# Patient Record
Sex: FEMALE | Race: WHITE | NOT HISPANIC OR LATINO
[De-identification: names, ages, dates, MRNs, and addresses within clinical notes are randomized per-mention and may not be internally consistent; named-entity substitution may affect disease eponyms.]

---

## 2019-10-02 ENCOUNTER — APPOINTMENT (OUTPATIENT)
Dept: CARDIOLOGY | Facility: CLINIC | Age: 69
End: 2019-10-02
Payer: MEDICARE

## 2019-10-02 VITALS
HEIGHT: 67 IN | HEART RATE: 86 BPM | SYSTOLIC BLOOD PRESSURE: 122 MMHG | BODY MASS INDEX: 26.06 KG/M2 | WEIGHT: 166 LBS | DIASTOLIC BLOOD PRESSURE: 70 MMHG

## 2019-10-02 DIAGNOSIS — Z87.891 PERSONAL HISTORY OF NICOTINE DEPENDENCE: ICD-10-CM

## 2019-10-02 PROCEDURE — 99214 OFFICE O/P EST MOD 30 MIN: CPT

## 2019-10-02 PROCEDURE — 93000 ELECTROCARDIOGRAM COMPLETE: CPT

## 2019-10-02 RX ORDER — LANSOPRAZOLE 30 MG/1
30 CAPSULE, DELAYED RELEASE ORAL DAILY
Refills: 0 | Status: ACTIVE | COMMUNITY

## 2019-10-02 RX ORDER — ROSUVASTATIN CALCIUM 5 MG/1
5 TABLET, FILM COATED ORAL DAILY
Refills: 0 | Status: ACTIVE | COMMUNITY

## 2019-10-02 NOTE — PHYSICAL EXAM
[General Appearance - Well Developed] : well developed [Normal Appearance] : normal appearance [Well Groomed] : well groomed [No Deformities] : no deformities [General Appearance - Well Nourished] : well nourished [General Appearance - In No Acute Distress] : no acute distress [Heart Rate And Rhythm] : heart rate and rhythm were normal [Heart Sounds] : normal S1 and S2 [Murmurs] : no murmurs present [Respiration, Rhythm And Depth] : normal respiratory rhythm and effort [Auscultation Breath Sounds / Voice Sounds] : lungs were clear to auscultation bilaterally [Exaggerated Use Of Accessory Muscles For Inspiration] : no accessory muscle use [Abdomen Soft] : soft [Abdomen Tenderness] : non-tender [] : no hepato-splenomegaly [Abdomen Mass (___ Cm)] : no abdominal mass palpated [Oriented To Time, Place, And Person] : oriented to person, place, and time [Affect] : the affect was normal [Mood] : the mood was normal [No Anxiety] : not feeling anxious [FreeTextEntry1] : no pedal edema

## 2019-10-02 NOTE — HISTORY OF PRESENT ILLNESS
[FreeTextEntry1] : Today the patient reports she has been doing well without any new symptoms and she denies chest pain, SOB, dizziness, palpitations, or syncopes\par She is compliant with all her current medication.\par referred her for cardiac evaluatiojn\par ekg shows lahb and rbbb\par denies valvular heart disease\par no -palps, pre-syncope or syncope\par no tia, cva or pvd

## 2019-10-04 ENCOUNTER — OTHER (OUTPATIENT)
Age: 69
End: 2019-10-04

## 2019-11-25 ENCOUNTER — APPOINTMENT (OUTPATIENT)
Dept: CARDIOLOGY | Facility: CLINIC | Age: 69
End: 2019-11-25
Payer: MEDICARE

## 2019-11-25 PROCEDURE — 93306 TTE W/DOPPLER COMPLETE: CPT

## 2019-11-25 PROCEDURE — 93880 EXTRACRANIAL BILAT STUDY: CPT

## 2020-06-18 ENCOUNTER — APPOINTMENT (OUTPATIENT)
Dept: CARDIOLOGY | Facility: CLINIC | Age: 70
End: 2020-06-18

## 2020-08-26 ENCOUNTER — APPOINTMENT (OUTPATIENT)
Dept: SURGERY | Facility: CLINIC | Age: 70
End: 2020-08-26
Payer: MEDICARE

## 2020-08-26 VITALS
DIASTOLIC BLOOD PRESSURE: 81 MMHG | BODY MASS INDEX: 27.78 KG/M2 | TEMPERATURE: 97.2 F | SYSTOLIC BLOOD PRESSURE: 115 MMHG | HEIGHT: 67 IN | WEIGHT: 177 LBS | HEART RATE: 82 BPM

## 2020-08-26 DIAGNOSIS — Z80.0 FAMILY HISTORY OF MALIGNANT NEOPLASM OF DIGESTIVE ORGANS: ICD-10-CM

## 2020-08-26 DIAGNOSIS — Z84.1 FAMILY HISTORY OF DISORDERS OF KIDNEY AND URETER: ICD-10-CM

## 2020-08-26 DIAGNOSIS — C44.722 SQUAMOUS CELL CARCINOMA OF SKIN OF RIGHT LOWER LIMB, INCLUDING HIP: ICD-10-CM

## 2020-08-26 DIAGNOSIS — C44.42 SQUAMOUS CELL CARCINOMA OF SKIN OF SCALP AND NECK: ICD-10-CM

## 2020-08-26 DIAGNOSIS — D21.12 BENIGN NEOPLASM OF CONNECTIVE AND OTHER SOFT TISSUE OF LEFT UPPER LIMB, INCLUDING SHOULDER: ICD-10-CM

## 2020-08-26 DIAGNOSIS — Z82.49 FAMILY HISTORY OF ISCHEMIC HEART DISEASE AND OTHER DISEASES OF THE CIRCULATORY SYSTEM: ICD-10-CM

## 2020-08-26 DIAGNOSIS — Z80.3 FAMILY HISTORY OF MALIGNANT NEOPLASM OF BREAST: ICD-10-CM

## 2020-08-26 PROCEDURE — 99203 OFFICE O/P NEW LOW 30 MIN: CPT

## 2020-08-26 NOTE — ASSESSMENT
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 After obtaining the slides for review and confirmation of the diagnosis, she will have a wide local excision, I believe this can be accomplished with primary closure under local anesthesia with IV sedation. The procedure was discussed in full with its risks and benefits and they understand and agree.\par The patient has been referred already to Dr. France for management of the right frontal scalp lesion, which I believe is appropriate as it may be better managed with a Mohs excision. 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 She understands to contact me should it enlarge or becomes symptomatic.\par \par The patient will return to Dr. Barron for cardiology reevaluation prior to surgery. All their questions were answered and a happy with the assessment and plan

## 2020-08-26 NOTE — REASON FOR VISIT
[Initial Evaluation] : an initial evaluation [Spouse] : spouse [FreeTextEntry1] : skin lesion right lower leg

## 2020-08-26 NOTE — HISTORY OF PRESENT ILLNESS
[de-identified] : The patient is referred by her dermatologist and comes with her  for treatment of a skin lesion of the right lower leg she has known about this since June and when it was biopsied by the dermatologist, this revealed a squamous cell carcinoma. 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 She has been referred to Dr. Kiki France to deal with this lesion. They are asymptomatic and not causing any bleeding or other problems

## 2020-08-26 NOTE — DATA REVIEWED
[FreeTextEntry1] : Dermatopathology report from earlier this month noted. Also reviewed some of her prior cardiology evaluations.

## 2020-08-26 NOTE — PHYSICAL EXAM
[Normal Thyroid] : the thyroid was normal [Normal Breath Sounds] : Normal breath sounds [Normal Heart Sounds] : normal heart sounds [Normal Rate and Rhythm] : normal rate and rhythm [No HSM] : no hepatosplenomegaly [de-identified] : healthy in appearance [Abdominal Masses] : No abdominal masses [de-identified] : No inguinal or femoral lymphadenopathy bilaterally. [de-identified] : 3 small open lesions are noted in the right frontal region, adjacent to a scar from prior SCC. excision. The lesions comprising an area of 1.5 cm with no evidence of satellite disease or palpable mass. No lymphadenopathy noted in the parotid, cervical or supraclavicular regions. [de-identified] : A 3 x 3.5 cm lobulated subcutaneous mass in the left antecubital fossa, this is stable and asymptomatic over the long-term as per the patient. [de-identified] : 5 by 8mm healing shaved biopsy site in the distal part of the right pretibial region. This area is soft and mobile and there is no evidence of satellite disease or disease in transit. She has normal pedal pulses bilaterally.

## 2020-08-27 ENCOUNTER — APPOINTMENT (OUTPATIENT)
Dept: CARDIOLOGY | Facility: CLINIC | Age: 70
End: 2020-08-27
Payer: MEDICARE

## 2020-08-27 VITALS
WEIGHT: 175 LBS | BODY MASS INDEX: 27.41 KG/M2 | DIASTOLIC BLOOD PRESSURE: 80 MMHG | SYSTOLIC BLOOD PRESSURE: 132 MMHG | HEART RATE: 93 BPM | TEMPERATURE: 97.1 F

## 2020-08-27 PROCEDURE — 93000 ELECTROCARDIOGRAM COMPLETE: CPT

## 2020-08-27 PROCEDURE — 99214 OFFICE O/P EST MOD 30 MIN: CPT

## 2020-08-27 NOTE — ASSESSMENT
[FreeTextEntry1] : Today the patient reports she has been doing well without any new symptoms and she denies chest pain, SOB, dizziness, palpitations, or syncopes. She is compliant with all her current medication.\par \par No further testing at this time, no restrictions on her activity and she will continue her present cardiac medications. She will return in four months for follow-up.

## 2020-08-27 NOTE — REASON FOR VISIT
[Coronary Artery Disease] : coronary artery disease [Initial Evaluation] : an initial evaluation [Spouse] : spouse [FreeTextEntry1] : skin lesion right lower leg

## 2020-08-27 NOTE — HISTORY OF PRESENT ILLNESS
[FreeTextEntry1] : Today the patient reports she has been doing well without any new symptoms and she denies chest pain, SOB, dizziness, palpitations, or syncopes. She is compliant with all her current medication. [de-identified] : The patient is referred by her dermatologist and comes with her  for treatment of a skin lesion of the right lower leg she has known about this since June and when it was biopsied by the dermatologist, this revealed a squamous cell carcinoma. 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 She has been referred to Dr. Kiki France to deal with this lesion. They are asymptomatic and not causing any bleeding or other problems

## 2020-08-27 NOTE — PHYSICAL EXAM
[General Appearance - Well Developed] : well developed [Normal Appearance] : normal appearance [Well Groomed] : well groomed [General Appearance - Well Nourished] : well nourished [No Deformities] : no deformities [General Appearance - In No Acute Distress] : no acute distress [Respiration, Rhythm And Depth] : normal respiratory rhythm and effort [Exaggerated Use Of Accessory Muscles For Inspiration] : no accessory muscle use [Auscultation Breath Sounds / Voice Sounds] : lungs were clear to auscultation bilaterally [Heart Rate And Rhythm] : heart rate and rhythm were normal [Heart Sounds] : normal S1 and S2 [Murmurs] : no murmurs present [Abdomen Soft] : soft [Abdomen Tenderness] : non-tender [] : no hepato-splenomegaly [Abdomen Mass (___ Cm)] : no abdominal mass palpated [Oriented To Time, Place, And Person] : oriented to person, place, and time [Affect] : the affect was normal [Mood] : the mood was normal [No Anxiety] : not feeling anxious [Normal Thyroid] : the thyroid was normal [Normal Breath Sounds] : Normal breath sounds [Normal Heart Sounds] : normal heart sounds [Normal Rate and Rhythm] : normal rate and rhythm [No HSM] : no hepatosplenomegaly [FreeTextEntry1] : no bruit [Abdominal Masses] : No abdominal masses [de-identified] : healthy in appearance [de-identified] : 3 small open lesions are noted in the right frontal region, adjacent to a scar from prior SCC. excision. The lesions comprising an area of 1.5 cm with no evidence of satellite disease or palpable mass. No lymphadenopathy noted in the parotid, cervical or supraclavicular regions. [de-identified] : No inguinal or femoral lymphadenopathy bilaterally. [de-identified] : A 3 x 3.5 cm lobulated subcutaneous mass in the left antecubital fossa, this is stable and asymptomatic over the long-term as per the patient. [de-identified] : 5 by 8mm healing shaved biopsy site in the distal part of the right pretibial region. This area is soft and mobile and there is no evidence of satellite disease or disease in transit. She has normal pedal pulses bilaterally.

## 2020-08-31 ENCOUNTER — LABORATORY RESULT (OUTPATIENT)
Age: 70
End: 2020-08-31

## 2020-10-08 ENCOUNTER — APPOINTMENT (OUTPATIENT)
Dept: SURGERY | Facility: CLINIC | Age: 70
End: 2020-10-08

## 2021-01-28 ENCOUNTER — APPOINTMENT (OUTPATIENT)
Dept: CARDIOLOGY | Facility: CLINIC | Age: 71
End: 2021-01-28
Payer: MEDICARE

## 2021-01-28 VITALS
WEIGHT: 186 LBS | HEART RATE: 67 BPM | TEMPERATURE: 97.3 F | BODY MASS INDEX: 29.13 KG/M2 | SYSTOLIC BLOOD PRESSURE: 110 MMHG | DIASTOLIC BLOOD PRESSURE: 60 MMHG

## 2021-01-28 PROCEDURE — 93000 ELECTROCARDIOGRAM COMPLETE: CPT

## 2021-01-28 PROCEDURE — 99214 OFFICE O/P EST MOD 30 MIN: CPT

## 2021-01-28 NOTE — PHYSICAL EXAM
[General Appearance - Well Developed] : well developed [Normal Appearance] : normal appearance [Well Groomed] : well groomed [General Appearance - Well Nourished] : well nourished [No Deformities] : no deformities [General Appearance - In No Acute Distress] : no acute distress [Respiration, Rhythm And Depth] : normal respiratory rhythm and effort [Auscultation Breath Sounds / Voice Sounds] : lungs were clear to auscultation bilaterally [Exaggerated Use Of Accessory Muscles For Inspiration] : no accessory muscle use [Heart Rate And Rhythm] : heart rate and rhythm were normal [Heart Sounds] : normal S1 and S2 [Murmurs] : no murmurs present [Abdomen Soft] : soft [Abdomen Tenderness] : non-tender [] : no hepato-splenomegaly [Abdomen Mass (___ Cm)] : no abdominal mass palpated [FreeTextEntry1] : no pedal edema [Oriented To Time, Place, And Person] : oriented to person, place, and time [Affect] : the affect was normal [Mood] : the mood was normal [No Anxiety] : not feeling anxious [Normal Breath Sounds] : Normal breath sounds [Normal Thyroid] : the thyroid was normal [Normal Heart Sounds] : normal heart sounds [Normal Rate and Rhythm] : normal rate and rhythm [Abdominal Masses] : No abdominal masses [No HSM] : no hepatosplenomegaly [de-identified] : healthy in appearance [de-identified] : 3 small open lesions are noted in the right frontal region, adjacent to a scar from prior SCC. excision. The lesions comprising an area of 1.5 cm with no evidence of satellite disease or palpable mass. No lymphadenopathy noted in the parotid, cervical or supraclavicular regions. [de-identified] : No inguinal or femoral lymphadenopathy bilaterally. [de-identified] : A 3 x 3.5 cm lobulated subcutaneous mass in the left antecubital fossa, this is stable and asymptomatic over the long-term as per the patient. [de-identified] : 5 by 8mm healing shaved biopsy site in the distal part of the right pretibial region. This area is soft and mobile and there is no evidence of satellite disease or disease in transit. She has normal pedal pulses bilaterally.

## 2021-01-28 NOTE — HISTORY OF PRESENT ILLNESS
[FreeTextEntry1] : Today the patient reports she has been doing well without any new symptoms and she denies chest pain, SOB, dizziness, palpitations, or syncopes\par She is compliant with all her current medication.\par referred her for cardiac evaluatiojn\par ekg shows lahb and rbbb\par denies valvular heart disease\par no -palps, pre-syncope or syncope\par no tia, cva or pvd [de-identified] : The patient is referred by her dermatologist and comes with her  for treatment of a skin lesion of the right lower leg she has known about this since June and when it was biopsied by the dermatologist, this revealed a squamous cell carcinoma. 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 She has been referred to Dr. Kiki France to deal with this lesion. They are asymptomatic and not causing any bleeding or other problems

## 2021-04-27 ENCOUNTER — APPOINTMENT (OUTPATIENT)
Dept: CARDIOLOGY | Facility: CLINIC | Age: 71
End: 2021-04-27
Payer: MEDICARE

## 2021-04-27 PROCEDURE — 93306 TTE W/DOPPLER COMPLETE: CPT

## 2021-06-01 ENCOUNTER — APPOINTMENT (OUTPATIENT)
Dept: CARDIOLOGY | Facility: CLINIC | Age: 71
End: 2021-06-01
Payer: MEDICARE

## 2021-06-01 ENCOUNTER — RESULT CHARGE (OUTPATIENT)
Age: 71
End: 2021-06-01

## 2021-06-01 VITALS
BODY MASS INDEX: 27.41 KG/M2 | SYSTOLIC BLOOD PRESSURE: 122 MMHG | WEIGHT: 175 LBS | TEMPERATURE: 97.1 F | DIASTOLIC BLOOD PRESSURE: 70 MMHG | HEART RATE: 77 BPM

## 2021-06-01 PROCEDURE — 93000 ELECTROCARDIOGRAM COMPLETE: CPT

## 2021-06-01 PROCEDURE — 99214 OFFICE O/P EST MOD 30 MIN: CPT

## 2021-06-01 RX ORDER — GLIMEPIRIDE 2 MG/1
2 TABLET ORAL DAILY
Refills: 0 | Status: ACTIVE | COMMUNITY

## 2021-06-01 RX ORDER — BLOOD SUGAR DIAGNOSTIC
STRIP MISCELLANEOUS
Qty: 300 | Refills: 0 | Status: ACTIVE | COMMUNITY
Start: 2021-01-14

## 2021-06-01 RX ORDER — LANCETS 33 GAUGE
EACH MISCELLANEOUS
Qty: 100 | Refills: 0 | Status: ACTIVE | COMMUNITY
Start: 2021-02-28

## 2021-06-01 NOTE — PHYSICAL EXAM
[Well Developed] : well developed [Well Nourished] : well nourished [No Acute Distress] : no acute distress [Normal Conjunctiva] : normal conjunctiva [Normal Venous Pressure] : normal venous pressure [No Carotid Bruit] : no carotid bruit [Normal S1, S2] : normal S1, S2 [No Murmur] : no murmur [No Rub] : no rub [No Gallop] : no gallop [Clear Lung Fields] : clear lung fields [Good Air Entry] : good air entry [No Respiratory Distress] : no respiratory distress  [Soft] : abdomen soft [Non Tender] : non-tender [No Masses/organomegaly] : no masses/organomegaly [Normal Bowel Sounds] : normal bowel sounds [Normal Gait] : normal gait [No Edema] : no edema [No Cyanosis] : no cyanosis [No Clubbing] : no clubbing [No Varicosities] : no varicosities [No Rash] : no rash [No Skin Lesions] : no skin lesions [Moves all extremities] : moves all extremities [No Focal Deficits] : no focal deficits [Normal Speech] : normal speech [Alert and Oriented] : alert and oriented [Normal memory] : normal memory [General Appearance - Well Developed] : well developed [Normal Appearance] : normal appearance [Well Groomed] : well groomed [General Appearance - Well Nourished] : well nourished [No Deformities] : no deformities [General Appearance - In No Acute Distress] : no acute distress [Exaggerated Use Of Accessory Muscles For Inspiration] : no accessory muscle use [Respiration, Rhythm And Depth] : normal respiratory rhythm and effort [Auscultation Breath Sounds / Voice Sounds] : lungs were clear to auscultation bilaterally [Heart Rate And Rhythm] : heart rate and rhythm were normal [Heart Sounds] : normal S1 and S2 [Murmurs] : no murmurs present [Abdomen Soft] : soft [Abdomen Tenderness] : non-tender [] : no hepato-splenomegaly [Abdomen Mass (___ Cm)] : no abdominal mass palpated [FreeTextEntry1] : no pedal edema [Oriented To Time, Place, And Person] : oriented to person, place, and time [Affect] : the affect was normal [Mood] : the mood was normal [No Anxiety] : not feeling anxious [Normal Thyroid] : the thyroid was normal [Normal Breath Sounds] : Normal breath sounds [Normal Heart Sounds] : normal heart sounds [Normal Rate and Rhythm] : normal rate and rhythm [Abdominal Masses] : No abdominal masses [No HSM] : no hepatosplenomegaly [de-identified] : healthy in appearance [de-identified] : 3 small open lesions are noted in the right frontal region, adjacent to a scar from prior SCC. excision. The lesions comprising an area of 1.5 cm with no evidence of satellite disease or palpable mass. No lymphadenopathy noted in the parotid, cervical or supraclavicular regions. [de-identified] : No inguinal or femoral lymphadenopathy bilaterally. [de-identified] : 5 by 8mm healing shaved biopsy site in the distal part of the right pretibial region. This area is soft and mobile and there is no evidence of satellite disease or disease in transit. She has normal pedal pulses bilaterally. [de-identified] : A 3 x 3.5 cm lobulated subcutaneous mass in the left antecubital fossa, this is stable and asymptomatic over the long-term as per the patient.

## 2021-06-01 NOTE — HISTORY OF PRESENT ILLNESS
[FreeTextEntry1] : Today the patient reports she has been doing well without any new symptoms and she denies chest pain, SOB, dizziness, palpitations, or syncopes\par She is compliant with all her current medication.\par referred her for cardiac evaluatiojn\par ekg shows lahb and rbbb\par denies valvular heart disease\par no -palps, pre-syncope or syncope\par no tia, cva or pvd\par \par patient returns today for results of echo\par  ech is normal\par trace mitral regurgitation\par denies cardiac symptoms\par no palps\par no tia, pre-syncope or syncope [de-identified] : The patient is referred by her dermatologist and comes with her  for treatment of a skin lesion of the right lower leg she has known about this since June and when it was biopsied by the dermatologist, this revealed a squamous cell carcinoma. 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 She has been referred to Dr. Kiki France to deal with this lesion. They are asymptomatic and not causing any bleeding or other problems

## 2021-12-02 ENCOUNTER — APPOINTMENT (OUTPATIENT)
Dept: CARDIOLOGY | Facility: CLINIC | Age: 71
End: 2021-12-02
Payer: MEDICARE

## 2021-12-02 VITALS
DIASTOLIC BLOOD PRESSURE: 80 MMHG | BODY MASS INDEX: 28.25 KG/M2 | SYSTOLIC BLOOD PRESSURE: 150 MMHG | HEART RATE: 66 BPM | WEIGHT: 180 LBS | HEIGHT: 67 IN | TEMPERATURE: 97.6 F

## 2021-12-02 PROCEDURE — 99214 OFFICE O/P EST MOD 30 MIN: CPT

## 2021-12-02 PROCEDURE — 93000 ELECTROCARDIOGRAM COMPLETE: CPT

## 2021-12-07 NOTE — PHYSICAL EXAM
[Well Developed] : well developed [Well Nourished] : well nourished [No Acute Distress] : no acute distress [Normal Conjunctiva] : normal conjunctiva [Normal Venous Pressure] : normal venous pressure [No Carotid Bruit] : no carotid bruit [Normal S1, S2] : normal S1, S2 [No Murmur] : no murmur [No Rub] : no rub [No Gallop] : no gallop [Clear Lung Fields] : clear lung fields [Good Air Entry] : good air entry [No Respiratory Distress] : no respiratory distress  [Soft] : abdomen soft [Non Tender] : non-tender [No Masses/organomegaly] : no masses/organomegaly [Normal Bowel Sounds] : normal bowel sounds [Normal Gait] : normal gait [No Edema] : no edema [No Cyanosis] : no cyanosis [No Clubbing] : no clubbing [No Varicosities] : no varicosities [No Rash] : no rash [No Skin Lesions] : no skin lesions [Moves all extremities] : moves all extremities [No Focal Deficits] : no focal deficits [Normal Speech] : normal speech [Alert and Oriented] : alert and oriented [Normal memory] : normal memory [General Appearance - Well Developed] : well developed [Normal Appearance] : normal appearance [Well Groomed] : well groomed [General Appearance - Well Nourished] : well nourished [No Deformities] : no deformities [General Appearance - In No Acute Distress] : no acute distress [Respiration, Rhythm And Depth] : normal respiratory rhythm and effort [Exaggerated Use Of Accessory Muscles For Inspiration] : no accessory muscle use [Auscultation Breath Sounds / Voice Sounds] : lungs were clear to auscultation bilaterally [Heart Rate And Rhythm] : heart rate and rhythm were normal [Heart Sounds] : normal S1 and S2 [Murmurs] : no murmurs present [Abdomen Soft] : soft [Abdomen Tenderness] : non-tender [] : no hepato-splenomegaly [Abdomen Mass (___ Cm)] : no abdominal mass palpated [Oriented To Time, Place, And Person] : oriented to person, place, and time [Affect] : the affect was normal [Mood] : the mood was normal [No Anxiety] : not feeling anxious [Normal Thyroid] : the thyroid was normal [Normal Breath Sounds] : Normal breath sounds [Normal Heart Sounds] : normal heart sounds [Normal Rate and Rhythm] : normal rate and rhythm [No HSM] : no hepatosplenomegaly [FreeTextEntry1] : no pedal edema [Abdominal Masses] : No abdominal masses [de-identified] : healthy in appearance [de-identified] : 3 small open lesions are noted in the right frontal region, adjacent to a scar from prior SCC. excision. The lesions comprising an area of 1.5 cm with no evidence of satellite disease or palpable mass. No lymphadenopathy noted in the parotid, cervical or supraclavicular regions. [de-identified] : No inguinal or femoral lymphadenopathy bilaterally. [de-identified] : A 3 x 3.5 cm lobulated subcutaneous mass in the left antecubital fossa, this is stable and asymptomatic over the long-term as per the patient. [de-identified] : 5 by 8mm healing shaved biopsy site in the distal part of the right pretibial region. This area is soft and mobile and there is no evidence of satellite disease or disease in transit. She has normal pedal pulses bilaterally.

## 2021-12-07 NOTE — HISTORY OF PRESENT ILLNESS
[FreeTextEntry1] : Today the patient reports she has been doing well without any new symptoms and she denies chest pain, SOB, dizziness, palpitations, or syncopes\par She is compliant with all her current medication.\par referred her for cardiac evaluation\par ekg shows lahb and rbbb\par denies valvular heart disease\par no -palps, pre-syncope or syncope\par no tia, cva or pvd\par \par patient returns today for results of echo\par  echo is normal\par trace mitral regurgitation\par denies cardiac symptoms\par no palps\par no tia, pre-syncope or syncope\par \par today's ekg is unchanged\par no new complaints [de-identified] : The patient is referred by her dermatologist and comes with her  for treatment of a skin lesion of the right lower leg she has known about this since June and when it was biopsied by the dermatologist, this revealed a squamous cell carcinoma. 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 She has been referred to Dr. Kiki France to deal with this lesion. They are asymptomatic and not causing any bleeding or other problems

## 2022-05-03 ENCOUNTER — APPOINTMENT (OUTPATIENT)
Dept: CARDIOLOGY | Facility: CLINIC | Age: 72
End: 2022-05-03
Payer: MEDICARE

## 2022-05-03 VITALS
BODY MASS INDEX: 29.45 KG/M2 | HEART RATE: 69 BPM | WEIGHT: 188 LBS | SYSTOLIC BLOOD PRESSURE: 122 MMHG | DIASTOLIC BLOOD PRESSURE: 80 MMHG

## 2022-05-03 PROCEDURE — 99214 OFFICE O/P EST MOD 30 MIN: CPT

## 2022-05-03 PROCEDURE — 93000 ELECTROCARDIOGRAM COMPLETE: CPT

## 2022-05-03 RX ORDER — AMLODIPINE BESYLATE 2.5 MG/1
2.5 TABLET ORAL DAILY
Qty: 30 | Refills: 2 | Status: DISCONTINUED | COMMUNITY
End: 2022-05-03

## 2022-05-03 RX ORDER — SULFAMETHOXAZOLE AND TRIMETHOPRIM 800; 160 MG/1; MG/1
800-160 TABLET ORAL
Qty: 6 | Refills: 0 | Status: DISCONTINUED | COMMUNITY
Start: 2021-01-18 | End: 2022-05-03

## 2022-05-03 RX ORDER — HYDROCHLOROTHIAZIDE 25 MG/1
25 TABLET ORAL DAILY
Qty: 90 | Refills: 3 | Status: DISCONTINUED | COMMUNITY
End: 2022-05-03

## 2022-05-03 NOTE — ASSESSMENT
[FreeTextEntry1] : .rbbb, lahb\par conduction disorder\par near syncopal episode\par atypical chest pain\par h/o palps\par

## 2022-05-03 NOTE — PHYSICAL EXAM
[Well Developed] : well developed [Well Nourished] : well nourished [No Acute Distress] : no acute distress [Normal Conjunctiva] : normal conjunctiva [Normal Venous Pressure] : normal venous pressure [No Carotid Bruit] : no carotid bruit [Normal S1, S2] : normal S1, S2 [No Murmur] : no murmur [No Rub] : no rub [No Gallop] : no gallop [Clear Lung Fields] : clear lung fields [Good Air Entry] : good air entry [No Respiratory Distress] : no respiratory distress  [Soft] : abdomen soft [Non Tender] : non-tender [No Masses/organomegaly] : no masses/organomegaly [Normal Bowel Sounds] : normal bowel sounds [Normal Gait] : normal gait [No Edema] : no edema [No Cyanosis] : no cyanosis [No Clubbing] : no clubbing [No Varicosities] : no varicosities [No Rash] : no rash [No Skin Lesions] : no skin lesions [Moves all extremities] : moves all extremities [No Focal Deficits] : no focal deficits [Normal Speech] : normal speech [Alert and Oriented] : alert and oriented [Normal memory] : normal memory [Normal Appearance] : normal appearance [General Appearance - Well Developed] : well developed [Well Groomed] : well groomed [General Appearance - Well Nourished] : well nourished [No Deformities] : no deformities [General Appearance - In No Acute Distress] : no acute distress [Respiration, Rhythm And Depth] : normal respiratory rhythm and effort [Exaggerated Use Of Accessory Muscles For Inspiration] : no accessory muscle use [Auscultation Breath Sounds / Voice Sounds] : lungs were clear to auscultation bilaterally [Heart Rate And Rhythm] : heart rate and rhythm were normal [Heart Sounds] : normal S1 and S2 [Murmurs] : no murmurs present [Abdomen Soft] : soft [Abdomen Tenderness] : non-tender [Abdomen Mass (___ Cm)] : no abdominal mass palpated [] : no hepato-splenomegaly [Oriented To Time, Place, And Person] : oriented to person, place, and time [Affect] : the affect was normal [Mood] : the mood was normal [No Anxiety] : not feeling anxious [Normal Thyroid] : the thyroid was normal [Normal Breath Sounds] : Normal breath sounds [Normal Heart Sounds] : normal heart sounds [Normal Rate and Rhythm] : normal rate and rhythm [No HSM] : no hepatosplenomegaly [FreeTextEntry1] : no pedal edema [Abdominal Masses] : No abdominal masses [de-identified] : healthy in appearance [de-identified] : 3 small open lesions are noted in the right frontal region, adjacent to a scar from prior SCC. excision. The lesions comprising an area of 1.5 cm with no evidence of satellite disease or palpable mass. No lymphadenopathy noted in the parotid, cervical or supraclavicular regions. [de-identified] : No inguinal or femoral lymphadenopathy bilaterally. [de-identified] : A 3 x 3.5 cm lobulated subcutaneous mass in the left antecubital fossa, this is stable and asymptomatic over the long-term as per the patient. [de-identified] : 5 by 8mm healing shaved biopsy site in the distal part of the right pretibial region. This area is soft and mobile and there is no evidence of satellite disease or disease in transit. She has normal pedal pulses bilaterally.

## 2022-05-03 NOTE — HISTORY OF PRESENT ILLNESS
[FreeTextEntry1] : Today the patient reports she has been doing well without any new symptoms\par  she denies chest pain, SOB, dizziness, palpitations, or syncopes\par She is compliant with all her current medication.\par referred her for cardiac evaluation\par ekg shows lahb and rbbb\par denies valvular heart disease\par no -palps, pre-syncope or syncope\par no tia, cva or pvd\par \par patient returns today for results of echo\par  echo is normal\par trace mitral regurgitation\par denies cardiac symptoms\par no palps\par no tia, pre-syncope or syncope\par \par today's ekg is unchanged\par no new complaints [de-identified] : The patient is referred by her dermatologist and comes with her  for treatment of a skin lesion of the right lower leg she has known about this since June and when it was biopsied by the dermatologist, this revealed a squamous cell carcinoma. 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 She has been referred to Dr. Kiki France to deal with this lesion. They are asymptomatic and not causing any bleeding or other problems

## 2022-09-07 ENCOUNTER — APPOINTMENT (OUTPATIENT)
Dept: PAIN MANAGEMENT | Facility: CLINIC | Age: 72
End: 2022-09-07

## 2022-11-10 ENCOUNTER — RESULT CHARGE (OUTPATIENT)
Age: 72
End: 2022-11-10

## 2022-11-10 ENCOUNTER — APPOINTMENT (OUTPATIENT)
Dept: CARDIOLOGY | Facility: CLINIC | Age: 72
End: 2022-11-10

## 2022-11-10 VITALS
HEART RATE: 76 BPM | HEIGHT: 67 IN | SYSTOLIC BLOOD PRESSURE: 132 MMHG | WEIGHT: 178 LBS | DIASTOLIC BLOOD PRESSURE: 62 MMHG | BODY MASS INDEX: 27.94 KG/M2

## 2022-11-10 DIAGNOSIS — R94.31 ABNORMAL ELECTROCARDIOGRAM [ECG] [EKG]: ICD-10-CM

## 2022-11-10 DIAGNOSIS — I45.10 UNSPECIFIED RIGHT BUNDLE-BRANCH BLOCK: ICD-10-CM

## 2022-11-10 PROCEDURE — 99214 OFFICE O/P EST MOD 30 MIN: CPT | Mod: 25

## 2022-11-10 PROCEDURE — 93000 ELECTROCARDIOGRAM COMPLETE: CPT

## 2022-11-10 NOTE — HISTORY OF PRESENT ILLNESS
[FreeTextEntry1] : Today the patient reports she has been doing well without any new symptoms\par  she denies chest pain, SOB, dizziness, palpitations, or syncopes\par She is compliant with all her current medication.\par referred her for cardiac evaluation\par ekg shows lahb and rbbb\par denies valvular heart disease\par no -palps, pre-syncope or syncope\par no tia, cva or pvd\par \par patient returns today for results of echo\par  echo is normal\par trace mitral regurgitation\par denies cardiac symptoms\par no palps\par no tia, pre-syncope or syncope\par \par today's ekg is unchanged\par no new complaints [de-identified] : The patient is referred by her dermatologist and comes with her  for treatment of a skin lesion of the right lower leg she has known about this since June and when it was biopsied by the dermatologist, this revealed a squamous cell carcinoma. 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 She has been referred to Dr. Kiki France to deal with this lesion. They are asymptomatic and not causing any bleeding or other problems

## 2022-11-10 NOTE — PHYSICAL EXAM
[Well Developed] : well developed [Well Nourished] : well nourished [No Acute Distress] : no acute distress [Normal Conjunctiva] : normal conjunctiva [Normal Venous Pressure] : normal venous pressure [No Carotid Bruit] : no carotid bruit [Normal S1, S2] : normal S1, S2 [No Murmur] : no murmur [No Rub] : no rub [No Gallop] : no gallop [Clear Lung Fields] : clear lung fields [Good Air Entry] : good air entry [No Respiratory Distress] : no respiratory distress  [Soft] : abdomen soft [Non Tender] : non-tender [No Masses/organomegaly] : no masses/organomegaly [Normal Bowel Sounds] : normal bowel sounds [Normal Gait] : normal gait [No Edema] : no edema [No Cyanosis] : no cyanosis [No Clubbing] : no clubbing [No Varicosities] : no varicosities [No Rash] : no rash [No Skin Lesions] : no skin lesions [Moves all extremities] : moves all extremities [No Focal Deficits] : no focal deficits [Normal Speech] : normal speech [Alert and Oriented] : alert and oriented [Normal memory] : normal memory [General Appearance - Well Developed] : well developed [Normal Appearance] : normal appearance [Well Groomed] : well groomed [General Appearance - Well Nourished] : well nourished [No Deformities] : no deformities [General Appearance - In No Acute Distress] : no acute distress [Respiration, Rhythm And Depth] : normal respiratory rhythm and effort [Exaggerated Use Of Accessory Muscles For Inspiration] : no accessory muscle use [Auscultation Breath Sounds / Voice Sounds] : lungs were clear to auscultation bilaterally [Heart Sounds] : normal S1 and S2 [Heart Rate And Rhythm] : heart rate and rhythm were normal [Murmurs] : no murmurs present [Abdomen Soft] : soft [Abdomen Tenderness] : non-tender [] : no hepato-splenomegaly [Abdomen Mass (___ Cm)] : no abdominal mass palpated [FreeTextEntry1] : no pedal edema [Oriented To Time, Place, And Person] : oriented to person, place, and time [Affect] : the affect was normal [Mood] : the mood was normal [No Anxiety] : not feeling anxious [Normal Thyroid] : the thyroid was normal [Normal Breath Sounds] : Normal breath sounds [Normal Heart Sounds] : normal heart sounds [Normal Rate and Rhythm] : normal rate and rhythm [Abdominal Masses] : No abdominal masses [No HSM] : no hepatosplenomegaly [de-identified] : healthy in appearance [de-identified] : 3 small open lesions are noted in the right frontal region, adjacent to a scar from prior SCC. excision. The lesions comprising an area of 1.5 cm with no evidence of satellite disease or palpable mass. No lymphadenopathy noted in the parotid, cervical or supraclavicular regions. [de-identified] : No inguinal or femoral lymphadenopathy bilaterally. [de-identified] : A 3 x 3.5 cm lobulated subcutaneous mass in the left antecubital fossa, this is stable and asymptomatic over the long-term as per the patient. [de-identified] : 5 by 8mm healing shaved biopsy site in the distal part of the right pretibial region. This area is soft and mobile and there is no evidence of satellite disease or disease in transit. She has normal pedal pulses bilaterally.

## 2022-11-10 NOTE — ASSESSMENT
[FreeTextEntry1] : .rbbb, lahb\par conduction disorder\par near syncopal episode\par atypical chest pain\par h/o palps\par 
see chief complaint quote

## 2023-05-18 ENCOUNTER — APPOINTMENT (OUTPATIENT)
Dept: CARDIOLOGY | Facility: CLINIC | Age: 73
End: 2023-05-18

## 2023-10-05 NOTE — DATA REVIEWED
JUDY ambulatory encounter  ORTHOPEDIC POSTOP VISIT      DATE OF SURGERY: 9/9/23  SURGERY PERFORMED: Bilateral quad tendon repair    SUBJECTIVE:    Rudi Junior is a 53 year old male who is here now nearly 1 month postop.  He report moderate pain in both knees at this point.  He continues to take oxycodone for pain.  He is requesting 1 last refill of this.  He states that the incisions are healed.  He continues to remain in his brace is locked in extension.  He is working with physical therapy regularly, who are visiting his home.  He is doing his home exercises diligently.    PAST HISTORIES:    I have reviewed the past medical history, family history, social history, medications and allergies listed in the medical record as obtained by my nursing staff and support staff and agree with their documentation.    OBJECTIVE:    Physical Examination:    Vitals: There were no vitals taken for this visit.   Constitutional:  Well-developed, well-nourished, in no acute distress.  Skin: Warm, dry, intact without rash or lesion.  Neurologic:  Alert and oriented x3.   Musculoskeletal:  Bilateral lower extremity  Both knee wounds are healed.  Able to perform straight leg raises with both legs.  Good sensation throughout bilateral lower extremity  All compartments are soft and non-tender.     Assessment:    Good progress s/p bilateral quad tendon repairs    PLAN:    Rudi is doing well.  He will remain in his hinged knee braces, locked in extension.  He will continue with PT per protocol.  He will also continue with supine therapy assisted flexion advancing flexion by 10° per week. He  will follow up in another 4 weeks. Rudi will contact this clinic sooner should any issues arise.       Tao Dockery PA-C     [FreeTextEntry1] : .

## 2025-06-27 ENCOUNTER — INPATIENT (INPATIENT)
Facility: HOSPITAL | Age: 75
LOS: 13 days | Discharge: HOME | DRG: 872 | End: 2025-07-11
Attending: STUDENT IN AN ORGANIZED HEALTH CARE EDUCATION/TRAINING PROGRAM | Admitting: INTERNAL MEDICINE
Payer: MEDICARE

## 2025-06-27 VITALS — TEMPERATURE: 96 F | WEIGHT: 130.07 LBS | HEIGHT: 68 IN | RESPIRATION RATE: 62 BRPM | OXYGEN SATURATION: 99 %

## 2025-06-27 DIAGNOSIS — A41.9 SEPSIS, UNSPECIFIED ORGANISM: ICD-10-CM

## 2025-06-27 DIAGNOSIS — R09.89 OTHER SPECIFIED SYMPTOMS AND SIGNS INVOLVING THE CIRCULATORY AND RESPIRATORY SYSTEMS: ICD-10-CM

## 2025-06-27 LAB
ALBUMIN SERPL ELPH-MCNC: 2.5 G/DL — LOW (ref 3.5–5.2)
ALP SERPL-CCNC: 135 U/L — HIGH (ref 30–115)
ALT FLD-CCNC: 29 U/L — SIGNIFICANT CHANGE UP (ref 0–41)
AMMONIA BLD-MCNC: 19 UMOL/L — SIGNIFICANT CHANGE UP (ref 11–55)
ANION GAP SERPL CALC-SCNC: 20 MMOL/L — HIGH (ref 7–14)
APPEARANCE UR: ABNORMAL
APTT BLD: 34.7 SEC — SIGNIFICANT CHANGE UP (ref 27–39.2)
AST SERPL-CCNC: 65 U/L — HIGH (ref 0–41)
BASOPHILS # BLD AUTO: 0 K/UL — SIGNIFICANT CHANGE UP (ref 0–0.2)
BASOPHILS NFR BLD AUTO: 0 % — SIGNIFICANT CHANGE UP (ref 0–2)
BILIRUB SERPL-MCNC: 0.6 MG/DL — SIGNIFICANT CHANGE UP (ref 0.2–1.2)
BILIRUB UR-MCNC: ABNORMAL
BUN SERPL-MCNC: 32 MG/DL — HIGH (ref 10–20)
CALCIUM SERPL-MCNC: 10.1 MG/DL — SIGNIFICANT CHANGE UP (ref 8.4–10.5)
CHLORIDE SERPL-SCNC: 95 MMOL/L — LOW (ref 98–110)
CO2 SERPL-SCNC: 20 MMOL/L — SIGNIFICANT CHANGE UP (ref 17–32)
COLOR SPEC: SIGNIFICANT CHANGE UP
CREAT SERPL-MCNC: 0.9 MG/DL — SIGNIFICANT CHANGE UP (ref 0.7–1.5)
DIFF PNL FLD: ABNORMAL
EGFR: 68 ML/MIN/1.73M2 — SIGNIFICANT CHANGE UP
EGFR: 68 ML/MIN/1.73M2 — SIGNIFICANT CHANGE UP
EOSINOPHIL # BLD AUTO: 0.01 K/UL — SIGNIFICANT CHANGE UP (ref 0–0.5)
EOSINOPHIL NFR BLD AUTO: 0.1 % — SIGNIFICANT CHANGE UP (ref 0–6)
FLUAV AG NPH QL: SIGNIFICANT CHANGE UP
FLUBV AG NPH QL: SIGNIFICANT CHANGE UP
GAS PNL BLDV: SIGNIFICANT CHANGE UP
GAS PNL BLDV: SIGNIFICANT CHANGE UP
GLUCOSE BLDC GLUCOMTR-MCNC: 141 MG/DL — HIGH (ref 70–99)
GLUCOSE BLDC GLUCOMTR-MCNC: 255 MG/DL — HIGH (ref 70–99)
GLUCOSE SERPL-MCNC: 164 MG/DL — HIGH (ref 70–99)
GLUCOSE UR QL: NEGATIVE MG/DL — SIGNIFICANT CHANGE UP
HCT VFR BLD CALC: 39.1 % — SIGNIFICANT CHANGE UP (ref 34.5–45)
HGB BLD-MCNC: 12.5 G/DL — SIGNIFICANT CHANGE UP (ref 11.5–15.5)
IMM GRANULOCYTES # BLD AUTO: 0.21 K/UL — HIGH (ref 0–0.07)
IMM GRANULOCYTES NFR BLD AUTO: 2 % — HIGH (ref 0–0.9)
INR BLD: 1.42 RATIO — HIGH (ref 0.65–1.3)
KETONES UR QL: ABNORMAL MG/DL
LEUKOCYTE ESTERASE UR-ACNC: ABNORMAL
LYMPHOCYTES # BLD AUTO: 0.96 K/UL — LOW (ref 1–3.3)
LYMPHOCYTES NFR BLD AUTO: 8.9 % — LOW (ref 13–44)
MCHC RBC-ENTMCNC: 30.5 PG — SIGNIFICANT CHANGE UP (ref 27–34)
MCHC RBC-ENTMCNC: 32 G/DL — SIGNIFICANT CHANGE UP (ref 32–36)
MCV RBC AUTO: 95.4 FL — SIGNIFICANT CHANGE UP (ref 80–100)
MONOCYTES # BLD AUTO: 0.32 K/UL — SIGNIFICANT CHANGE UP (ref 0–0.9)
MONOCYTES NFR BLD AUTO: 3 % — SIGNIFICANT CHANGE UP (ref 2–14)
MRSA PCR RESULT.: NEGATIVE — SIGNIFICANT CHANGE UP
NEUTROPHILS # BLD AUTO: 9.24 K/UL — HIGH (ref 1.8–7.4)
NEUTROPHILS NFR BLD AUTO: 86 % — HIGH (ref 43–77)
NITRITE UR-MCNC: NEGATIVE — SIGNIFICANT CHANGE UP
NRBC # BLD AUTO: 0.02 K/UL — HIGH (ref 0–0)
NRBC # FLD: 0.02 K/UL — HIGH (ref 0–0)
NRBC BLD AUTO-RTO: 0 /100 WBCS — SIGNIFICANT CHANGE UP (ref 0–0)
PH UR: 5.5 — SIGNIFICANT CHANGE UP (ref 5–8)
PLATELET # BLD AUTO: 56 K/UL — LOW (ref 150–400)
POTASSIUM SERPL-MCNC: 4.4 MMOL/L — SIGNIFICANT CHANGE UP (ref 3.5–5)
POTASSIUM SERPL-SCNC: 4.4 MMOL/L — SIGNIFICANT CHANGE UP (ref 3.5–5)
PROCALCITONIN SERPL-MCNC: 0.97 NG/ML — HIGH (ref 0.02–0.1)
PROT SERPL-MCNC: 5.3 G/DL — LOW (ref 6–8)
PROT UR-MCNC: 100 MG/DL
PROTHROM AB SERPL-ACNC: 16.9 SEC — HIGH (ref 9.95–12.87)
RBC # BLD: 4.1 M/UL — SIGNIFICANT CHANGE UP (ref 3.8–5.2)
RBC # FLD: 15.1 % — HIGH (ref 10.3–14.5)
RSV RNA NPH QL NAA+NON-PROBE: SIGNIFICANT CHANGE UP
SARS-COV-2 RNA SPEC QL NAA+PROBE: SIGNIFICANT CHANGE UP
SODIUM SERPL-SCNC: 135 MMOL/L — SIGNIFICANT CHANGE UP (ref 135–146)
SOURCE RESPIRATORY: SIGNIFICANT CHANGE UP
SP GR SPEC: 1.02 — SIGNIFICANT CHANGE UP (ref 1–1.03)
UROBILINOGEN FLD QL: 1 MG/DL — SIGNIFICANT CHANGE UP (ref 0.2–1)
WBC # BLD: 10.76 K/UL — HIGH (ref 3.8–10.5)
WBC # FLD AUTO: 10.76 K/UL — HIGH (ref 3.8–10.5)

## 2025-06-27 PROCEDURE — 93306 TTE W/DOPPLER COMPLETE: CPT

## 2025-06-27 PROCEDURE — 85384 FIBRINOGEN ACTIVITY: CPT

## 2025-06-27 PROCEDURE — 80053 COMPREHEN METABOLIC PANEL: CPT

## 2025-06-27 PROCEDURE — 72170 X-RAY EXAM OF PELVIS: CPT | Mod: 26

## 2025-06-27 PROCEDURE — 84145 PROCALCITONIN (PCT): CPT

## 2025-06-27 PROCEDURE — 71045 X-RAY EXAM CHEST 1 VIEW: CPT

## 2025-06-27 PROCEDURE — 87641 MR-STAPH DNA AMP PROBE: CPT

## 2025-06-27 PROCEDURE — 71260 CT THORAX DX C+: CPT | Mod: 26

## 2025-06-27 PROCEDURE — 71045 X-RAY EXAM CHEST 1 VIEW: CPT | Mod: 26

## 2025-06-27 PROCEDURE — 83605 ASSAY OF LACTIC ACID: CPT

## 2025-06-27 PROCEDURE — P9040: CPT

## 2025-06-27 PROCEDURE — 80061 LIPID PANEL: CPT

## 2025-06-27 PROCEDURE — 82010 KETONE BODYS QUAN: CPT

## 2025-06-27 PROCEDURE — P9035: CPT

## 2025-06-27 PROCEDURE — 84132 ASSAY OF SERUM POTASSIUM: CPT

## 2025-06-27 PROCEDURE — 82962 GLUCOSE BLOOD TEST: CPT

## 2025-06-27 PROCEDURE — 85025 COMPLETE CBC W/AUTO DIFF WBC: CPT

## 2025-06-27 PROCEDURE — 93010 ELECTROCARDIOGRAM REPORT: CPT | Mod: 77

## 2025-06-27 PROCEDURE — 85610 PROTHROMBIN TIME: CPT

## 2025-06-27 PROCEDURE — 85379 FIBRIN DEGRADATION QUANT: CPT

## 2025-06-27 PROCEDURE — 87077 CULTURE AEROBIC IDENTIFY: CPT

## 2025-06-27 PROCEDURE — 93970 EXTREMITY STUDY: CPT | Mod: 26

## 2025-06-27 PROCEDURE — 99223 1ST HOSP IP/OBS HIGH 75: CPT

## 2025-06-27 PROCEDURE — 36430 TRANSFUSION BLD/BLD COMPNT: CPT

## 2025-06-27 PROCEDURE — P9100: CPT

## 2025-06-27 PROCEDURE — 84443 ASSAY THYROID STIM HORMONE: CPT

## 2025-06-27 PROCEDURE — 87640 STAPH A DNA AMP PROBE: CPT

## 2025-06-27 PROCEDURE — 83735 ASSAY OF MAGNESIUM: CPT

## 2025-06-27 PROCEDURE — 92526 ORAL FUNCTION THERAPY: CPT | Mod: GN

## 2025-06-27 PROCEDURE — 86923 COMPATIBILITY TEST ELECTRIC: CPT

## 2025-06-27 PROCEDURE — 80048 BASIC METABOLIC PNL TOTAL CA: CPT

## 2025-06-27 PROCEDURE — 74018 RADEX ABDOMEN 1 VIEW: CPT

## 2025-06-27 PROCEDURE — 85730 THROMBOPLASTIN TIME PARTIAL: CPT

## 2025-06-27 PROCEDURE — 86900 BLOOD TYPING SEROLOGIC ABO: CPT

## 2025-06-27 PROCEDURE — 84100 ASSAY OF PHOSPHORUS: CPT

## 2025-06-27 PROCEDURE — 85027 COMPLETE CBC AUTOMATED: CPT

## 2025-06-27 PROCEDURE — 74177 CT ABD & PELVIS W/CONTRAST: CPT | Mod: 26

## 2025-06-27 PROCEDURE — 72125 CT NECK SPINE W/O DYE: CPT | Mod: 26

## 2025-06-27 PROCEDURE — 87040 BLOOD CULTURE FOR BACTERIA: CPT

## 2025-06-27 PROCEDURE — 70450 CT HEAD/BRAIN W/O DYE: CPT | Mod: 26

## 2025-06-27 PROCEDURE — 93005 ELECTROCARDIOGRAM TRACING: CPT

## 2025-06-27 PROCEDURE — 99285 EMERGENCY DEPT VISIT HI MDM: CPT

## 2025-06-27 PROCEDURE — 92610 EVALUATE SWALLOWING FUNCTION: CPT | Mod: GN

## 2025-06-27 PROCEDURE — 83036 HEMOGLOBIN GLYCOSYLATED A1C: CPT

## 2025-06-27 PROCEDURE — 0241U: CPT

## 2025-06-27 PROCEDURE — 87186 SC STD MICRODIL/AGAR DIL: CPT

## 2025-06-27 PROCEDURE — 93970 EXTREMITY STUDY: CPT

## 2025-06-27 PROCEDURE — 86022 PLATELET ANTIBODIES: CPT

## 2025-06-27 PROCEDURE — 86901 BLOOD TYPING SEROLOGIC RH(D): CPT

## 2025-06-27 PROCEDURE — 36415 COLL VENOUS BLD VENIPUNCTURE: CPT

## 2025-06-27 PROCEDURE — 86850 RBC ANTIBODY SCREEN: CPT

## 2025-06-27 RX ORDER — TIZANIDINE 4 MG/1
2 TABLET ORAL
Refills: 0 | DISCHARGE

## 2025-06-27 RX ORDER — LIPASE/PROTEASE/AMYLASE 10K-37.5K
1 CAPSULE,DELAYED RELEASE (ENTERIC COATED) ORAL
Refills: 0 | Status: DISCONTINUED | OUTPATIENT
Start: 2025-06-27 | End: 2025-06-27

## 2025-06-27 RX ORDER — LIPASE/PROTEASE/AMYLASE 10K-37.5K
2 CAPSULE,DELAYED RELEASE (ENTERIC COATED) ORAL
Refills: 0 | Status: DISCONTINUED | OUTPATIENT
Start: 2025-06-27 | End: 2025-07-01

## 2025-06-27 RX ORDER — CEFEPIME 2 G/20ML
2000 INJECTION, POWDER, FOR SOLUTION INTRAVENOUS EVERY 12 HOURS
Refills: 0 | Status: DISCONTINUED | OUTPATIENT
Start: 2025-06-27 | End: 2025-06-28

## 2025-06-27 RX ORDER — METOPROLOL SUCCINATE 50 MG/1
5 TABLET, EXTENDED RELEASE ORAL EVERY 6 HOURS
Refills: 0 | Status: DISCONTINUED | OUTPATIENT
Start: 2025-06-27 | End: 2025-07-07

## 2025-06-27 RX ORDER — DEXTROSE 50 % IN WATER 50 %
25 SYRINGE (ML) INTRAVENOUS ONCE
Refills: 0 | Status: DISCONTINUED | OUTPATIENT
Start: 2025-06-27 | End: 2025-07-06

## 2025-06-27 RX ORDER — SERTRALINE 100 MG/1
50 TABLET, FILM COATED ORAL DAILY
Refills: 0 | Status: DISCONTINUED | OUTPATIENT
Start: 2025-06-27 | End: 2025-07-11

## 2025-06-27 RX ORDER — HYDROMORPHONE/SOD CHLOR,ISO/PF 2 MG/10 ML
4 SYRINGE (ML) INJECTION EVERY 6 HOURS
Refills: 0 | Status: DISCONTINUED | OUTPATIENT
Start: 2025-06-27 | End: 2025-06-27

## 2025-06-27 RX ORDER — VANCOMYCIN HCL IN 5 % DEXTROSE 1.5G/250ML
1000 PLASTIC BAG, INJECTION (ML) INTRAVENOUS EVERY 12 HOURS
Refills: 0 | Status: DISCONTINUED | OUTPATIENT
Start: 2025-06-27 | End: 2025-06-28

## 2025-06-27 RX ORDER — DEXTROSE 50 % IN WATER 50 %
12.5 SYRINGE (ML) INTRAVENOUS ONCE
Refills: 0 | Status: DISCONTINUED | OUTPATIENT
Start: 2025-06-27 | End: 2025-07-06

## 2025-06-27 RX ORDER — GLUCAGON 3 MG/1
1 POWDER NASAL ONCE
Refills: 0 | Status: DISCONTINUED | OUTPATIENT
Start: 2025-06-27 | End: 2025-07-06

## 2025-06-27 RX ORDER — CEFEPIME 2 G/20ML
2000 INJECTION, POWDER, FOR SOLUTION INTRAVENOUS ONCE
Refills: 0 | Status: COMPLETED | OUTPATIENT
Start: 2025-06-27 | End: 2025-06-27

## 2025-06-27 RX ORDER — DOCUSATE SODIUM 100 MG
3 CAPSULE ORAL
Refills: 0 | DISCHARGE

## 2025-06-27 RX ORDER — SODIUM CHLORIDE 9 G/1000ML
1000 INJECTION, SOLUTION INTRAVENOUS
Refills: 0 | Status: DISCONTINUED | OUTPATIENT
Start: 2025-06-27 | End: 2025-07-01

## 2025-06-27 RX ORDER — APIXABAN 5 MG/1
5 TABLET, FILM COATED ORAL EVERY 12 HOURS
Refills: 0 | Status: DISCONTINUED | OUTPATIENT
Start: 2025-06-27 | End: 2025-06-28

## 2025-06-27 RX ORDER — ACETAMINOPHEN 500 MG/5ML
650 LIQUID (ML) ORAL EVERY 6 HOURS
Refills: 0 | Status: DISCONTINUED | OUTPATIENT
Start: 2025-06-27 | End: 2025-07-11

## 2025-06-27 RX ORDER — INSULIN LISPRO 100 U/ML
INJECTION, SOLUTION INTRAVENOUS; SUBCUTANEOUS
Refills: 0 | Status: DISCONTINUED | OUTPATIENT
Start: 2025-06-27 | End: 2025-07-06

## 2025-06-27 RX ORDER — HYDROMORPHONE/SOD CHLOR,ISO/PF 2 MG/10 ML
4 SYRINGE (ML) INJECTION EVERY 6 HOURS
Refills: 0 | Status: DISCONTINUED | OUTPATIENT
Start: 2025-06-27 | End: 2025-06-28

## 2025-06-27 RX ORDER — SODIUM CHLORIDE 9 G/1000ML
1000 INJECTION, SOLUTION INTRAVENOUS ONCE
Refills: 0 | Status: COMPLETED | OUTPATIENT
Start: 2025-06-27 | End: 2025-06-27

## 2025-06-27 RX ORDER — TIZANIDINE 4 MG/1
4 TABLET ORAL AT BEDTIME
Refills: 0 | Status: DISCONTINUED | OUTPATIENT
Start: 2025-06-27 | End: 2025-07-11

## 2025-06-27 RX ORDER — DILTIAZEM HYDROCHLORIDE 240 MG/1
15 TABLET, EXTENDED RELEASE ORAL ONCE
Refills: 0 | Status: COMPLETED | OUTPATIENT
Start: 2025-06-27 | End: 2025-06-27

## 2025-06-27 RX ORDER — DEXTROSE 50 % IN WATER 50 %
15 SYRINGE (ML) INTRAVENOUS ONCE
Refills: 0 | Status: DISCONTINUED | OUTPATIENT
Start: 2025-06-27 | End: 2025-07-06

## 2025-06-27 RX ORDER — SODIUM CHLORIDE 9 G/1000ML
2000 INJECTION, SOLUTION INTRAVENOUS ONCE
Refills: 0 | Status: COMPLETED | OUTPATIENT
Start: 2025-06-27 | End: 2025-06-27

## 2025-06-27 RX ORDER — SERTRALINE 100 MG/1
1 TABLET, FILM COATED ORAL
Refills: 0 | DISCHARGE

## 2025-06-27 RX ORDER — SODIUM CHLORIDE 9 G/1000ML
1000 INJECTION, SOLUTION INTRAVENOUS
Refills: 0 | Status: DISCONTINUED | OUTPATIENT
Start: 2025-06-27 | End: 2025-07-06

## 2025-06-27 RX ADMIN — APIXABAN 5 MILLIGRAM(S): 5 TABLET, FILM COATED ORAL at 20:01

## 2025-06-27 RX ADMIN — DILTIAZEM HYDROCHLORIDE 15 MILLIGRAM(S): 240 TABLET, EXTENDED RELEASE ORAL at 20:13

## 2025-06-27 RX ADMIN — CEFEPIME 100 MILLIGRAM(S): 2 INJECTION, POWDER, FOR SOLUTION INTRAVENOUS at 15:14

## 2025-06-27 RX ADMIN — SODIUM CHLORIDE 75 MILLILITER(S): 9 INJECTION, SOLUTION INTRAVENOUS at 20:47

## 2025-06-27 RX ADMIN — SODIUM CHLORIDE 2000 MILLILITER(S): 9 INJECTION, SOLUTION INTRAVENOUS at 10:20

## 2025-06-27 RX ADMIN — Medication 30 MILLIGRAM(S): at 20:31

## 2025-06-27 RX ADMIN — Medication 30 MILLIGRAM(S): at 20:01

## 2025-06-27 RX ADMIN — CEFEPIME 100 MILLIGRAM(S): 2 INJECTION, POWDER, FOR SOLUTION INTRAVENOUS at 19:57

## 2025-06-27 RX ADMIN — Medication 250 MILLIGRAM(S): at 20:47

## 2025-06-27 RX ADMIN — SODIUM CHLORIDE 2000 MILLILITER(S): 9 INJECTION, SOLUTION INTRAVENOUS at 11:50

## 2025-06-27 RX ADMIN — SODIUM CHLORIDE 1000 MILLILITER(S): 9 INJECTION, SOLUTION INTRAVENOUS at 17:00

## 2025-06-27 NOTE — ED ADULT TRIAGE NOTE - CHIEF COMPLAINT QUOTE
BIBA from home, patient has history of pancreatic cancer as per  patient has had altered mental status x 3 days

## 2025-06-27 NOTE — H&P ADULT - ASSESSMENT
80-year-old female with past medical history of hypertension hyperlipidemia DVT on Eliquis recently being treated for pancreatic cancer on chemotherapy with chest port brought to ed for evaluation of the weakness    #Sepsis due to pneumonia.   #AMS (altered mental status).  #Deconditioning due to pancreatic cancer and chemotherapy   #Fall   - blood cultures  - urine cultures  - rvp, mrsa, procalcitonin, sputum culture  - trend lactate   - iv fluids  - iv cefepime and iv vancomycin due to  frequent exposure to health care  - ID consult   - [place] [facility] [place]  - as per , currently in "round 5" of chemo at [facility]  - Doubt PNA in light of recent CT reads from [facility] which read as follows--- "2/8/2025 few bilateral nonspecific subcentimeter pulmonary nodules, to be followed up" subsequently, "unchanged few punctate pulmonary nodules and mild groundglass opacities. No suspicious lesion"  - will treat with abx for now in light of concern for evolving PNA  - full code as per .    DVT PPX: eliques   GI PPX: pantoprazole   DIET: dash   ACTIVITY:   CODE STATUS: full   DISPOSITION:     PENDING:   ID consult, cultures,          80-year-old female with past medical history of hypertension hyperlipidemia DVT on Eliquis recently being treated for pancreatic cancer on chemotherapy with chest port brought to ed for evaluation of the weakness    #Sepsis due to pneumonia.   #AMS (altered mental status).  #Deconditioning due to pancreatic cancer and chemotherapy   #Fall   - blood cultures  - urine cultures  - rvp, mrsa, procalcitonin, sputum culture  - trend lactate   - iv fluids  - iv cefepime and iv vancomycin due to  frequent exposure to health care  - ID consult   - [place] [facility] [place]  - as per , currently in "round 5" of chemo [place] [facility] [place]  - Doubt PNA in light of recent CT reads from [facility] [place] which read as follows--- "2/8/2025 few bilateral nonspecific subcentimeter pulmonary nodules, to be followed up" subsequently, "unchanged few punctate pulmonary nodules and mild groundglass opacities. No suspicious lesion"  - will treat with abx for now in light of concern for evolving PNA  - full code as per .    DVT PPX: eliques   GI PPX: pantoprazole   DIET: dash   ACTIVITY:   CODE STATUS: full   DISPOSITION:     PENDING:   ID consult, cultures,          80-year-old female with past medical history of hypertension hyperlipidemia DVT on Eliquis recently being treated for pancreatic cancer on chemotherapy with chest port brought to ed for evaluation of the weakness    #Sepsis due to pneumonia.   #AMS (altered mental status).  #Deconditioning due to pancreatic cancer and chemotherapy   #Fall   #Long QT interval  #New Onset Afib?  - blood cultures  - urine cultures  - rvp, mrsa, procalcitonin, sputum culture  - trend lactate   - iv fluids  - iv cefepime and iv vancomycin due to  frequent exposure to health care  - ID consult   - [place] [facility] [place]  - as per , currently in "round 5" of chemo at [facility] [place]  - Doubt PNA in light of recent CT reads from [facility] [place] which read as follows--- "2/8/2025 few bilateral nonspecific subcentimeter pulmonary nodules, to be followed up" subsequently, "unchanged few punctate pulmonary nodules and mild groundglass opacities. No suspicious lesion"  - will treat with abx for now in light of concern for evolving PNA  - full code as per .    DVT PPX: eliques   GI PPX: pantoprazole   DIET: dash   ACTIVITY:   CODE STATUS: full   DISPOSITION:     PENDING:   ID consult, cultures,          80-year-old female with past medical history of hypertension hyperlipidemia DVT on Eliquis recently being treated for pancreatic cancer on chemotherapy with chest port brought to ed for evaluation of the weakness    #Sepsis?  #AMS (altered mental status).  #Deconditioning due to pancreatic cancer and chemotherapy   #Fall   #Long QT interval  #New Onset Afib?  - blood cultures  - urine cultures  - rvp, mrsa, procalcitonin, sputum culture  - trend lactate   - iv fluids  - iv cefepime and iv vancomycin due to  frequent exposure to health care  - ID consult   - [place] [facility] [place]        DVT PPX: eliques   GI PPX: pantoprazole   DIET: dash   ACTIVITY:   CODE STATUS: full   DISPOSITION:     PENDING:   ID consult, cultures,

## 2025-06-27 NOTE — H&P ADULT - ATTENDING COMMENTS
Weakness, encaphalopathy due to chemo? likely due to known pancreatic cancer managed at [facility] [place]  - as per , currently in "round 5" of chemo at [facility] [place]  - pt has minor chronic cough, occasionally productive, unchanged from prior  - Doubt PNA in light of recent CT reads from [facility] which read as follows  - "2/8/2025 few bilateral nonspecific subcentimeter pulmonary nodules, to be followed up"  - subsequently, "unchanged few punctate pulmonary nodules and mild groundglass opacities. No suspicious lesion"  - will treat with abx for now in light of concern for evolving PNA  - full code as per

## 2025-06-27 NOTE — ED ADULT NURSE NOTE - NSFALLHARMRISKINTERV_ED_ALL_ED
Assistance OOB with selected safe patient handling equipment if applicable/Assistance with ambulation/Communicate risk of Fall with Harm to all staff, patient, and family/Monitor gait and stability/Monitor for mental status changes and reorient to person, place, and time, as needed/Move patient closer to nursing station/within visual sight of ED staff/Provide visual cue: red socks, yellow wristband, yellow gown, etc/Reinforce activity limits and safety measures with patient and family/Toileting schedule using arm’s reach rule for commode and bathroom/Use of alarms - bed, stretcher, chair and/or video monitoring/Bed in lowest position, wheels locked, appropriate side rails in place/Call bell, personal items and telephone in reach/Instruct patient to call for assistance before getting out of bed/chair/stretcher/Non-slip footwear applied when patient is off stretcher/[place] to call system/Physically safe environment - no spills, clutter or unnecessary equipment/Purposeful Proactive Rounding/Room/bathroom lighting operational, light cord in reach

## 2025-06-27 NOTE — ED PROVIDER NOTE - CLINICAL SUMMARY MEDICAL DECISION MAKING FREE TEXT BOX
ED workup with sepsis and pneumonia antibiotics given will consider ICU admit for IV antibiotics and fluids

## 2025-06-27 NOTE — H&P ADULT - NSHPPHYSICALEXAM_GEN_ALL_CORE
LOS:     VITALS:   T(C): 36.2 (06-27-25 @ 08:44), Max: 36.2 (06-27-25 @ 08:44)  HR: 118 (06-27-25 @ 16:53) (75 - 165)  BP: 129/77 (06-27-25 @ 16:53) (116/64 - 160/102)  RR: 20 (06-27-25 @ 16:53) (20 - 62)  SpO2: 100% (06-27-25 @ 10:21) (92% - 100%)    GENERAL: NAD, lying in bed comfortably, on oxygen via nasal cannula, weak and cachectic   HEAD:  Atraumatic, Normocephalic  EYES: EOMI, PERRLA, conjunctiva and sclera clear  ENT: Moist mucous membranes  NECK: Supple, No JVD  CHEST/LUNG: Clear to auscultation bilaterally; No rales, rhonchi, wheezing, or rubs.  HEART: Regular rate and rhythm; No murmurs, rubs, or gallops  ABDOMEN: BSx4; Soft, nontender, nondistended  EXTREMITIES:  2+ edema   NERVOUS SYSTEM:  awake, alert, but unable to say her name, date of birth, place , no focal deficits

## 2025-06-27 NOTE — ED PROVIDER NOTE - CARE PLAN
Principal Discharge DX:	Sepsis due to pneumonia  Secondary Diagnosis:	AMS (altered mental status)   1

## 2025-06-27 NOTE — ED PROVIDER NOTE - PATIENT'S SEXUAL ORIENTATION
Patient Education     Influenza (Adult)    Influenza is also called the flu. It is a viral illness that affects the air passages of your lungs. It is different from the common cold. The flu can easily be passed from one to person to another. It may be spread through the air by coughing and sneezing. Or it can be spread by touching the sick person and then touching your own eyes, nose, or mouth.  The flu starts 1 to 3 days after you are exposed to the flu virus. It may last for 1 to 2 weeks. You usually don’t need to take antibiotics unless you have a complication. This might be an ear or sinus infection or pneumonia.  Symptoms of the flu may be mild or severe. They can include extreme tiredness (wanting to stay in bed all day), chills, fevers, muscle aches, soreness with eye movement, headache, and a dry, hacking cough.  Home care  Follow these guidelines when caring for yourself at home:  · Avoid being around cigarette smoke, whether yours or other people’s.  · Acetaminophen or ibuprofen will help ease your fever, muscle aches, and headache. Don’t give aspirin to anyone younger than 18 who has the flu. Aspirin can harm the liver.  · Nausea and loss of appetite are common with the flu. Eat light meals. Drink 6 to 8 glasses of liquids every day. Good choices are water, sport drinks, soft drinks without caffeine, juices, tea, and soup. Extra fluids will also help loosen secretions in your nose and lungs.  · Over-the-counter cold medicines will not make the flu go away faster. But the medicines may help with coughing, sore throat, and congestion in your nose and sinuses. Don’t use a decongestant if you have high blood pressure.  · Stay home until your fever has been gone for at least 24 hours without using medicine to reduce fever.  Follow-up care  Follow up with your health care provider, or as advised, if you are not getting better over the next week.  If you are 65 or older, talk with your provider about getting a  pneumococcal vaccine every 5 years. You should also get this vaccine if you have chronic asthma or COPD. All adults should get a flu vaccine every fall. Ask your provider about this.  When to seek medical advice  Call your health care provider right away if any of these occur:  · Cough with lots of colored sputum (mucus) or blood in your sputum  · Chest pain, shortness of breath, wheezing, or difficulty breathing  · Severe headache, or face, neck, or ear pain  · New rash with fever  · Fever of 101°F (38°C) oral or higher that doesn’t get better with fever medicine  · Confusion, behavior change, or seizure  · Severe weakness or dizziness  · You get a fever or cough after getting better for a few days     © 9373-3886 [employer]. [address] [place] [address], [place] [address]. All rights reserved. This information is not intended as a substitute for professional medical care. Always follow your healthcare professional's instructions.            Heterosexual

## 2025-06-27 NOTE — PATIENT PROFILE ADULT - FALL HARM RISK - HARM RISK INTERVENTIONS
Assistance with ambulation/Assistance OOB with selected safe patient handling equipment/Communicate Risk of Fall with Harm to all staff/Discuss with provider need for PT consult/Monitor gait and stability/Reinforce activity limits and safety measures with patient and family/Tailored Fall Risk Interventions/Visual Cue: Yellow wristband and red socks/Bed in lowest position, wheels locked, appropriate side rails in place/Call bell, personal items and telephone in reach/Instruct patient to call for assistance before getting out of bed or chair/Non-slip footwear when patient is out of bed/[place] to call system/Physically safe environment - no spills, clutter or unnecessary equipment/Purposeful Proactive Rounding/Room/bathroom lighting operational, light cord in reach

## 2025-06-27 NOTE — PATIENT PROFILE ADULT - VISION (WITH CORRECTIVE LENSES IF THE PATIENT USUALLY WEARS THEM):
wears eyeglasses for reading/Partially impaired: cannot see medication labels or newsprint, but can see obstacles in path, and the surrounding layout; can count fingers at arm's length

## 2025-06-27 NOTE — H&P ADULT - HISTORY OF PRESENT ILLNESS
80-year-old female with past medical history of hypertension hyperlipidemia DVT on Eliquis recently being treated for pancreatic cancer on chemotherapy with chest port brought to ed for evaluation of the weakness. Hx taken from the patient  at bedside. States that for the past few days, patient has been very weak, dehydrated, not eating and drinking well, unable to walk and unable to take care of her self. Also reported that patient has been more sleepy recently and had a fall a few back.  said she normally has some dementia however the last 3 days, Patient was recently becoming more demented prior to the fall.  Patient complaining of diffuse body pain afterwards

## 2025-06-27 NOTE — CHART NOTE - NSCHARTNOTEFT_GEN_A_CORE
PDI	Current Rx	Drug Type	Rx Written	Rx Dispensed	Drug	Quantity	Days Supply	Prescriber Name	Prescriber MARILYN #	Payment Method	Dispenser  A	Y	O	06/11/2025	06/15/2025	morphine sulf er 30 mg tablet	90	30	Saloni Vazquez	[id number]	Insurance	[id number]  A	N	O	05/09/2025	05/14/2025	morphine sulf er 30 mg tablet	90	30	Saloni Vazquez	[id number]	Insurance	[id number]  A	N	O	05/07/2025	05/10/2025	hydromorphone 4 mg tablet	120	30	Saloni Vazquez	[id number]	Insurance	[id number]  A	N	O	04/11/2025	04/14/2025	morphine sulf er 30 mg tablet	90	30	Henna Pickens A	[id number]	Insurance	[id number]  A	N	O	04/07/2025	04/08/2025	hydromorphone 4 mg tablet	120	30	Saloni Vazquez	[id number]	Insurance	[id number]  A	N	O	04/05/2025	04/06/2025	hydromorphone 4 mg tablet	12	2	Sariah Jones NP	[id number]	Insurance	[id number]  A	N	O	04/01/2025	04/01/2025	fentanyl 25 mcg/hr patch	10	30	Saloni Vazquez	[id number]	Insurance	[id number]  A	N	O	03/27/2025	03/30/2025	hydromorphone 4 mg tablet	20	5	Frandy [facility]	[id number]	Insurance	[id number]  A	N	O	03/27/2025	03/30/2025	morphine sulf er 30 mg tablet	10	5	Frandy, [facility]	[id number]	Insurance	[id number]  A	N	O	03/13/2025	03/13/2025	morphine sulf er 30 mg tablet	34	17	Konstantin Smalls MD	[id number]	Insurance	[id number]  A	N	O	03/13/2025	03/13/2025	hydromorphone 4 mg tablet	68	17	Konstantin Smalls MD	[id number]	Insurance	[id number]  A	N	O	02/27/2025	02/28/2025	morphine sulf er 30 mg tablet	28	14	Konstantin Smalls MD	[id number]	Insurance	[id number]  A	N	O	02/24/2025	02/25/2025	hydromorphone 4 mg tablet	60	15	Konstantin Smalls MD	[id number]	Insurance	[id number]  A	N	O	12/18/2024	02/17/2025	tramadol hcl 50 mg tablet	90	30	Gema You	[id number]	[id number]  A	N	O	01/30/2025	02/13/2025	hydromorphone 4 mg tablet	30	10	Konstantin Smalls MD	[id number]	Insurance	[id number]  A	N	O	02/11/2025	02/11/2025	morphine sulf er 30 mg tablet	28	14	Konstantin Smalls MD	[id number]	Insurance	[id number]  A	N	O	12/18/2024	02/08/2025	tramadol hcl 50 mg tablet	21	7	Gema You	[id number]	[id number]  A	N	O	01/28/2025	01/29/2025	hydromorphone 4 mg tablet	60	10	Konstantin Smalls MD	[id number]	Insurance	[id number]  A	N	O	12/18/2024	01/27/2025	tramadol hcl 50 mg tablet	21	7	Gema You	[id number]	[id number]  A	N	O	01/22/2025	01/23/2025	hydromorphone 4 mg tablet	12	3	[facility]	[id number]	Insurance	[id number]  A	N	O	12/18/2024	01/20/2025	tramadol hcl 50 mg tablet	21	7	Gema You	[id number]	[id number]  A	N	O	12/18/2024	01/14/2025	tramadol hcl 50 mg tablet	21	7	Gema You	[id number]	[id number]  B	N	O	06/19/2024	09/18/2024	tramadol hcl 50 mg tablet	90	30	[place], Gema	[id number]	[id number]  B	N	O	06/19/2024	08/05/2024	tramadol hcl 50 mg tablet	90	30	Gema You	[id number]	[facility] #[id number]

## 2025-06-27 NOTE — ED ADULT NURSE NOTE - NS ED NURSE TRANSPORT WITH
CHIEF COMPLAINT/INTERVAL HISTORY:  Pt. seen and evaluated for pseudogout.  Pt. is in no distress.  Reports increase range of motion of right knee and less pain.      REVIEW OF SYSTEMS:  No fever, CP, SOB, or abdominal pain    Vital Signs Last 24 Hrs  T(C): 37.1 (08 May 2022 04:42), Max: 37.7 (07 May 2022 13:29)  T(F): 98.8 (08 May 2022 04:42), Max: 99.9 (07 May 2022 13:29)  HR: 104 (08 May 2022 04:42) (86 - 105)  BP: 129/72 (08 May 2022 04:42) (106/65 - 144/78)  BP(mean): --  RR: 19 (08 May 2022 04:42) (17 - 19)  SpO2: 93% (08 May 2022 04:42) (93% - 95%)    PHYSICAL EXAM:  GENERAL: NAD  HEENT: EOMI, hearing normal, conjunctiva and sclera clear  Chest: CTA bilaterally, no wheezing  CV: S1S2, RRR,   GI: soft, +BS, NT/ND  Musculoskeletal: no edema,  right knee without warmth or erythema.  Right knee not tender to palpation, increased ROM  Psychiatric: affect nL, mood nL  Skin: warm and dry    LABS:                        8.8    8.31  )-----------( 237      ( 08 May 2022 08:09 )             27.7     05-08    139  |  104  |  19  ----------------------------<  88  3.8   |  28  |  0.57    Ca    9.3      08 May 2022 08:09  Phos  2.8     05-07  Mg     1.9     05-08    TPro  5.9<L>  /  Alb  2.6<L>  /  TBili  0.8  /  DBili  x   /  AST  28  /  ALT  13  /  AlkPhos  111  05-07    PT/INR - ( 06 May 2022 18:29 )   PT: 17.9 sec;   INR: 1.52 ratio         PTT - ( 06 May 2022 18:29 )  PTT:29.7 sec  Urinalysis Basic - ( 07 May 2022 01:08 )    Color: Yellow / Appearance: Clear / S.015 / pH: x  Gluc: x / Ketone: Negative  / Bili: Negative / Urobili: Negative   Blood: x / Protein: Negative / Nitrite: Negative   Leuk Esterase: Negative / RBC: x / WBC x   Sq Epi: x / Non Sq Epi: x / Bacteria: x        
Patient interviewed and examined at bedside, doing well this morning and in no acute distress. Patient has remained afebrile. States that pain and swelling in the Right knee are reduced and range of motion at the knee is improved. Patient states she was able to stand with PT yesterday but has not yet been able to ambulate. Denies any radiation of the residual pain. Denies any weakness, numbness, or tingling in the Right lower extremity. Denies any fevers, chills, headaches, chest pain, shortness of breath, nausea, vomiting or other orthopaedic concerns or complaints at time of current encounter.        Patient's Right knee was aspirated overnight in the ED by Orthopaedic ACP yielded turbid yellow fluid that was subsequently sent for laboratory analysis inclusive of Cell Count, Crystals, Gram Stain, and Culture.    - Cell Count: ~33,000 (<50,000).   - Gram Stain: No organisms seen.  - Cultures: NGTD (As of 5/8/22).    - Crystal: + CPPD Crystals.          PHYSICAL EXAM:  T(C): 37.1 (08 May 2022 04:42), Max: 37.7 (07 May 2022 13:29)  T(F): 98.8 (08 May 2022 04:42), Max: 99.9 (07 May 2022 13:29)  HR: 104 (08 May 2022 04:42) (78 - 105)  BP: 129/72 (08 May 2022 04:42) (106/65 - 144/78)  RR: 19 (08 May 2022 04:42) (16 - 19)  SpO2: 93% (08 May 2022 04:42) (93% - 95%)        LABS:                        8.5    8.46  )-----------( 199      ( 07 May 2022 04:30 )             26.5     05-07    139  |  104  |  18  ----------------------------<  98  4.0   |  31  |  0.52    Ca    9.0      07 May 2022 04:30  Phos  2.8     05-07  Mg     1.5     05-07    TPro  5.9<L>  /  Alb  2.6<L>  /  TBili  0.8  /  DBili  x   /  AST  28  /  ALT  13  /  AlkPhos  111  05-07    PT/INR - ( 06 May 2022 18:29 )   PT: 17.9 sec;   INR: 1.52 ratio         PTT - ( 06 May 2022 18:29 )  PTT:29.7 sec          Gen: NAD, Resting comfortably    RLE:  Knee with mild residual suprapatellar effusion with mild, but improving TTP; No erythema and minimal warmth appreciated.   Skin clean, dry and intact with no signs of compromise; Aspiration site under dry dressings noted at the medial knee.   No bony tenderness to palpation.   +EHL/FHL/TA/GSC.   +SILT L3-S1.   + DP.   Full AROM/PROM with minimal pain on terminal flexion.  No micromotion tenderness.   Compartments soft and compressible.   No calf tenderness.         Imaging:  XR R Knee: No fracture or dislocation; Moderate osteoarthritic change; No lytic or blastic lesions; Stable dystrophic calcification; Suprapatellar effusion.      A/P:  Patient is a 74y Female w/ Right knee pseudogout.     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 Despite the patient's fever, the low Cell Count (33,000) and absence of organisms seen on Gram stain, as well as the patient's minimally warm, non-erythematous physical exam lower the suspicion for an acute joint infection on the differential. Bacterial cultures are also demonstrating no growth to date as of 5/8.   - Medical management appreciated.   - Multimodal analgesia.   - Recommend Rheumatology.   - WBAT RLE.   - DVT Ppx: per Primary Team.   - PT/OT.   - Ice and elevate as tolerated.   - No acute orthopaedic surgical intervention indicated at this time.   - Will continue to monitor aspirate cultures to completion to ensure no superimposition of infection on primary crystallopathy.   - Discussed with Dr. Whitehead who is aware of and in agreement with the above plan.       
CHIEF COMPLAINT/INTERVAL HISTORY:  Pt. seen and evaluated for right knee pain.  Possible septic arthritis vs pseudogout.  Pt. is in no distress.  Tolerating IV antibiotics.  Had fever 102.9 yesterday.  Reports increase range of motion of right knee.     REVIEW OF SYSTEMS:  +fever.  No CP, SOB, or abdominal pain    Vital Signs Last 24 Hrs  T(C): 37 (07 May 2022 09:44), Max: 39.4 (06 May 2022 16:33)  T(F): 98.6 (07 May 2022 09:44), Max: 102.9 (06 May 2022 16:33)  HR: 78 (07 May 2022 09:44) (78 - 109)  BP: 115/71 (07 May 2022 09:44) (110/66 - 139/76)  BP(mean): --  RR: 17 (07 May 2022 09:44) (16 - 17)  SpO2: 93% (07 May 2022 09:44) (93% - 99%)    PHYSICAL EXAM:  GENERAL: NAD  HEENT: EOMI, hearing normal, conjunctiva and sclera clear  Chest: CTA bilaterally, no wheezing  CV: S1S2, RRR,   GI: soft, +BS, NT/ND  Musculoskeletal: no edema, right knee without warmth or erythema.  Right knee not tender to palpation today  Psychiatric: affect nL, mood nL  Skin: warm and dry    LABS:                        8.5    8.46  )-----------( 199      ( 07 May 2022 04:30 )             26.5     05-07    139  |  104  |  18  ----------------------------<  98  4.0   |  31  |  0.52    Ca    9.0      07 May 2022 04:30  Phos  2.8     05-07  Mg     1.5     05-07    TPro  5.9<L>  /  Alb  2.6<L>  /  TBili  0.8  /  DBili  x   /  AST  28  /  ALT  13  /  AlkPhos  111  05-07    PT/INR - ( 06 May 2022 18:29 )   PT: 17.9 sec;   INR: 1.52 ratio         PTT - ( 06 May 2022 18:29 )  PTT:29.7 sec  Urinalysis Basic - ( 07 May 2022 01:08 )    Color: Yellow / Appearance: Clear / S.015 / pH: x  Gluc: x / Ketone: Negative  / Bili: Negative / Urobili: Negative   Blood: x / Protein: Negative / Nitrite: Negative   Leuk Esterase: Negative / RBC: x / WBC x   Sq Epi: x / Non Sq Epi: x / Bacteria: x        
Cardiac Monitor/Defib/ACLS/Rescue Kit/O2/BVM

## 2025-06-27 NOTE — ED PROVIDER NOTE - ATTENDING CONTRIBUTION TO CARE
74-year-old female to ED with hypertension hyperlipidemia and DVT on Eliquis presents after fall.  As per  noted the patient has been more confused complaining of diffuse body pain and difficulty ambulating so the ED.HPI ROS limited history given by .

## 2025-06-27 NOTE — ED ADULT TRIAGE NOTE - MODE OF ARRIVAL
Anticoagulation Progress Note      Comment:Spoke with patient-denies any new medications. Informed patient to take 2.5 mg warfarin today and then resume regular dose. Repeat INR 2 weeks  Assessment  Yes No   []  [x] Missed doses:   []  [x] Extra doses:   []  [] Significant medication changes (RX, OTC, Herbal):   []  [] High-risk maintenance medications:                []  [] Vitamin K / dietary changes (Vitamin K goal: ___ cups/week):   []  [] Bleeding / bruising:   []  [] Falls / injury:   []  [] Acute illness:    []  [] Alcohol intake:   []  [] Procedures / hospitalization / ER visits:   []  [] Other:    Plan     Anticoagulation Summary  As of 6/10/2022    INR goal:  2.5-3.5   TTR:  63.3 % (3.3 y)   INR used for dosing:  3.8 (6/9/2022)   Warfarin maintenance plan:  2.5 mg (5 mg x 0.5) every [place]; 5 mg (5 mg x 1) all other days   Weekly warfarin total:  32.5 mg   Plan last modified:  Haily Trejo RN (4/22/2022)   Next INR check:  6/23/2022   Priority:  Follow-Up - 4 Weeks   Target end date:      Indications    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]  Unspecified atrial fibrillation (CMS/HCC) [I48.91]  Long term (current) use of anticoagulants [Z79.01]  Longstanding persistent atrial fibrillation (CMS/HCC) [I48.11]             Anticoagulation Episode Summary     INR check location:      Preferred lab:      Send INR reminders to:  [facility] [place] [address] [address] POOL    Comments:  [facility] [phone]  service to anticoag and INR 8/27/21      Anticoagulation Care Providers     Provider Role Specialty Phone number    Bryce Haile MD [facility] Internal Medicine- Interventional Cardiology [phone]                [facility]

## 2025-06-27 NOTE — ED ADULT NURSE NOTE - SUICIDE SCREENING QUESTION 1
Patient unable to complete Plastic Surgeon Procedure Text (A): After obtaining clear surgical margins the patient was sent to plastics for surgical repair.  The patient understands they will receive post-surgical care and follow-up from the referring physician's office.

## 2025-06-27 NOTE — ED PROVIDER NOTE - OBJECTIVE STATEMENT
80-year-old female with past medical history of hypertension hyperlipidemia DVT on Eliquis recently being treated for pancreatic cancer presents today for 3 days of increased altered mental status lethargy and fall.   said she normally has some dementia however the last 3 days after patient had a fall in the kitchen.  Patient was recently becoming more demented prior to the fall.  Patient complaining of diffuse body pain afterwards not able to ambulate.   denies any fevers or chills

## 2025-06-27 NOTE — ED PROVIDER NOTE - PHYSICAL EXAMINATION
CONSTITUTIONAL: Frail, in no acute distress   SKIN: warm, dry  HEAD: Normocephalic; atraumatic.  EYES: PERRL, EOMI, no conjunctival erythema  ENT: No nasal discharge; airway clear.  NECK: Supple; non tender.  CARD: Regular rate and rhythm.   RESP: No wheezes, rales or rhonchi.  ABD: soft ntnd  EXT: decreased ROM no pain with ranging.  No clubbing, cyanosis or edema.   PSYCH: deminted

## 2025-06-28 LAB
A1C WITH ESTIMATED AVERAGE GLUCOSE RESULT: 8.5 % — HIGH (ref 4–5.6)
ALBUMIN SERPL ELPH-MCNC: 1.9 G/DL — LOW (ref 3.5–5.2)
ALBUMIN SERPL ELPH-MCNC: 2 G/DL — LOW (ref 3.5–5.2)
ALP SERPL-CCNC: 133 U/L — HIGH (ref 30–115)
ALP SERPL-CCNC: 144 U/L — HIGH (ref 30–115)
ALT FLD-CCNC: 53 U/L — HIGH (ref 0–41)
ALT FLD-CCNC: 68 U/L — HIGH (ref 0–41)
ANION GAP SERPL CALC-SCNC: 13 MMOL/L — SIGNIFICANT CHANGE UP (ref 7–14)
ANION GAP SERPL CALC-SCNC: 18 MMOL/L — HIGH (ref 7–14)
APTT BLD: 38.3 SEC — SIGNIFICANT CHANGE UP (ref 27–39.2)
AST SERPL-CCNC: 145 U/L — HIGH (ref 0–41)
AST SERPL-CCNC: 75 U/L — HIGH (ref 0–41)
B-OH-BUTYR SERPL-SCNC: 1.8 MMOL/L — HIGH
BASOPHILS # BLD AUTO: 0.06 K/UL — SIGNIFICANT CHANGE UP (ref 0–0.2)
BASOPHILS # BLD AUTO: 0.08 K/UL — SIGNIFICANT CHANGE UP (ref 0–0.2)
BASOPHILS NFR BLD AUTO: 0.8 % — SIGNIFICANT CHANGE UP (ref 0–2)
BASOPHILS NFR BLD AUTO: 1.2 % — SIGNIFICANT CHANGE UP (ref 0–2)
BILIRUB SERPL-MCNC: 0.6 MG/DL — SIGNIFICANT CHANGE UP (ref 0.2–1.2)
BILIRUB SERPL-MCNC: 0.6 MG/DL — SIGNIFICANT CHANGE UP (ref 0.2–1.2)
BUN SERPL-MCNC: 43 MG/DL — HIGH (ref 10–20)
BUN SERPL-MCNC: 47 MG/DL — HIGH (ref 10–20)
CALCIUM SERPL-MCNC: 9.1 MG/DL — SIGNIFICANT CHANGE UP (ref 8.4–10.5)
CALCIUM SERPL-MCNC: 9.1 MG/DL — SIGNIFICANT CHANGE UP (ref 8.4–10.5)
CHLORIDE SERPL-SCNC: 98 MMOL/L — SIGNIFICANT CHANGE UP (ref 98–110)
CHLORIDE SERPL-SCNC: 99 MMOL/L — SIGNIFICANT CHANGE UP (ref 98–110)
CHOLEST SERPL-MCNC: 107 MG/DL — SIGNIFICANT CHANGE UP
CO2 SERPL-SCNC: 16 MMOL/L — LOW (ref 17–32)
CO2 SERPL-SCNC: 21 MMOL/L — SIGNIFICANT CHANGE UP (ref 17–32)
CREAT SERPL-MCNC: 1 MG/DL — SIGNIFICANT CHANGE UP (ref 0.7–1.5)
CREAT SERPL-MCNC: 1.1 MG/DL — SIGNIFICANT CHANGE UP (ref 0.7–1.5)
CULTURE RESULTS: ABNORMAL
D DIMER BLD IA.RAPID-MCNC: 863 NG/ML DDU — HIGH
EGFR: 53 ML/MIN/1.73M2 — LOW
EGFR: 53 ML/MIN/1.73M2 — LOW
EGFR: 59 ML/MIN/1.73M2 — LOW
EGFR: 59 ML/MIN/1.73M2 — LOW
EOSINOPHIL # BLD AUTO: 0 K/UL — SIGNIFICANT CHANGE UP (ref 0–0.5)
EOSINOPHIL # BLD AUTO: 0 K/UL — SIGNIFICANT CHANGE UP (ref 0–0.5)
EOSINOPHIL NFR BLD AUTO: 0 % — SIGNIFICANT CHANGE UP (ref 0–6)
EOSINOPHIL NFR BLD AUTO: 0 % — SIGNIFICANT CHANGE UP (ref 0–6)
ESTIMATED AVERAGE GLUCOSE: 197 MG/DL — HIGH (ref 68–114)
GLUCOSE BLDC GLUCOMTR-MCNC: 193 MG/DL — HIGH (ref 70–99)
GLUCOSE BLDC GLUCOMTR-MCNC: 223 MG/DL — HIGH (ref 70–99)
GLUCOSE BLDC GLUCOMTR-MCNC: 254 MG/DL — HIGH (ref 70–99)
GLUCOSE BLDC GLUCOMTR-MCNC: 262 MG/DL — HIGH (ref 70–99)
GLUCOSE SERPL-MCNC: 207 MG/DL — HIGH (ref 70–99)
GLUCOSE SERPL-MCNC: 219 MG/DL — HIGH (ref 70–99)
GRAM STN FLD: ABNORMAL
HCT VFR BLD CALC: 29.4 % — LOW (ref 34.5–45)
HCT VFR BLD CALC: 31.4 % — LOW (ref 34.5–45)
HDLC SERPL-MCNC: 19 MG/DL — LOW
HGB BLD-MCNC: 9.3 G/DL — LOW (ref 11.5–15.5)
HGB BLD-MCNC: 9.9 G/DL — LOW (ref 11.5–15.5)
IMM GRANULOCYTES # BLD AUTO: 0.16 K/UL — HIGH (ref 0–0.07)
IMM GRANULOCYTES # BLD AUTO: 0.19 K/UL — HIGH (ref 0–0.07)
IMM GRANULOCYTES NFR BLD AUTO: 2.2 % — HIGH (ref 0–0.9)
IMM GRANULOCYTES NFR BLD AUTO: 2.8 % — HIGH (ref 0–0.9)
IMMATURE PLATELET FRACTION #: 3 K/UL — LOW (ref 4.7–11.1)
IMMATURE PLATELET FRACTION #: 3.2 K/UL — LOW (ref 4.7–11.1)
IMMATURE PLATELET FRACTION %: 8.2 % — HIGH (ref 1.6–4.9)
IMMATURE PLATELET FRACTION %: 9.3 % — HIGH (ref 1.6–4.9)
INR BLD: 1.35 RATIO — HIGH (ref 0.65–1.3)
LACTATE SERPL-SCNC: 2.2 MMOL/L — HIGH (ref 0.7–2)
LACTATE SERPL-SCNC: 2.2 MMOL/L — HIGH (ref 0.7–2)
LACTATE SERPL-SCNC: 3.2 MMOL/L — HIGH (ref 0.7–2)
LDLC SERPL-MCNC: 50 MG/DL — SIGNIFICANT CHANGE UP
LIPID PNL WITH DIRECT LDL SERPL: 50 MG/DL — SIGNIFICANT CHANGE UP
LYMPHOCYTES # BLD AUTO: 0.77 K/UL — LOW (ref 1–3.3)
LYMPHOCYTES # BLD AUTO: 0.95 K/UL — LOW (ref 1–3.3)
LYMPHOCYTES NFR BLD AUTO: 11.2 % — LOW (ref 13–44)
LYMPHOCYTES NFR BLD AUTO: 13.2 % — SIGNIFICANT CHANGE UP (ref 13–44)
MAGNESIUM SERPL-MCNC: 1.7 MG/DL — LOW (ref 1.8–2.4)
MCHC RBC-ENTMCNC: 29.3 PG — SIGNIFICANT CHANGE UP (ref 27–34)
MCHC RBC-ENTMCNC: 29.9 PG — SIGNIFICANT CHANGE UP (ref 27–34)
MCHC RBC-ENTMCNC: 31.5 G/DL — LOW (ref 32–36)
MCHC RBC-ENTMCNC: 31.6 G/DL — LOW (ref 32–36)
MCV RBC AUTO: 92.7 FL — SIGNIFICANT CHANGE UP (ref 80–100)
MCV RBC AUTO: 94.9 FL — SIGNIFICANT CHANGE UP (ref 80–100)
METHOD TYPE: SIGNIFICANT CHANGE UP
MONOCYTES # BLD AUTO: 0.22 K/UL — SIGNIFICANT CHANGE UP (ref 0–0.9)
MONOCYTES # BLD AUTO: 0.36 K/UL — SIGNIFICANT CHANGE UP (ref 0–0.9)
MONOCYTES NFR BLD AUTO: 3.2 % — SIGNIFICANT CHANGE UP (ref 2–14)
MONOCYTES NFR BLD AUTO: 5 % — SIGNIFICANT CHANGE UP (ref 2–14)
MSSA DNA SPEC QL NAA+PROBE: SIGNIFICANT CHANGE UP
NEUTROPHILS # BLD AUTO: 5.64 K/UL — SIGNIFICANT CHANGE UP (ref 1.8–7.4)
NEUTROPHILS # BLD AUTO: 5.69 K/UL — SIGNIFICANT CHANGE UP (ref 1.8–7.4)
NEUTROPHILS NFR BLD AUTO: 78.8 % — HIGH (ref 43–77)
NEUTROPHILS NFR BLD AUTO: 81.6 % — HIGH (ref 43–77)
NONHDLC SERPL-MCNC: 88 MG/DL — SIGNIFICANT CHANGE UP
NRBC # BLD AUTO: 0 K/UL — SIGNIFICANT CHANGE UP (ref 0–0)
NRBC # BLD AUTO: 0.02 K/UL — HIGH (ref 0–0)
NRBC # FLD: 0 K/UL — SIGNIFICANT CHANGE UP (ref 0–0)
NRBC # FLD: 0.02 K/UL — HIGH (ref 0–0)
NRBC BLD AUTO-RTO: 0 /100 WBCS — SIGNIFICANT CHANGE UP (ref 0–0)
PLATELET # BLD AUTO: 34 K/UL — LOW (ref 150–400)
PLATELET # BLD AUTO: 37 K/UL — LOW (ref 150–400)
PMV BLD: 12.5 FL — SIGNIFICANT CHANGE UP (ref 7–13)
PMV BLD: SIGNIFICANT CHANGE UP FL (ref 7–13)
POTASSIUM SERPL-MCNC: 4.2 MMOL/L — SIGNIFICANT CHANGE UP (ref 3.5–5)
POTASSIUM SERPL-MCNC: 4.5 MMOL/L — SIGNIFICANT CHANGE UP (ref 3.5–5)
POTASSIUM SERPL-SCNC: 4.2 MMOL/L — SIGNIFICANT CHANGE UP (ref 3.5–5)
POTASSIUM SERPL-SCNC: 4.5 MMOL/L — SIGNIFICANT CHANGE UP (ref 3.5–5)
PROT SERPL-MCNC: 4.1 G/DL — LOW (ref 6–8)
PROT SERPL-MCNC: 4.1 G/DL — LOW (ref 6–8)
PROTHROM AB SERPL-ACNC: 16 SEC — HIGH (ref 9.95–12.87)
RBC # BLD: 3.17 M/UL — LOW (ref 3.8–5.2)
RBC # BLD: 3.31 M/UL — LOW (ref 3.8–5.2)
RBC # FLD: 15.6 % — HIGH (ref 10.3–14.5)
RBC # FLD: 15.8 % — HIGH (ref 10.3–14.5)
SODIUM SERPL-SCNC: 132 MMOL/L — LOW (ref 135–146)
SODIUM SERPL-SCNC: 133 MMOL/L — LOW (ref 135–146)
SPECIMEN SOURCE: SIGNIFICANT CHANGE UP
TRIGL SERPL-MCNC: 240 MG/DL — HIGH
TSH SERPL-MCNC: 8.03 UIU/ML — HIGH (ref 0.27–4.2)
WBC # BLD: 6.9 K/UL — SIGNIFICANT CHANGE UP (ref 3.8–10.5)
WBC # BLD: 7.22 K/UL — SIGNIFICANT CHANGE UP (ref 3.8–10.5)
WBC # FLD AUTO: 6.9 K/UL — SIGNIFICANT CHANGE UP (ref 3.8–10.5)
WBC # FLD AUTO: 7.22 K/UL — SIGNIFICANT CHANGE UP (ref 3.8–10.5)

## 2025-06-28 PROCEDURE — 99233 SBSQ HOSP IP/OBS HIGH 50: CPT

## 2025-06-28 PROCEDURE — 93010 ELECTROCARDIOGRAM REPORT: CPT

## 2025-06-28 RX ORDER — DIGOXIN 250 UG/1
250 TABLET ORAL ONCE
Refills: 0 | Status: COMPLETED | OUTPATIENT
Start: 2025-06-28 | End: 2025-06-28

## 2025-06-28 RX ORDER — MAGNESIUM SULFATE 500 MG/ML
2 SYRINGE (ML) INJECTION ONCE
Refills: 0 | Status: COMPLETED | OUTPATIENT
Start: 2025-06-28 | End: 2025-06-28

## 2025-06-28 RX ORDER — SODIUM BICARBONATE 1 MEQ/ML
650 SYRINGE (ML) INTRAVENOUS THREE TIMES A DAY
Refills: 0 | Status: DISCONTINUED | OUTPATIENT
Start: 2025-06-28 | End: 2025-06-28

## 2025-06-28 RX ORDER — SODIUM BICARBONATE 1 MEQ/ML
0.13 SYRINGE (ML) INTRAVENOUS
Qty: 150 | Refills: 0 | Status: DISCONTINUED | OUTPATIENT
Start: 2025-06-28 | End: 2025-06-28

## 2025-06-28 RX ORDER — CEFAZOLIN SODIUM IN 0.9 % NACL 3 G/100 ML
2000 INTRAVENOUS SOLUTION, PIGGYBACK (ML) INTRAVENOUS EVERY 8 HOURS
Refills: 0 | Status: DISCONTINUED | OUTPATIENT
Start: 2025-06-28 | End: 2025-06-30

## 2025-06-28 RX ORDER — DILTIAZEM HYDROCHLORIDE 240 MG/1
15 TABLET, EXTENDED RELEASE ORAL ONCE
Refills: 0 | Status: COMPLETED | OUTPATIENT
Start: 2025-06-28 | End: 2025-06-28

## 2025-06-28 RX ORDER — SODIUM CHLORIDE 9 G/1000ML
1000 INJECTION, SOLUTION INTRAVENOUS ONCE
Refills: 0 | Status: COMPLETED | OUTPATIENT
Start: 2025-06-28 | End: 2025-06-28

## 2025-06-28 RX ADMIN — CEFEPIME 100 MILLIGRAM(S): 2 INJECTION, POWDER, FOR SOLUTION INTRAVENOUS at 06:44

## 2025-06-28 RX ADMIN — Medication 2 MILLIGRAM(S): at 18:20

## 2025-06-28 RX ADMIN — Medication 25 GRAM(S): at 10:08

## 2025-06-28 RX ADMIN — INSULIN LISPRO 3: 100 INJECTION, SOLUTION INTRAVENOUS; SUBCUTANEOUS at 08:36

## 2025-06-28 RX ADMIN — Medication 2 MILLIGRAM(S): at 17:56

## 2025-06-28 RX ADMIN — Medication 100 MILLIGRAM(S): at 21:07

## 2025-06-28 RX ADMIN — INSULIN LISPRO 2: 100 INJECTION, SOLUTION INTRAVENOUS; SUBCUTANEOUS at 17:28

## 2025-06-28 RX ADMIN — DILTIAZEM HYDROCHLORIDE 15 MILLIGRAM(S): 240 TABLET, EXTENDED RELEASE ORAL at 01:10

## 2025-06-28 RX ADMIN — INSULIN LISPRO 3: 100 INJECTION, SOLUTION INTRAVENOUS; SUBCUTANEOUS at 12:18

## 2025-06-28 RX ADMIN — Medication 50 MEQ/KG/HR: at 12:17

## 2025-06-28 RX ADMIN — Medication 250 MILLIGRAM(S): at 05:41

## 2025-06-28 RX ADMIN — METOPROLOL SUCCINATE 5 MILLIGRAM(S): 50 TABLET, EXTENDED RELEASE ORAL at 17:27

## 2025-06-28 RX ADMIN — SODIUM CHLORIDE 1000 MILLILITER(S): 9 INJECTION, SOLUTION INTRAVENOUS at 05:23

## 2025-06-28 RX ADMIN — DIGOXIN 250 MICROGRAM(S): 250 TABLET ORAL at 04:47

## 2025-06-28 RX ADMIN — METOPROLOL SUCCINATE 5 MILLIGRAM(S): 50 TABLET, EXTENDED RELEASE ORAL at 05:23

## 2025-06-28 RX ADMIN — Medication 25 GRAM(S): at 08:21

## 2025-06-28 RX ADMIN — SODIUM CHLORIDE 75 MILLILITER(S): 9 INJECTION, SOLUTION INTRAVENOUS at 12:17

## 2025-06-28 RX ADMIN — Medication 2 MILLIGRAM(S): at 22:37

## 2025-06-28 RX ADMIN — Medication 2 MILLIGRAM(S): at 22:07

## 2025-06-28 RX ADMIN — SODIUM CHLORIDE 75 MILLILITER(S): 9 INJECTION, SOLUTION INTRAVENOUS at 21:07

## 2025-06-28 NOTE — DIETITIAN INITIAL EVALUATION ADULT - NSFNSPHYEXAMSKINFT_GEN_A_CORE
Pressure Injury 1: DTI sacrum, Suspected deep tissue injury  Pressure Injury 2: none, none  Pressure Injury 3: none, none  Pressure Injury 4: none, none  Pressure Injury 5: none, none  Pressure Injury 6: none, none  Pressure Injury 7: none, none  Pressure Injury 8: none, none  Pressure Injury 9: none, none  Pressure Injury 10: none, none  Pressure Injury 11: none, none Pressure Injury 1: DTI sacrum, Suspected deep tissue injury

## 2025-06-28 NOTE — GOALS OF CARE CONVERSATION - ADVANCED CARE PLANNING - CONVERSATION DETAILS
Discussion made with  bedside,  was updated about the acidosis and bacteremia, about the plt drop and about the treatment. Writer asked bout previous patient wishes, patient had spoken previously on multiple occasions with her  and she does not want heroic measures. Writer explaind dni dnr to , and he wants to honor her wishes.

## 2025-06-28 NOTE — PROGRESS NOTE ADULT - SUBJECTIVE AND OBJECTIVE BOX
Patient is a 74y old  Female who presents with a chief complaint of weakness (2025 15:17)        HPI:   80-year-old female with past medical history of hypertension hyperlipidemia DVT on Eliquis recently being treated for pancreatic cancer on chemotherapy with chest port brought to ed for evaluation of the weakness. Hx taken from the patient  at bedside. States that for the past few days, patient has been very weak, dehydrated, not eating and drinking well, unable to walk and unable to take care of her self. Also reported that patient has been more sleepy recently and had a fall a few back.  said she normally has some dementia however the last 3 days, Patient was recently becoming more demented prior to the fall.  Patient complaining of diffuse body pain afterwards  (2025 15:17)      PAST MEDICAL & SURGICAL HISTORY:      FAMILY HISTORY:        Pt evaluated on rounds.  I reviewed the radiology tests and hospital record.    I reviewed previous notes on this patient.    Interval Events: No overnight events.      REVIEW OF SYSTEMS:   see HPI      OBJECTIVE:  ICU Vital Signs Last 24 Hrs  T(C): 36 (2025 23:11), Max: 36.2 (2025 08:44)  T(F): 96.8 (2025 23:11), Max: 97.2 (2025 08:44)  HR: 125 (2025 05:20) (75 - 168)  BP: 103/51 (2025 05:20) (86/51 - 160/102)  BP(mean): 72 (2025 05:20) (63 - 87)  ABP: --  ABP(mean): --  RR: 18 (2025 05:20) (9 - 62)  SpO2: 93% (2025 05:20) (76% - 100%)    O2 Parameters below as of 2025 18:47  Patient On (Oxygen Delivery Method): nasal cannula  O2 Flow (L/min): 3            -27 @ 07:01  -  06-28 @ 06:26  --------------------------------------------------------  IN: 675 mL / OUT: 401 mL / NET: 274 mL      CAPILLARY BLOOD GLUCOSE      POCT Blood Glucose.: 255 mg/dL (2025 21:42)        PHYSICAL EXAM:     · ENMT:   Airway patent,   Nasal mucosa clear.  Mouth with normal mucosa.   No thrush    · EYES:   Clear bilaterally,   pupils equal,   round and reactive to light.    · CARDIAC:   Normal rate,   regular rhythm.    Heart sounds S1, S2.   No murmurs, no rubs or gallops on auscultation  no edema        CAROTID:   normal systolic impulse  no bruits    · RESPIRATORY:   rales  normal chest expansion  no retractions or use of accessory muscles  percussion of chest demonstrates no hyperresonance or dullness    · GASTROINTESTINAL:  Abdomen soft,   non-tender,   + BS  liver/spleen not palpable    · SKIN:   Skin normal color for race,   warm, dry   No evidence of rash.    · HEME LYMPH:   no splenomegaly.  No cervical  lymphadenopathy.  no inguinal lymphadenopathy    HOSPITAL MEDICATIONS:  MEDICATIONS  (STANDING):  apixaban 5 milliGRAM(s) Oral every 12 hours  cefepime   IVPB 2000 milliGRAM(s) IV Intermittent every 12 hours  dextrose 5%. 1000 milliLiter(s) (50 mL/Hr) IV Continuous <Continuous>  dextrose 5%. 1000 milliLiter(s) (100 mL/Hr) IV Continuous <Continuous>  dextrose 50% Injectable 25 Gram(s) IV Push once  dextrose 50% Injectable 12.5 Gram(s) IV Push once  dextrose 50% Injectable 25 Gram(s) IV Push once  glucagon  Injectable 1 milliGRAM(s) IntraMuscular once  insulin lispro (ADMELOG) corrective regimen sliding scale   SubCutaneous three times a day before meals  lactated ringers. 1000 milliLiter(s) (75 mL/Hr) IV Continuous <Continuous>  pancrelipase  (CREON 12,000 Lipase Units) 2 Capsule(s) Oral three times a day with meals  pantoprazole    Tablet 40 milliGRAM(s) Oral before breakfast  sertraline 50 milliGRAM(s) Oral daily  tiZANidine 4 milliGRAM(s) Oral at bedtime  vancomycin  IVPB 1000 milliGRAM(s) IV Intermittent every 12 hours    MEDICATIONS  (PRN):  acetaminophen     Tablet .. 650 milliGRAM(s) Oral every 6 hours PRN Temp greater or equal to 38C (100.4F), Mild Pain (1 - 3)  dextrose Oral Gel 15 Gram(s) Oral once PRN Blood Glucose LESS THAN 70 milliGRAM(s)/deciliter  HYDROmorphone   Tablet 4 milliGRAM(s) Oral every 6 hours PRN Severe Pain (7 - 10)  metoprolol tartrate Injectable 5 milliGRAM(s) IV Push every 6 hours PRN heart rate above 110  morphine  IR 30 milliGRAM(s) Oral three times a day PRN Moderate Pain (4 - 6)    lactated ringers Bolus:   1000 milliLiter(s), IV Bolus, once, infuse over 60 Minute(s), Stop After 1 Doses  lactated ringers Bolus:   1000 milliLiter(s), IV Bolus, once, infuse over 60 Minute(s), Stop After 1 Doses  lactated ringers.: Solution, 1000 milliLiter(s) infuse at 75 mL/Hr  lactated ringers Bolus:   2000 milliLiter(s), IV Bolus, once, infuse over 60 Minute(s), Stop After 1 Doses      LABS:                        12.5   10.76 )-----------( 56       ( 2025 09:30 )             39.1         135  |  95[L]  |  32[H]  ----------------------------<  164[H]  4.4   |  20  |  0.9    Ca    10.1      2025 09:30    TPro  5.3[L]  /  Alb  2.5[L]  /  TBili  0.6  /  DBili  x   /  AST  65[H]  /  ALT  29  /  AlkPhos  135[H]      PT/INR - ( 2025 09:30 )   PT: 16.90 sec;   INR: 1.42 ratio         PTT - ( 2025 09:30 )  PTT:34.7 sec  Urinalysis Basic - ( 2025 10:20 )    Color: Dark Yellow / Appearance: Cloudy / S.024 / pH: x  Gluc: x / Ketone: x  / Bili: Small / Urobili: 1.0 mg/dL   Blood: x / Protein: 100 mg/dL / Nitrite: Negative   Leuk Esterase: Small / RBC: 10 /HPF / WBC 10 /HPF   Sq Epi: x / Non Sq Epi: x / Bacteria: Moderate /HPF        Venous Blood Gas:   @ 12:29  7.25/37/41/16/66.9  VBG Lactate: 4.6  Venous Blood Gas:   @ 08:58  7.28/46/17/22/15.4  VBG Lactate: 6.9        Urinalysis with Rflx Culture (collected 2025 10:20)    Culture - Blood (collected 2025 09:30)  Source: Blood Blood-Peripheral  Gram Stain (2025 03:38):    Growth in aerobic bottle: Gram Positive Cocci in Clusters    Growth in anaerobic bottle: Gram Positive Cocci in Clusters  Preliminary Report (2025 03:38):    Growth in aerobic bottle: Gram Positive Cocci in Clusters    Growth in anaerobic bottle: Gram Positive Cocci in Clusters    Culture - Blood (collected 2025 09:30)  Source: Blood Blood-Peripheral  Gram Stain (2025 04:38):    Growth in aerobic bottle: Gram Positive Cocci in Clusters    Growth in anaerobic bottle: Gram Positive Cocci in Clusters  Preliminary Report (2025 04:39):    Growth in aerobic bottle: Gram Positive Cocci in Clusters    Growth in anaerobic bottle: Gram Positive Cocci in Clusters    Direct identification is available within approximately 3-5    hours either by Blood Panel Multiplexed PCR or Direct    MALDI-TOF. Details: [email or web address]  Organism: Blood Culture PCR (2025 04:58)  Organism: Blood Culture PCR (2025 04:58)                    RADIOLOGY: Today I personally interpreted the latest CXR and other pertinent films.

## 2025-06-28 NOTE — DIETITIAN INITIAL EVALUATION ADULT - ORAL INTAKE PTA/DIET HISTORY
pt lethargic, as per spouse at bedside pt wound consume a regular diet with poor po intake since onset of pancreatic CA 10 months ago which led to an unintentional weight loss of 60#. NKFA or intolerances. just prior to admission pt would attempt to consume ensure daily however prefers chocolate milkshakes    pt is presently on a DASH/TLC diet 0% consumed 2/2 lethargy

## 2025-06-28 NOTE — CONSULT NOTE ADULT - SUBJECTIVE AND OBJECTIVE BOX
LINDA ONEAL  74y, Female  Allergies    No Known Allergies    Intolerances    LOS  1d    HPI  HPI:   80-year-old female with past medical history of hypertension hyperlipidemia DVT on Eliquis recently being treated for pancreatic cancer on chemotherapy with chest port brought to ed for evaluation of the weakness. Hx taken from the patient  at bedside. States that for the past few days, patient has been very weak, dehydrated, not eating and drinking well, unable to walk and unable to take care of her self. Also reported that patient has been more sleepy recently and had a fall a few back.  said she normally has some dementia however the last 3 days, Patient was recently becoming more demented prior to the fall.  Patient complaining of diffuse body pain afterwards  (27 Jun 2025 15:17)      INFECTIOUS DISEASE HISTORY:  ID consulted for antimicrobial recommendations.     Prior hospital charts reviewed [Yes]  Primary team notes reviewed [Yes]  Other consultant notes reviewed [Yes]    REVIEW OF SYSTEMS:  CONSTITUTIONAL: No fever or chills  HEENT: No sore throat  RESPIRATORY: No cough, no shortness of breath  CARDIOVASCULAR: No chest pain or palpitations  GASTROINTESTINAL: No abdominal or epigastric pain  GENITOURINARY: No dysuria  NEUROLOGICAL: No headache/dizziness  MSK: No joint pain, erythema, or swelling; no back pain  SKIN: No itching, rashes  All other ROS negative except noted above    PAST MEDICAL & SURGICAL HISTORY:      SOCIAL HISTORY:  - No recent travel    FAMILY HISTORY: No pertinent PMH for first degree relative    ANTIMICROBIALS:  ceFAZolin   IVPB 2000 every 8 hours      ANTIMICROBIALS (past 90 days):  MEDICATIONS  (STANDING):    cefepime   IVPB   100 mL/Hr IV Intermittent (06-27-25 @ 15:14)    cefepime   IVPB   100 mL/Hr IV Intermittent (06-28-25 @ 06:44)   100 mL/Hr IV Intermittent (06-27-25 @ 19:57)    vancomycin  IVPB   250 mL/Hr IV Intermittent (06-28-25 @ 05:41)   250 mL/Hr IV Intermittent (06-27-25 @ 20:47)        OTHER MEDS:   MEDICATIONS  (STANDING):  acetaminophen     Tablet .. 650 every 6 hours PRN  dextrose 50% Injectable 25 once  dextrose 50% Injectable 12.5 once  dextrose 50% Injectable 25 once  dextrose Oral Gel 15 once PRN  glucagon  Injectable 1 once  HYDROmorphone   Tablet 4 every 6 hours PRN  insulin lispro (ADMELOG) corrective regimen sliding scale  three times a day before meals  metoprolol tartrate Injectable 5 every 6 hours PRN  morphine  IR 30 three times a day PRN  pancrelipase  (CREON 12,000 Lipase Units) 2 three times a day with meals  pantoprazole    Tablet 40 before breakfast  sertraline 50 daily  tiZANidine 4 at bedtime      VITALS:  Vital Signs Last 24 Hrs  T(F): 96.6 (06-28-25 @ 15:00), Max: 97.2 (06-27-25 @ 08:44)    Vital Signs Last 24 Hrs  HR: 127 (06-28-25 @ 15:00) (98 - 168)  BP: 145/67 (06-28-25 @ 15:00) (75/48 - 145/67)  RR: 21 (06-28-25 @ 15:00)  SpO2: 95% (06-28-25 @ 15:00) (76% - 100%)  Wt(kg): --    EXAM:  GENERAL: Ill looking. Dry heaving.   HEAD: Left sided chest wall port scab noted.   NECK: Supple  CHEST/LUNG: Shallow breath sounds.   HEART: S1 S2  ABDOMEN: Soft, nontender  EXTREMITIES: No clubbing  NERVOUS SYSTEM: Awake. Confused  MSK: No joint erythema, swelling or pain    Labs:                        9.3    6.90  )-----------( 34       ( 28 Jun 2025 11:57 )             29.4     06-28    132[L]  |  98  |  43[H]  ----------------------------<  219[H]  4.5   |  16[L]  |  1.0    Ca    9.1      28 Jun 2025 05:52  Mg     1.7     06-28    TPro  4.1[L]  /  Alb  2.0[L]  /  TBili  0.6  /  DBili  x   /  AST  145[H]  /  ALT  68[H]  /  AlkPhos  144[H]  06-28      WBC Trend:  WBC Count: 6.90 (06-28-25 @ 11:57)  WBC Count: 7.22 (06-28-25 @ 05:52)  WBC Count: 10.76 (06-27-25 @ 09:30)      Auto Neutrophil #: 5.64 K/uL (06-28-25 @ 11:57)  Auto Neutrophil #: 5.69 K/uL (06-28-25 @ 05:52)  Auto Neutrophil #: 9.24 K/uL (06-27-25 @ 09:30)      Creatine Trend:  Creatinine: 1.0 (06-28)  Creatinine: 0.9 (06-27)      Liver Biochemical Testing Trend:  Alanine Aminotransferase (ALT/SGPT): 68 *H* (06-28)  Alanine Aminotransferase (ALT/SGPT): 29 (06-27)  Aspartate Aminotransferase (AST/SGOT): 145 (06-28-25 @ 05:52)  Aspartate Aminotransferase (AST/SGOT): 65 (06-27-25 @ 09:30)  Bilirubin Total: 0.6 (06-28)  Bilirubin Total: 0.6 (06-27)      Trend LDH      Auto Eosinophil %: 0.0 % (06-28-25 @ 11:57)  Auto Eosinophil %: 0.0 % (06-28-25 @ 05:52)  Auto Eosinophil %: 0.1 % (06-27-25 @ 09:30)      Urinalysis Basic - ( 28 Jun 2025 05:52 )    Color: x / Appearance: x / SG: x / pH: x  Gluc: 219 mg/dL / Ketone: x  / Bili: x / Urobili: x   Blood: x / Protein: x / Nitrite: x   Leuk Esterase: x / RBC: x / WBC x   Sq Epi: x / Non Sq Epi: x / Bacteria: x        MICROBIOLOGY:    Female    Urinalysis with Rflx Culture (collected 27 Jun 2025 10:20)    Culture - Blood (collected 27 Jun 2025 09:30)  Source: Blood Blood-Peripheral  Gram Stain (28 Jun 2025 03:38):    Growth in aerobic bottle: Gram Positive Cocci in Clusters    Growth in anaerobic bottle: Gram Positive Cocci in Clusters  Preliminary Report (28 Jun 2025 03:38):    Growth in aerobic bottle: Gram Positive Cocci in Clusters    Growth in anaerobic bottle: Gram Positive Cocci in Clusters    Culture - Blood (collected 27 Jun 2025 09:30)  Source: Blood Blood-Peripheral  Gram Stain (28 Jun 2025 04:38):    Growth in aerobic bottle: Gram Positive Cocci in Clusters    Growth in anaerobic bottle: Gram Positive Cocci in Clusters  Preliminary Report (28 Jun 2025 04:39):    Growth in aerobic bottle: Gram Positive Cocci in Clusters    Growth in anaerobic bottle: Gram Positive Cocci in Clusters    Direct identification is available within approximately 3-5    hours either by Blood Panel Multiplexed PCR or Direct    MALDI-TOF. Details: [email or web address]  Organism: Blood Culture PCR (28 Jun 2025 04:58)  Organism: Blood Culture PCR (28 Jun 2025 04:58)      Method Type: PCR      -  Methicillin SENSITIVE Staphylococcus aureus (MSSA): Detec Any isolate of Staphylococcus aureus from a blood culture is NOT considered a contaminant.        Procalcitonin: 0.97 (06-27)  Lactate, Blood: 2.2 (06-28 @ 11:57)  Lactate, Blood: 3.2 (06-28 @ 05:52)  Blood Gas Venous - Lactate: 4.6 (06-27 @ 12:29)  Blood Gas Venous - Lactate: 6.9 (06-27 @ 08:58)        INFLAMMATORY MARKERS      RADIOLOGY & ADDITIONAL TESTS:  I have personally reviewed the imagings.  CXR  Xray Chest 1 View- PORTABLE-Urgent:   ACC: [id number] EXAM:  XR CHEST PORTABLE URGENT 1V   ORDERED BY: TAL ARRIAZA     PROCEDURE DATE:  06/27/2025          INTERPRETATION:  Clinical History: Sepsis.    Comparison : Chest radiograph None.    Technique/Positioning: Frontal chest radiograph.    Findings:    Support devices: None.    Cardiac/mediastinum/hilum: Left chest port.    Lung parenchyma/Pleura: Retrocardiac density. 7 mm nodular right midlung   opacity versus summation of shadows. No pneumothorax.    Skeleton/soft tissues: Degenerative changes.    Impression:    Retrocardiac density. 7 mm nodular right midlung opacity versus summation   of shadows. Attention on CT which appears to been ordered by the time of   this dictation.    --- End of Report ---            ADEEL LUTHER MD; Attending Radiologist  This document has been electronically signed. Jun 27 2025 11:29AM (06-27-25 @ 09:52)      CT  CT Chest w/ IV Cont:   ACC: [id number] EXAM:  CT ABDOMEN AND PELVIS IC   ORDERED BY: TAL ARRIAZA     ACC: [id number] EXAM:  CT CHEST IC   ORDERED BY: TAL ARRIAZA     PROCEDURE DATE:  06/27/2025          INTERPRETATION:  CLINICAL STATEMENT: Fall  altered mental status. History   of pancreatic cancer.    TECHNIQUE: Contiguous axial CT images were obtained from the thoracic   inlet to the pubic symphysis following administration of intravenous   contrast.  95 cc administered of IOmnipaque 350 (accession [id number]) (5   cc discarded).  Oral contrast was not administered.  Reformatted images   in the coronal and sagittal planes, as well as MIP reconstructed images,   were acquired.      COMPARISON CT: None.    OTHER STUDIES USED FOR CORRELATION: None.      FINDINGS:    CHEST:    LUNGS/PLEURA/AIRWAYS: Small bilateral pleural effusions and bibasilar   atelectasis. Patchy consolidative opacities in the right upper and lower   lobe with scattered adjacent groundglass opacities. Few scattered solid   pulmonarynodules involving all lobes. For example, a left upper lobe   solid nodule measures 6 mm (303/48)..    MEDIASTINUM/THORACIC NODES: No lymphadenopathy. Atrophic thyroid gland.   Mildly distended fluid-filled esophagus HEART/GREAT VESSELS: Normal heart  size. No pericardial effusion..      ABDOMEN/PELVIS:    HEPATOBILIARY: Post cholecystectomy. Unremarkable liver.     SPLEEN: Unremarkable.    PANCREAS: Complex cystic structure in the region of the pancreatic   body/tail likely reflects a dilated main pancreatic duct measuring up to   1.9 cm with diffuse parenchymal atrophy and abrupt transition at the   level of the pancreatic head. A discrete pancreatic head mass not   visualized.Suggestion of mild soft tissue density surrounding the celiac   artery and the right hepatic artery (303/19)    ADRENAL GLANDS: Unremarkable.    KIDNEYS: Symmetric renal enhancement bilaterally. No hydronephrosis.   Subcentimeter hypodensities in both kidneys, too small to characterize.    ABDOMINOPELVIC NODES: No lymphadenopathy.    PELVIC ORGANS: Unremarkable.    PERITONEUM/MESENTERY/BOWEL: Gastric postsurgical changes. No bowel   obstruction obstruction or pneumoperitoneum. Diffuse mesenteric edema   noted. Moderate diffuse colonic stool burden.    BONES/SOFT TISSUES: No acute osseous abnormality. Degenerative changes of   the spine noted. Bilateral breast implants. Soft tissue anasarca.    OTHER: Left chest wall Mediport. Atherosclerotic vascular calcifications.   Left hepatic artery arises directly from the celiac axis.      IMPRESSION:  1.  No CT evidence of an acute traumatic intrathoracic or abdominopelvic   pathology.  2.  Patchy consolidative and nodular airspace opacities in the right   upper and lower lobes, suspicious for multifocal pneumonia in the   appropriate clinical setting. Follow-up is recommended.  3.  Few scattered bilateral solid pulmonary nodules. Metastatic disease   is a possibility. Follow-up per oncologic protocol.  4.  Diffuse pancreatic body/tail atrophy and ductal dilation with abrupt   transition at the level of the pancreatic head; suggestion of soft tissue   density surrounding the celiac and right hepatic artery, possibly tumor   encasement. Correlation with any available outside imaging is recommended.    --- End of Report ---            YO WILSON MD; Attending Radiologist  This document has been electronically signed. Jun 27 2025  1:59PM (06-27-25 @ 13:24)    < from: [facility] Duplex Lower Ext Vein Scan, Bilat (06.27.25 @ 19:38) >  RIGHT:  Normal compressibility of the RIGHT common femoral, femoral and popliteal   veins.  Doppler examination shows normal spontaneous and phasic flow.  No RIGHT calf vein thrombosis is detected.    LEFT:  Normal compressibility of the LEFT common femoral, femoral and popliteal   veins.  Doppler examination shows normal spontaneous and phasic flow.  No LEFT calf vein thrombosis is detected.    IMPRESSION:  No evidence of deep venous thrombosis in either lower extremity.    < end of copied text >  < from: CT Abdomen and Pelvis w/ IV Cont (06.27.25 @ 13:24) >  IMPRESSION:  1.  No CT evidence of an acute traumatic intrathoracic or abdominopelvic   pathology.  2.  Patchy consolidative and nodular airspace opacities in the right   upper and lower lobes, suspicious for multifocal pneumonia in the   appropriate clinical setting. Follow-up is recommended.  3.  Few scattered bilateral solid pulmonary nodules. Metastatic disease   is a possibility. Follow-up per oncologic protocol.  4.  Diffuse pancreatic body/tail atrophy and ductal dilation with abrupt   transition at the level of the pancreatic head; suggestion of soft tissue   density surrounding the celiac and right hepatic artery, possibly tumor   encasement. Correlation with any available outside imaging is recommended.    --- End of Report ---      < end of copied text >  < from: CT Head No Cont (06.27.25 @ 13:24) >    CT HEAD:  No acute intracranial pathology or hemorrhage. No acute calvarial   fracture.    CT CERVICAL SPINE:  No acute fracture or subluxation.    --- End of Report ---      < end of copied text >      CARDIOLOGY TESTING  12 Lead ECG:   Ventricular Rate 134 BPM    Atrial Rate 134 BPM    P-R Interval 134 ms    QRS Duration 106 ms    Q-T Interval 306 ms    QTC Calculation(Bazett) 456 ms    P Axis 61 degrees    R Axis -87 degrees    T Axis 50 degrees    Diagnosis Line Sinus tachycardia with occasional and consecutive Premature ventricular  complexes  Low voltage QRS  Incomplete right bundle branch block  Left anterior fascicular block  Possible Anterolateral infarct , age undetermined  Abnormal ECG (06-27-25 @ 12:49)  12 Lead ECG:   Ventricular Rate 174 BPM    QRS Duration 140 ms    Q-T Interval 340 ms    QTC Calculation(Bazett) 578 ms    R Axis -8 degrees    T Axis 84 degrees    Diagnosis Line *** Critical Test Result: High HR , Arrhythmia  Wide QRS tachycardia  Non-specific intra-ventricular conduction block  T wave abnormality, consider anterior ischemia  Abnormal ECG (06-27-25 @ 09:20)

## 2025-06-28 NOTE — OCCUPATIONAL THERAPY INITIAL EVALUATION ADULT - SPECIFY REASON(S)
As discussed with ANGEL Sotomayor, patient is lethargic with difficulty maintaining arousal. Hold OT IE at this time and will f/u as appropriate

## 2025-06-28 NOTE — DIETITIAN INITIAL EVALUATION ADULT - PERTINENT LABORATORY DATA
06-28    133[L]  |  99  |  47[H]  ----------------------------<  207[H]  4.2   |  21  |  1.1    Ca    9.1      28 Jun 2025 16:04  Mg     1.7     06-28    TPro  4.1[L]  /  Alb  1.9[L]  /  TBili  0.6  /  DBili  x   /  AST  75[H]  /  ALT  53[H]  /  AlkPhos  133[H]  06-28  POCT Blood Glucose.: 223 mg/dL (06-28-25 @ 16:20)  A1C with Estimated Average Glucose Result: 8.5 % (06-28-25 @ 05:52)   06-28    133[L]  |  99  |  47[H]  ----------------------------<  207[H]  4.2   |  21  |  1.1    Ca    9.1      28 Jun 2025 16:04  Mg     1.7     06-28    TPro  4.1[L]  /  Alb  1.9[L]  /  TBili  0.6  /  DBili  x   /  AST  75[H]  /  ALT  53[H]  /  AlkPhos  133[H]  06-28  POCT Blood Glucose.: 223 mg/dL (06-28-25 @ 16:20)  A1C with Estimated Average Glucose Result: 8.5 % (06-28-25 @ 05:52)                          9.3    6.90  )-----------( 34       ( 28 Jun 2025 11:57 )             29.4

## 2025-06-28 NOTE — CONSULT NOTE ADULT - ASSESSMENT
ASSESSMENT  This is a 80-year-old female with past medical history of hypertension hyperlipidemia DVT being treated for pancreatic cancer on chemotherapy with chest port brought to ed for evaluation of the weakness.    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    Covid/Flu/RSV negative   MRSA nares negative     RECOMMENDATIONS  -Repeat blood cultures daily.  -Follow up with urine cultures.   -TTE. May need JOSE if remains bacteremic.  -Surgery evaluation eventually, the chest wall port will need to be removed.  -D/C all other abx.  -IV Cefazolin 2 gram q 8 hours.   -Wound care evaluation for sacrum injury  -Off loading to prevent pressure sores and preventive measures to avoid aspiration. Guarded prognosis. GOC.    If any questions, please send a message or call on [employer] Teams  Please continue to update ID with any pertinent new laboratory or radiographic findings.    Amado Irwin M.D  Infectious Diseases Attending/   [employer] at [facility]

## 2025-06-28 NOTE — DIETITIAN INITIAL EVALUATION ADULT - PERTINENT MEDS FT
MEDICATIONS  (STANDING):  ceFAZolin   IVPB 2000 milliGRAM(s) IV Intermittent every 8 hours  dextrose 5%. 1000 milliLiter(s) (50 mL/Hr) IV Continuous <Continuous>  dextrose 5%. 1000 milliLiter(s) (100 mL/Hr) IV Continuous <Continuous>  dextrose 50% Injectable 25 Gram(s) IV Push once  dextrose 50% Injectable 12.5 Gram(s) IV Push once  dextrose 50% Injectable 25 Gram(s) IV Push once  glucagon  Injectable 1 milliGRAM(s) IntraMuscular once  insulin lispro (ADMELOG) corrective regimen sliding scale   SubCutaneous three times a day before meals  lactated ringers. 1000 milliLiter(s) (75 mL/Hr) IV Continuous <Continuous>  pancrelipase  (CREON 12,000 Lipase Units) 2 Capsule(s) Oral three times a day with meals  pantoprazole    Tablet 40 milliGRAM(s) Oral before breakfast  sertraline 50 milliGRAM(s) Oral daily  tiZANidine 4 milliGRAM(s) Oral at bedtime    MEDICATIONS  (PRN):  acetaminophen     Tablet .. 650 milliGRAM(s) Oral every 6 hours PRN Temp greater or equal to 38C (100.4F), Mild Pain (1 - 3)  dextrose Oral Gel 15 Gram(s) Oral once PRN Blood Glucose LESS THAN 70 milliGRAM(s)/deciliter  metoprolol tartrate Injectable 5 milliGRAM(s) IV Push every 6 hours PRN heart rate above 110  morphine  - Injectable 2 milliGRAM(s) IV Push every 4 hours PRN Moderate Pain (4 - 6)   MEDICATIONS  (STANDING):  ceFAZolin   IVPB 2000 milliGRAM(s) IV Intermittent every 8 hours  insulin lispro (ADMELOG) corrective regimen sliding scale   SubCutaneous three times a day before meals  lactated ringers. 1000 milliLiter(s) (75 mL/Hr) IV Continuous <Continuous>  pancrelipase  (CREON 12,000 Lipase Units) 2 Capsule(s) Oral three times a day with meals  pantoprazole    Tablet 40 milliGRAM(s) Oral before breakfast  sertraline 50 milliGRAM(s) Oral daily  tiZANidine 4 milliGRAM(s) Oral at bedtime    MEDICATIONS  (PRN):  acetaminophen     Tablet .. 650 milliGRAM(s) Oral every 6 hours PRN Temp greater or equal to 38C (100.4F), Mild Pain (1 - 3)  dextrose Oral Gel 15 Gram(s) Oral once PRN Blood Glucose LESS THAN 70 milliGRAM(s)/deciliter  metoprolol tartrate Injectable 5 milliGRAM(s) IV Push every 6 hours PRN heart rate above 110  morphine  - Injectable 2 milliGRAM(s) IV Push every 4 hours PRN Moderate Pain (4 - 6)

## 2025-06-28 NOTE — DIETITIAN INITIAL EVALUATION ADULT - NSFNSGIIOFT_GEN_A_CORE
06-27-25 @ 07:01  -  06-28-25 @ 07:00  --------------------------------------------------------  OUT:    Stool (mL): 1 mL  Total OUT: 1 mL    Total NET: -1 mL

## 2025-06-28 NOTE — DIETITIAN INITIAL EVALUATION ADULT - NS FNS DIET ORDER
Diet, Regular:   DASH/TLC {Sodium & Cholesterol Restricted} (DASH) (06-27-25 @ 16:26) [Active]

## 2025-06-28 NOTE — PROGRESS NOTE ADULT - ASSESSMENT
IMPRESSION:    #Sepsis POA  #AMS (altered mental status).  #Deconditioning due to pancreatic cancer and chemotherapy   #Fall   #Long QT interval  #New Onset Afib?    SUGGEST:    - blood cultures + MSSA  - ID evaluation  -repeat blood cultures  - urine cultures  - rvp, mrsa, procalcitonin, sputum culture  - trend lactate   - iv fluids  - iv cefepime and iv vancomycin due to  frequent exposure to health care  - ID consult   - pancreatic cancer managed at [facility]    DVT PPX: Eliquis   GI PPX: pantoprazole   DIET: dash   ACTIVITY:   CODE STATUS: full

## 2025-06-28 NOTE — PROGRESS NOTE ADULT - SUBJECTIVE AND OBJECTIVE BOX
pt even more somnelent today    T(F): , Max: 96.8 (06-27-25 @ 23:11)  HR: 118 (06-28-25 @ 20:15) (98 - 168)  BP: 139/69 (06-28-25 @ 20:15)  RR: 22 (06-28-25 @ 20:15)  SpO2: 93% (06-28-25 @ 20:15)  IN: 675 mL / OUT: 401 mL / NET: 274 mL    IN: 1150 mL / OUT: 0 mL / NET: 1150 mL      General: No apparent distress  Cardiovascular: S1, S2  Gastrointestinal: Soft, Non-tender, Non-distended  Respiratory: Good air entry bilaterally  Musculoskeletal: Moves all extremities  Lymphatic: No edema  Dermatologic: Skin dry  PT/INR - ( 28 Jun 2025 16:04 )   PT: 16.00 sec;   INR: 1.35 ratio         PTT - ( 28 Jun 2025 16:04 )  PTT:38.3 sec                        9.3    6.90  )-----------( 34       ( 28 Jun 2025 11:57 )             29.4     06-28    133[L]  |  99  |  47[H]  ----------------------------<  207[H]  4.2   |  21  |  1.1    Ca    9.1      28 Jun 2025 16:04  Mg     1.7     06-28    TPro  4.1[L]  /  Alb  1.9[L]  /  TBili  0.6  /  DBili  x   /  AST  75[H]  /  ALT  53[H]  /  AlkPhos  133[H]  06-28      Urinalysis with Rflx Culture (collected 27 Jun 2025 10:20)    Culture - Blood (collected 27 Jun 2025 09:30)  Source: Blood Blood-Peripheral  Gram Stain (28 Jun 2025 03:38):    Growth in aerobic bottle: Gram Positive Cocci in Clusters    Growth in anaerobic bottle: Gram Positive Cocci in Clusters  Preliminary Report (28 Jun 2025 03:38):    Growth in aerobic bottle: Gram Positive Cocci in Clusters    Growth in anaerobic bottle: Gram Positive Cocci in Clusters    Culture - Blood (collected 27 Jun 2025 09:30)  Source: Blood Blood-Peripheral  Gram Stain (28 Jun 2025 04:38):    Growth in aerobic bottle: Gram Positive Cocci in Clusters    Growth in anaerobic bottle: Gram Positive Cocci in Clusters  Preliminary Report (28 Jun 2025 04:39):    Growth in aerobic bottle: Gram Positive Cocci in Clusters    Growth in anaerobic bottle: Gram Positive Cocci in Clusters    Direct identification is available within approximately 3-5    hours either by Blood Panel Multiplexed PCR or Direct    MALDI-TOF. Details: [email or web address]  Organism: Blood Culture PCR (28 Jun 2025 04:58)  Organism: Blood Culture PCR (28 Jun 2025 04:58)

## 2025-06-28 NOTE — PROGRESS NOTE ADULT - ASSESSMENT
AMS   more lethargic today    pancreatic cancer and chemotherapy   - outpt f/u at [facility]      possible PNA?  - iv cefepime and iv vancomycin due to  frequent exposure to health care  -

## 2025-06-29 LAB
-  CLINDAMYCIN: SIGNIFICANT CHANGE UP
-  ERYTHROMYCIN: SIGNIFICANT CHANGE UP
-  GENTAMICIN: SIGNIFICANT CHANGE UP
-  OXACILLIN: SIGNIFICANT CHANGE UP
-  PENICILLIN: SIGNIFICANT CHANGE UP
-  RIFAMPIN: SIGNIFICANT CHANGE UP
-  TETRACYCLINE: SIGNIFICANT CHANGE UP
-  TRIMETHOPRIM/SULFAMETHOXAZOLE: SIGNIFICANT CHANGE UP
-  VANCOMYCIN: SIGNIFICANT CHANGE UP
ALBUMIN SERPL ELPH-MCNC: 1.8 G/DL — LOW (ref 3.5–5.2)
ALP SERPL-CCNC: 122 U/L — HIGH (ref 30–115)
ALT FLD-CCNC: 37 U/L — SIGNIFICANT CHANGE UP (ref 0–41)
ANION GAP SERPL CALC-SCNC: 14 MMOL/L — SIGNIFICANT CHANGE UP (ref 7–14)
AST SERPL-CCNC: 44 U/L — HIGH (ref 0–41)
BASOPHILS # BLD AUTO: 0.08 K/UL — SIGNIFICANT CHANGE UP (ref 0–0.2)
BASOPHILS NFR BLD AUTO: 1 % — SIGNIFICANT CHANGE UP (ref 0–2)
BILIRUB SERPL-MCNC: 0.6 MG/DL — SIGNIFICANT CHANGE UP (ref 0.2–1.2)
BUN SERPL-MCNC: 50 MG/DL — HIGH (ref 10–20)
CALCIUM SERPL-MCNC: 9 MG/DL — SIGNIFICANT CHANGE UP (ref 8.4–10.5)
CHLORIDE SERPL-SCNC: 98 MMOL/L — SIGNIFICANT CHANGE UP (ref 98–110)
CO2 SERPL-SCNC: 21 MMOL/L — SIGNIFICANT CHANGE UP (ref 17–32)
CREAT SERPL-MCNC: 1.2 MG/DL — SIGNIFICANT CHANGE UP (ref 0.7–1.5)
CULTURE RESULTS: ABNORMAL
EGFR: 48 ML/MIN/1.73M2 — LOW
EGFR: 48 ML/MIN/1.73M2 — LOW
EOSINOPHIL # BLD AUTO: 0 K/UL — SIGNIFICANT CHANGE UP (ref 0–0.5)
EOSINOPHIL NFR BLD AUTO: 0 % — SIGNIFICANT CHANGE UP (ref 0–6)
GLUCOSE BLDC GLUCOMTR-MCNC: 151 MG/DL — HIGH (ref 70–99)
GLUCOSE BLDC GLUCOMTR-MCNC: 156 MG/DL — HIGH (ref 70–99)
GLUCOSE BLDC GLUCOMTR-MCNC: 173 MG/DL — HIGH (ref 70–99)
GLUCOSE BLDC GLUCOMTR-MCNC: 178 MG/DL — HIGH (ref 70–99)
GLUCOSE SERPL-MCNC: 154 MG/DL — HIGH (ref 70–99)
HCT VFR BLD CALC: 28.4 % — LOW (ref 34.5–45)
HEPARIN-PF4 AB RESULT: 0.8 U/ML — SIGNIFICANT CHANGE UP (ref 0–0.9)
HGB BLD-MCNC: 9.2 G/DL — LOW (ref 11.5–15.5)
IMM GRANULOCYTES # BLD AUTO: 0.14 K/UL — HIGH (ref 0–0.07)
IMM GRANULOCYTES NFR BLD AUTO: 1.8 % — HIGH (ref 0–0.9)
IMMATURE PLATELET FRACTION #: 2.3 K/UL — LOW (ref 4.7–11.1)
IMMATURE PLATELET FRACTION %: 9.3 % — HIGH (ref 1.6–4.9)
LACTATE SERPL-SCNC: 2.3 MMOL/L — HIGH (ref 0.7–2)
LACTATE SERPL-SCNC: 2.4 MMOL/L — HIGH (ref 0.7–2)
LYMPHOCYTES # BLD AUTO: 1.36 K/UL — SIGNIFICANT CHANGE UP (ref 1–3.3)
LYMPHOCYTES NFR BLD AUTO: 17.1 % — SIGNIFICANT CHANGE UP (ref 13–44)
MAGNESIUM SERPL-MCNC: 2.5 MG/DL — HIGH (ref 1.8–2.4)
MCHC RBC-ENTMCNC: 29.7 PG — SIGNIFICANT CHANGE UP (ref 27–34)
MCHC RBC-ENTMCNC: 32.4 G/DL — SIGNIFICANT CHANGE UP (ref 32–36)
MCV RBC AUTO: 91.6 FL — SIGNIFICANT CHANGE UP (ref 80–100)
METHOD TYPE: SIGNIFICANT CHANGE UP
MONOCYTES # BLD AUTO: 0.41 K/UL — SIGNIFICANT CHANGE UP (ref 0–0.9)
MONOCYTES NFR BLD AUTO: 5.2 % — SIGNIFICANT CHANGE UP (ref 2–14)
NEUTROPHILS # BLD AUTO: 5.96 K/UL — SIGNIFICANT CHANGE UP (ref 1.8–7.4)
NEUTROPHILS NFR BLD AUTO: 74.9 % — SIGNIFICANT CHANGE UP (ref 43–77)
NRBC # BLD AUTO: 0 K/UL — SIGNIFICANT CHANGE UP (ref 0–0)
NRBC # FLD: 0 K/UL — SIGNIFICANT CHANGE UP (ref 0–0)
ORGANISM # SPEC MICROSCOPIC CNT: ABNORMAL
ORGANISM # SPEC MICROSCOPIC CNT: ABNORMAL
ORGANISM # SPEC MICROSCOPIC CNT: SIGNIFICANT CHANGE UP
PF4 HEPARIN CMPLX AB SER-ACNC: NEGATIVE — SIGNIFICANT CHANGE UP
PHOSPHATE SERPL-MCNC: 2.6 MG/DL — SIGNIFICANT CHANGE UP (ref 2.1–4.9)
PLATELET # BLD AUTO: 29 K/UL — LOW (ref 150–400)
PMV BLD: SIGNIFICANT CHANGE UP FL (ref 7–13)
POTASSIUM SERPL-MCNC: 4.2 MMOL/L — SIGNIFICANT CHANGE UP (ref 3.5–5)
POTASSIUM SERPL-SCNC: 4.2 MMOL/L — SIGNIFICANT CHANGE UP (ref 3.5–5)
PROT SERPL-MCNC: 4.1 G/DL — LOW (ref 6–8)
RBC # BLD: 3.1 M/UL — LOW (ref 3.8–5.2)
RBC # FLD: 15.9 % — HIGH (ref 10.3–14.5)
SODIUM SERPL-SCNC: 133 MMOL/L — LOW (ref 135–146)
SPECIMEN SOURCE: SIGNIFICANT CHANGE UP
WBC # BLD: 7.95 K/UL — SIGNIFICANT CHANGE UP (ref 3.8–10.5)
WBC # FLD AUTO: 7.95 K/UL — SIGNIFICANT CHANGE UP (ref 3.8–10.5)

## 2025-06-29 PROCEDURE — 99233 SBSQ HOSP IP/OBS HIGH 50: CPT

## 2025-06-29 PROCEDURE — 71045 X-RAY EXAM CHEST 1 VIEW: CPT | Mod: 26

## 2025-06-29 PROCEDURE — 74018 RADEX ABDOMEN 1 VIEW: CPT | Mod: 26

## 2025-06-29 RX ORDER — MEDPURA VITAMIN A AND D 95 G/100G
1 OINTMENT TOPICAL
Refills: 0 | Status: DISCONTINUED | OUTPATIENT
Start: 2025-06-29 | End: 2025-07-11

## 2025-06-29 RX ORDER — METOPROLOL SUCCINATE 50 MG/1
5 TABLET, EXTENDED RELEASE ORAL ONCE
Refills: 0 | Status: COMPLETED | OUTPATIENT
Start: 2025-06-29 | End: 2025-06-29

## 2025-06-29 RX ORDER — DILTIAZEM HYDROCHLORIDE 240 MG/1
15 TABLET, EXTENDED RELEASE ORAL ONCE
Refills: 0 | Status: COMPLETED | OUTPATIENT
Start: 2025-06-29 | End: 2025-06-29

## 2025-06-29 RX ORDER — DILTIAZEM HYDROCHLORIDE 240 MG/1
5 TABLET, EXTENDED RELEASE ORAL
Qty: 125 | Refills: 0 | Status: DISCONTINUED | OUTPATIENT
Start: 2025-06-29 | End: 2025-07-01

## 2025-06-29 RX ADMIN — Medication 100 MILLIGRAM(S): at 05:15

## 2025-06-29 RX ADMIN — Medication 2 MILLIGRAM(S): at 08:27

## 2025-06-29 RX ADMIN — Medication 2 MILLIGRAM(S): at 14:25

## 2025-06-29 RX ADMIN — DILTIAZEM HYDROCHLORIDE 15 MILLIGRAM(S): 240 TABLET, EXTENDED RELEASE ORAL at 21:00

## 2025-06-29 RX ADMIN — INSULIN LISPRO 1: 100 INJECTION, SOLUTION INTRAVENOUS; SUBCUTANEOUS at 17:05

## 2025-06-29 RX ADMIN — Medication 2 MILLIGRAM(S): at 21:19

## 2025-06-29 RX ADMIN — INSULIN LISPRO 1: 100 INJECTION, SOLUTION INTRAVENOUS; SUBCUTANEOUS at 08:25

## 2025-06-29 RX ADMIN — DILTIAZEM HYDROCHLORIDE 5 MG/HR: 240 TABLET, EXTENDED RELEASE ORAL at 21:43

## 2025-06-29 RX ADMIN — METOPROLOL SUCCINATE 5 MILLIGRAM(S): 50 TABLET, EXTENDED RELEASE ORAL at 20:12

## 2025-06-29 RX ADMIN — Medication 2 MILLIGRAM(S): at 20:49

## 2025-06-29 RX ADMIN — SODIUM CHLORIDE 75 MILLILITER(S): 9 INJECTION, SOLUTION INTRAVENOUS at 20:12

## 2025-06-29 RX ADMIN — MEDPURA VITAMIN A AND D 1 APPLICATION(S): 95 OINTMENT TOPICAL at 17:06

## 2025-06-29 RX ADMIN — Medication 2 MILLIGRAM(S): at 08:50

## 2025-06-29 RX ADMIN — Medication 2 MILLIGRAM(S): at 14:10

## 2025-06-29 RX ADMIN — METOPROLOL SUCCINATE 5 MILLIGRAM(S): 50 TABLET, EXTENDED RELEASE ORAL at 18:07

## 2025-06-29 RX ADMIN — Medication 100 MILLIGRAM(S): at 14:47

## 2025-06-29 RX ADMIN — Medication 100 MILLIGRAM(S): at 21:34

## 2025-06-29 RX ADMIN — METOPROLOL SUCCINATE 5 MILLIGRAM(S): 50 TABLET, EXTENDED RELEASE ORAL at 00:05

## 2025-06-29 NOTE — PROVIDER CONTACT NOTE (OTHER) - ACTION/TREATMENT ORDERED:
LUKAS Caldwell made aware of situation and assessed pt at bedside. Cardizem IVP 15mg to be ordered and administered. pt to be started on Cardizem gtt

## 2025-06-29 NOTE — PROGRESS NOTE ADULT - SUBJECTIVE AND OBJECTIVE BOX
Patient is a 74y old  Female who presents with a chief complaint of weakness (28 Jun 2025 20:52)        HPI:   80-year-old female with past medical history of hypertension hyperlipidemia DVT on Eliquis recently being treated for pancreatic cancer on chemotherapy with chest port brought to ed for evaluation of the weakness. Hx taken from the patient  at bedside. States that for the past few days, patient has been very weak, dehydrated, not eating and drinking well, unable to walk and unable to take care of her self. Also reported that patient has been more sleepy recently and had a fall a few back.  said she normally has some dementia however the last 3 days, Patient was recently becoming more demented prior to the fall.  Patient complaining of diffuse body pain afterwards  (27 Jun 2025 15:17)      PAST MEDICAL & SURGICAL HISTORY:      FAMILY HISTORY:        Pt evaluated on rounds.  I reviewed the radiology tests and hospital record.    I reviewed previous notes on this patient.    Interval Events: No overnight events.      REVIEW OF SYSTEMS:   see HPI      OBJECTIVE:  ICU Vital Signs Last 24 Hrs  T(C): 36 (28 Jun 2025 23:17), Max: 36 (28 Jun 2025 23:17)  T(F): 96.8 (28 Jun 2025 23:17), Max: 96.8 (28 Jun 2025 23:17)  HR: 130 (29 Jun 2025 00:05) (98 - 142)  BP: 132/56 (29 Jun 2025 00:05) (75/48 - 153/80)  BP(mean): 80 (29 Jun 2025 00:05) (56 - 109)    RR: 22 (29 Jun 2025 00:05) (12 - 30)  SpO2: 97% (29 Jun 2025 00:05) (89% - 99%)    O2 Parameters below as of 29 Jun 2025 00:05  Patient On (Oxygen Delivery Method): nasal cannula  O2 Flow (L/min): 3            06-27 @ 07:01  -  06-28 @ 07:00  --------------------------------------------------------  IN: 675 mL / OUT: 401 mL / NET: 274 mL    06-28 @ 07:01  - 06-29 @ 05:48  --------------------------------------------------------  IN: 2075 mL / OUT: 100 mL / NET: 1975 mL      CAPILLARY BLOOD GLUCOSE      POCT Blood Glucose.: 193 mg/dL (28 Jun 2025 21:20)        PHYSICAL EXAM:     · ENMT:   Airway patent,   Nasal mucosa clear.  Mouth with normal mucosa.   No thrush    · EYES:   Clear bilaterally,   pupils equal,   round and reactive to light.    · CARDIAC:   Normal rate,   regular rhythm.    Heart sounds S1, S2.   No murmurs, no rubs or gallops on auscultation  no edema        CAROTID:   normal systolic impulse  no bruits    · RESPIRATORY:   rales  normal chest expansion  no retractions or use of accessory muscles  percussion of chest demonstrates no hyperresonance or dullness    · GASTROINTESTINAL:  Abdomen soft,   non-tender,   + BS  liver/spleen not palpable    · SKIN:   Skin normal color for race,   warm, dry   No evidence of rash.    · HEME LYMPH:   no splenomegaly.  No cervical  lymphadenopathy.  no inguinal lymphadenopathy    HOSPITAL MEDICATIONS:  MEDICATIONS  (STANDING):  ceFAZolin   IVPB 2000 milliGRAM(s) IV Intermittent every 8 hours  dextrose 5%. 1000 milliLiter(s) (50 mL/Hr) IV Continuous <Continuous>  dextrose 5%. 1000 milliLiter(s) (100 mL/Hr) IV Continuous <Continuous>  dextrose 50% Injectable 25 Gram(s) IV Push once  dextrose 50% Injectable 12.5 Gram(s) IV Push once  dextrose 50% Injectable 25 Gram(s) IV Push once  glucagon  Injectable 1 milliGRAM(s) IntraMuscular once  insulin lispro (ADMELOG) corrective regimen sliding scale   SubCutaneous three times a day before meals  lactated ringers. 1000 milliLiter(s) (75 mL/Hr) IV Continuous <Continuous>  pancrelipase  (CREON 12,000 Lipase Units) 2 Capsule(s) Oral three times a day with meals  pantoprazole    Tablet 40 milliGRAM(s) Oral before breakfast  sertraline 50 milliGRAM(s) Oral daily  tiZANidine 4 milliGRAM(s) Oral at bedtime    MEDICATIONS  (PRN):  acetaminophen     Tablet .. 650 milliGRAM(s) Oral every 6 hours PRN Temp greater or equal to 38C (100.4F), Mild Pain (1 - 3)  dextrose Oral Gel 15 Gram(s) Oral once PRN Blood Glucose LESS THAN 70 milliGRAM(s)/deciliter  metoprolol tartrate Injectable 5 milliGRAM(s) IV Push every 6 hours PRN heart rate above 110  morphine  - Injectable 2 milliGRAM(s) IV Push every 4 hours PRN Moderate Pain (4 - 6)    lactated ringers Bolus:   1000 milliLiter(s), IV Bolus, once, infuse over 60 Minute(s), Stop After 1 Doses  lactated ringers Bolus:   1000 milliLiter(s), IV Bolus, once, infuse over 60 Minute(s), Stop After 1 Doses  lactated ringers.: Solution, 1000 milliLiter(s) infuse at 75 mL/Hr  lactated ringers Bolus:   2000 milliLiter(s), IV Bolus, once, infuse over 60 Minute(s), Stop After 1 Doses      LABS:                        9.3    6.90  )-----------( 34       ( 28 Jun 2025 11:57 )             29.4     06-28    133[L]  |  99  |  47[H]  ----------------------------<  207[H]  4.2   |  21  |  1.1    Ca    9.1      28 Jun 2025 16:04  Mg     1.7     06-28    TPro  4.1[L]  /  Alb  1.9[L]  /  TBili  0.6  /  DBili  x   /  AST  75[H]  /  ALT  53[H]  /  AlkPhos  133[H]  06-28    PT/INR - ( 28 Jun 2025 16:04 )   PT: 16.00 sec;   INR: 1.35 ratio         PTT - ( 28 Jun 2025 16:04 )  PTT:38.3 sec  Urinalysis Basic - ( 28 Jun 2025 16:04 )    Color: x / Appearance: x / SG: x / pH: x  Gluc: 207 mg/dL / Ketone: x  / Bili: x / Urobili: x   Blood: x / Protein: x / Nitrite: x   Leuk Esterase: x / RBC: x / WBC x   Sq Epi: x / Non Sq Epi: x / Bacteria: x        Venous Blood Gas:  06-27 @ 12:29  7.25/37/41/16/66.9  VBG Lactate: 4.6  Venous Blood Gas:  06-27 @ 08:58  7.28/46/17/22/15.4  VBG Lactate: 6.9        Urinalysis with Rflx Culture (collected 27 Jun 2025 10:20)    Culture - Blood (collected 27 Jun 2025 09:30)  Source: Blood Blood-Peripheral  Gram Stain (28 Jun 2025 03:38):    Growth in aerobic bottle: Gram Positive Cocci in Clusters    Growth in anaerobic bottle: Gram Positive Cocci in Clusters  Preliminary Report (28 Jun 2025 22:33):    Growth in aerobic and anaerobic bottles: Staphylococcus aureus    See previous culture [id number]    Culture - Blood (collected 27 Jun 2025 09:30)  Source: Blood Blood-Peripheral  Gram Stain (28 Jun 2025 04:38):    Growth in aerobic bottle: Gram Positive Cocci in Clusters    Growth in anaerobic bottle: Gram Positive Cocci in Clusters  Final Report (28 Jun 2025 22:32):    Growth in aerobic and anaerobic bottles: Staphylococcus aureus    Direct identification is available within approximately 3-5    hours either by Blood Panel Multiplexed PCR or Direct    MALDI-TOF. Details: [email or web address]  Organism: Blood Culture PCR (28 Jun 2025 04:58)  Organism: Blood Culture PCR (28 Jun 2025 04:58)                    RADIOLOGY: Today I personally interpreted the latest CXR and other pertinent films.

## 2025-06-29 NOTE — PROVIDER CONTACT NOTE (OTHER) - BACKGROUND
pt was given lopressor IVP 5mg at 20:12 with minimal effect. pt was also given morphine at 20:50 for s/s of pain.

## 2025-06-29 NOTE — PROGRESS NOTE ADULT - SUBJECTIVE AND OBJECTIVE BOX
Pt calm today, awakens to voice but minimally responsive and does not answer questions or follow commands    T(F): , Max: 96.9 (06-29-25 @ 23:15)  HR: 95 (06-29-25 @ 23:15) (87 - 151)  BP: 113/73 (06-29-25 @ 23:15)  RR: 22 (06-29-25 @ 23:15)  SpO2: 96% (06-29-25 @ 23:15)  IN: 2075 mL / OUT: 600 mL / NET: 1475 mL    IN: 1335 mL / OUT: 370 mL / NET: 965 mL      General: No apparent distress  Cardiovascular: S1, S2  Gastrointestinal: Soft, Non-tender, Non-distended  Respiratory: Good air entry bilaterally  Lymphatic: No edema  Neurologic: somnelent  Dermatologic: Skin dry  PT/INR - ( 28 Jun 2025 16:04 )   PT: 16.00 sec;   INR: 1.35 ratio         PTT - ( 28 Jun 2025 16:04 )  PTT:38.3 sec                        9.2    7.95  )-----------( 29       ( 29 Jun 2025 05:49 )             28.4     06-29    133[L]  |  98  |  50[H]  ----------------------------<  154[H]  4.2   |  21  |  1.2    Ca    9.0      29 Jun 2025 05:49  Phos  2.6     06-29  Mg     2.5     06-29    TPro  4.1[L]  /  Alb  1.8[L]  /  TBili  0.6  /  DBili  x   /  AST  44[H]  /  ALT  37  /  AlkPhos  122[H]  06-29      Urinalysis with Rflx Culture (collected 27 Jun 2025 10:20)    Culture - Urine (collected 27 Jun 2025 10:20)  Source: Clean Catch None  Preliminary Report (29 Jun 2025 23:38):    >100,000 CFU/ml Escherichia coli    Culture - Blood (collected 27 Jun 2025 09:30)  Source: Blood Blood-Peripheral  Gram Stain (28 Jun 2025 03:38):    Growth in aerobic bottle: Gram Positive Cocci in Clusters    Growth in anaerobic bottle: Gram Positive Cocci in Clusters  Final Report (29 Jun 2025 22:47):    Growth in aerobic and anaerobic bottles: Staphylococcus aureus    See previous culture [id number]    Culture - Blood (collected 27 Jun 2025 09:30)  Source: Blood Blood-Peripheral  Gram Stain (28 Jun 2025 04:38):    Growth in aerobic bottle: Gram Positive Cocci in Clusters    Growth in anaerobic bottle: Gram Positive Cocci in Clusters  Final Report (28 Jun 2025 22:32):    Growth in aerobic and anaerobic bottles: Staphylococcus aureus    Direct identification is available within approximately 3-5    hours either by Blood Panel Multiplexed PCR or Direct    MALDI-TOF. Details: [email or web address]  Organism: Blood Culture PCR  Staphylococcus aureus (29 Jun 2025 16:35)  Organism: Staphylococcus aureus (29 Jun 2025 16:35)  Organism: Blood Culture PCR (29 Jun 2025 16:35)

## 2025-06-29 NOTE — PROGRESS NOTE ADULT - ASSESSMENT
IMPRESSION:    #Sepsis POA  #AMS (altered mental status).  #Deconditioning due to pancreatic cancer and chemotherapy   #Fall   #Long QT interval  #New Onset Afib?    SUGGEST:    - blood cultures + MSSA  - ID evaluation  -repeat blood cultures  - urine cultures  - rvp, mrsa, procalcitonin, sputum culture  - trend lactate   - iv fluids  - iv cefepime and iv vancomycin due to  frequent exposure to health care  - ID consult   - pancreatic cancer managed at [facility]    DVT PPX: Eliquis   GI PPX: pantoprazole   DIET: dash   :                  IMPRESSION:    #Sepsis POA  #AMS (altered mental status).  #Deconditioning due to pancreatic cancer and chemotherapy   #Fall   #Long QT interval  #New Onset Afib?    SUGGEST:    - blood cultures + MSSA  - ID evaluation  -repeat blood cultures  - urine cultures  - rvp, mrsa, procalcitonin, sputum culture  - trend lactate   - will likely need port removed  - iv fluids  - iv cefepime and iv vancomycin due to  frequent exposure to health care  - ID consult   - pancreatic cancer managed at [facility]    DVT PPX: Eliquis   GI PPX: pantoprazole   DIET: dash   :

## 2025-06-29 NOTE — PROGRESS NOTE ADULT - ASSESSMENT
AMS   lethargic today    pancytopenia  - possible chemo related? vs infection related?  - heme/onc eval    pancreatic cancer and chemotherapy   - outpt f/u at [facility]      possible PNA?  - breathing comfortably  - iv cefepime and iv vancomycin

## 2025-06-29 NOTE — CHART NOTE - NSCHARTNOTEFT_GEN_A_CORE
Consult received, chart reviewed, patient not seen.   Will follow up this week for complete consult.   Please call [phone] PRN for urgent questions or concerns.

## 2025-06-30 ENCOUNTER — RESULT REVIEW (OUTPATIENT)
Age: 75
End: 2025-06-30

## 2025-06-30 DIAGNOSIS — G93.41 METABOLIC ENCEPHALOPATHY: ICD-10-CM

## 2025-06-30 DIAGNOSIS — Z51.5 ENCOUNTER FOR PALLIATIVE CARE: ICD-10-CM

## 2025-06-30 DIAGNOSIS — C25.9 MALIGNANT NEOPLASM OF PANCREAS, UNSPECIFIED: ICD-10-CM

## 2025-06-30 DIAGNOSIS — A41.9 SEPSIS, UNSPECIFIED ORGANISM: ICD-10-CM

## 2025-06-30 DIAGNOSIS — R78.81 BACTEREMIA: ICD-10-CM

## 2025-06-30 LAB
ALBUMIN SERPL ELPH-MCNC: 1.8 G/DL — LOW (ref 3.5–5.2)
ALP SERPL-CCNC: 132 U/L — HIGH (ref 30–115)
ALT FLD-CCNC: 15 U/L — SIGNIFICANT CHANGE UP (ref 0–41)
ANION GAP SERPL CALC-SCNC: 16 MMOL/L — HIGH (ref 7–14)
AST SERPL-CCNC: 24 U/L — SIGNIFICANT CHANGE UP (ref 0–41)
BASOPHILS # BLD AUTO: 0.05 K/UL — SIGNIFICANT CHANGE UP (ref 0–0.2)
BASOPHILS NFR BLD AUTO: 0.6 % — SIGNIFICANT CHANGE UP (ref 0–2)
BILIRUB SERPL-MCNC: 0.6 MG/DL — SIGNIFICANT CHANGE UP (ref 0.2–1.2)
BLD GP AB SCN SERPL QL: SIGNIFICANT CHANGE UP
BUN SERPL-MCNC: 51 MG/DL — HIGH (ref 10–20)
CALCIUM SERPL-MCNC: 9.4 MG/DL — SIGNIFICANT CHANGE UP (ref 8.4–10.5)
CHLORIDE SERPL-SCNC: 101 MMOL/L — SIGNIFICANT CHANGE UP (ref 98–110)
CO2 SERPL-SCNC: 20 MMOL/L — SIGNIFICANT CHANGE UP (ref 17–32)
CREAT SERPL-MCNC: 1 MG/DL — SIGNIFICANT CHANGE UP (ref 0.7–1.5)
EGFR: 59 ML/MIN/1.73M2 — LOW
EGFR: 59 ML/MIN/1.73M2 — LOW
EOSINOPHIL # BLD AUTO: 0 K/UL — SIGNIFICANT CHANGE UP (ref 0–0.5)
EOSINOPHIL NFR BLD AUTO: 0 % — SIGNIFICANT CHANGE UP (ref 0–6)
GLUCOSE BLDC GLUCOMTR-MCNC: 198 MG/DL — HIGH (ref 70–99)
GLUCOSE SERPL-MCNC: 185 MG/DL — HIGH (ref 70–99)
GRAM STN FLD: ABNORMAL
HCT VFR BLD CALC: 26.7 % — LOW (ref 34.5–45)
HGB BLD-MCNC: 8.7 G/DL — LOW (ref 11.5–15.5)
IMM GRANULOCYTES # BLD AUTO: 0.17 K/UL — HIGH (ref 0–0.07)
IMM GRANULOCYTES NFR BLD AUTO: 1.9 % — HIGH (ref 0–0.9)
IMMATURE PLATELET FRACTION #: 3.1 K/UL — LOW (ref 4.7–11.1)
IMMATURE PLATELET FRACTION %: 10.7 % — HIGH (ref 1.6–4.9)
LYMPHOCYTES # BLD AUTO: 1.17 K/UL — SIGNIFICANT CHANGE UP (ref 1–3.3)
LYMPHOCYTES NFR BLD AUTO: 13.3 % — SIGNIFICANT CHANGE UP (ref 13–44)
MAGNESIUM SERPL-MCNC: 2.4 MG/DL — SIGNIFICANT CHANGE UP (ref 1.8–2.4)
MCHC RBC-ENTMCNC: 29.9 PG — SIGNIFICANT CHANGE UP (ref 27–34)
MCHC RBC-ENTMCNC: 32.6 G/DL — SIGNIFICANT CHANGE UP (ref 32–36)
MCV RBC AUTO: 91.8 FL — SIGNIFICANT CHANGE UP (ref 80–100)
MONOCYTES # BLD AUTO: 0.16 K/UL — SIGNIFICANT CHANGE UP (ref 0–0.9)
MONOCYTES NFR BLD AUTO: 1.8 % — LOW (ref 2–14)
NEUTROPHILS # BLD AUTO: 7.23 K/UL — SIGNIFICANT CHANGE UP (ref 1.8–7.4)
NEUTROPHILS NFR BLD AUTO: 82.4 % — HIGH (ref 43–77)
NRBC # BLD AUTO: 0 K/UL — SIGNIFICANT CHANGE UP (ref 0–0)
NRBC # FLD: 0 K/UL — SIGNIFICANT CHANGE UP (ref 0–0)
PLATELET # BLD AUTO: 29 K/UL — LOW (ref 150–400)
PMV BLD: SIGNIFICANT CHANGE UP FL (ref 7–13)
POTASSIUM SERPL-MCNC: 3.6 MMOL/L — SIGNIFICANT CHANGE UP (ref 3.5–5)
POTASSIUM SERPL-SCNC: 3.6 MMOL/L — SIGNIFICANT CHANGE UP (ref 3.5–5)
PROT SERPL-MCNC: 4.4 G/DL — LOW (ref 6–8)
RBC # BLD: 2.91 M/UL — LOW (ref 3.8–5.2)
RBC # FLD: 15.9 % — HIGH (ref 10.3–14.5)
SODIUM SERPL-SCNC: 137 MMOL/L — SIGNIFICANT CHANGE UP (ref 135–146)
SPECIMEN SOURCE: SIGNIFICANT CHANGE UP
SPECIMEN SOURCE: SIGNIFICANT CHANGE UP
WBC # BLD: 8.78 K/UL — SIGNIFICANT CHANGE UP (ref 3.8–10.5)
WBC # FLD AUTO: 8.78 K/UL — SIGNIFICANT CHANGE UP (ref 3.8–10.5)

## 2025-06-30 PROCEDURE — 99223 1ST HOSP IP/OBS HIGH 75: CPT | Mod: 2W

## 2025-06-30 PROCEDURE — 99233 SBSQ HOSP IP/OBS HIGH 50: CPT

## 2025-06-30 PROCEDURE — 99223 1ST HOSP IP/OBS HIGH 75: CPT

## 2025-06-30 PROCEDURE — 93306 TTE W/DOPPLER COMPLETE: CPT | Mod: 26

## 2025-06-30 PROCEDURE — 71045 X-RAY EXAM CHEST 1 VIEW: CPT | Mod: 26

## 2025-06-30 RX ORDER — BACITRACIN 500 UNIT/G
1 OINTMENT (GRAM) TOPICAL
Refills: 0 | Status: DISCONTINUED | OUTPATIENT
Start: 2025-06-30 | End: 2025-07-11

## 2025-06-30 RX ORDER — NAFCILLIN 2 G/100ML
2 INJECTION, SOLUTION INTRAVENOUS EVERY 4 HOURS
Refills: 0 | Status: DISCONTINUED | OUTPATIENT
Start: 2025-06-30 | End: 2025-07-07

## 2025-06-30 RX ORDER — HYDROMORPHONE/SOD CHLOR,ISO/PF 2 MG/10 ML
0.5 SYRINGE (ML) INJECTION
Refills: 0 | Status: DISCONTINUED | OUTPATIENT
Start: 2025-06-30 | End: 2025-07-07

## 2025-06-30 RX ADMIN — INSULIN LISPRO 1: 100 INJECTION, SOLUTION INTRAVENOUS; SUBCUTANEOUS at 07:55

## 2025-06-30 RX ADMIN — NAFCILLIN 200 GRAM(S): 2 INJECTION, SOLUTION INTRAVENOUS at 17:47

## 2025-06-30 RX ADMIN — DILTIAZEM HYDROCHLORIDE 5 MG/HR: 240 TABLET, EXTENDED RELEASE ORAL at 18:43

## 2025-06-30 RX ADMIN — DILTIAZEM HYDROCHLORIDE 5 MG/HR: 240 TABLET, EXTENDED RELEASE ORAL at 21:49

## 2025-06-30 RX ADMIN — NAFCILLIN 200 GRAM(S): 2 INJECTION, SOLUTION INTRAVENOUS at 21:49

## 2025-06-30 RX ADMIN — Medication 1 APPLICATION(S): at 05:25

## 2025-06-30 RX ADMIN — Medication 100 MILLIGRAM(S): at 05:24

## 2025-06-30 RX ADMIN — INSULIN LISPRO 1: 100 INJECTION, SOLUTION INTRAVENOUS; SUBCUTANEOUS at 12:43

## 2025-06-30 RX ADMIN — MEDPURA VITAMIN A AND D 1 APPLICATION(S): 95 OINTMENT TOPICAL at 05:25

## 2025-06-30 RX ADMIN — MEDPURA VITAMIN A AND D 1 APPLICATION(S): 95 OINTMENT TOPICAL at 17:48

## 2025-06-30 RX ADMIN — SODIUM CHLORIDE 75 MILLILITER(S): 9 INJECTION, SOLUTION INTRAVENOUS at 21:50

## 2025-06-30 NOTE — PROGRESS NOTE ADULT - SUBJECTIVE AND OBJECTIVE BOX
LINDA ONEAL 74y Female  MRN#: [id number]     Hospital Day: 3d      SUBJECTIVE  No acute events overnight, pt seen and examined this morning.                                          ----------------------------------------------------------  OBJECTIVE  PAST MEDICAL & SURGICAL HISTORY                                            -----------------------------------------------------------  ALLERGIES:  No Known Allergies                                            ------------------------------------------------------------    HOME MEDICATIONS  Home Medications:  Claritin 10 mg oral tablet: 1 tab(s) orally once a day (27 Jun 2025 16:31)  Creon 24,000 units oral delayed release capsule: 1 cap(s) orally 3 times a day (27 Jun 2025 16:31)  docusate: 3 tab(s) orally once a day (27 Jun 2025 16:31)  Eliquis 5 mg oral tablet: 1 tab(s) orally 2 times a day (27 Jun 2025 16:31)  glimepiride 2 mg oral tablet: 1 tab(s) orally once a day (27 Jun 2025 16:31)  HYDROmorphone 4 mg oral tablet: 1 tab(s) orally every 6 hours as needed for  severe pain (27 Jun 2025 16:31)  lansoprazole 30 mg oral delayed release capsule: 1 cap(s) orally once a day (27 Jun 2025 16:31)  morphine 30 mg oral tablet: 1 tab(s) orally 3 times a day as needed for  moderate pain (27 Jun 2025 16:31)  sertraline 50 mg oral tablet: 1 tab(s) orally once a day (27 Jun 2025 16:31)  tiZANidine 4 mg oral capsule: 2 cap(s) orally once a day (at bedtime) (27 Jun 2025 16:31)                           MEDICATIONS:  STANDING MEDICATIONS  ceFAZolin   IVPB 2000 milliGRAM(s) IV Intermittent every 8 hours  chlorhexidine 2% Cloths 1 Application(s) Topical <User Schedule>  dextrose 5%. 1000 milliLiter(s) IV Continuous <Continuous>  dextrose 5%. 1000 milliLiter(s) IV Continuous <Continuous>  dextrose 50% Injectable 25 Gram(s) IV Push once  dextrose 50% Injectable 12.5 Gram(s) IV Push once  dextrose 50% Injectable 25 Gram(s) IV Push once  diltiazem Infusion 5 mG/Hr IV Continuous <Continuous>  glucagon  Injectable 1 milliGRAM(s) IntraMuscular once  insulin lispro (ADMELOG) corrective regimen sliding scale   SubCutaneous three times a day before meals  lactated ringers. 1000 milliLiter(s) IV Continuous <Continuous>  pancrelipase  (CREON 12,000 Lipase Units) 2 Capsule(s) Oral three times a day with meals  pantoprazole    Tablet 40 milliGRAM(s) Oral before breakfast  sertraline 50 milliGRAM(s) Oral daily  tiZANidine 4 milliGRAM(s) Oral at bedtime  vitamin A & D Ointment 1 Application(s) Topical two times a day    PRN MEDICATIONS  acetaminophen     Tablet .. 650 milliGRAM(s) Oral every 6 hours PRN  dextrose Oral Gel 15 Gram(s) Oral once PRN  metoprolol tartrate Injectable 5 milliGRAM(s) IV Push every 6 hours PRN  morphine  - Injectable 2 milliGRAM(s) IV Push every 4 hours PRN                                            ------------------------------------------------------------  VITAL SIGNS: Last 24 Hours  T(C): 36.3 (30 Jun 2025 07:20), Max: 36.3 (30 Jun 2025 07:20)  T(F): 97.3 (30 Jun 2025 07:20), Max: 97.3 (30 Jun 2025 07:20)  HR: 94 (30 Jun 2025 07:20) (86 - 151)  BP: 118/57 (30 Jun 2025 07:20) (105/57 - 150/65)  BP(mean): 80 (30 Jun 2025 07:20) (76 - 93)  RR: 18 (30 Jun 2025 07:20) (18 - 23)  SpO2: 95% (30 Jun 2025 07:20) (93% - 99%)      06-29-25 @ 07:01  -  06-30-25 @ 07:00  --------------------------------------------------------  IN: 2075 mL / OUT: 670 mL / NET: 1405 mL                                             --------------------------------------------------------------  LABS:                        8.7    8.78  )-----------( 29       ( 30 Jun 2025 05:46 )             26.7     06-30    137  |  101  |  51[H]  ----------------------------<  185[H]  3.6   |  20  |  1.0    Ca    9.4      30 Jun 2025 05:46  Phos  2.6     06-29  Mg     2.4     06-30    TPro  4.4[L]  /  Alb  1.8[L]  /  TBili  0.6  /  DBili  x   /  AST  24  /  ALT  15  /  AlkPhos  132[H]  06-30    PT/INR - ( 28 Jun 2025 16:04 )   PT: 16.00 sec;   INR: 1.35 ratio         PTT - ( 28 Jun 2025 16:04 )  PTT:38.3 sec  Urinalysis Basic - ( 30 Jun 2025 05:46 )    Color: x / Appearance: x / SG: x / pH: x  Gluc: 185 mg/dL / Ketone: x  / Bili: x / Urobili: x   Blood: x / Protein: x / Nitrite: x   Leuk Esterase: x / RBC: x / WBC x   Sq Epi: x / Non Sq Epi: x / Bacteria: x        Lactate, Blood: 2.4 mmol/L *H* (06-29-25 @ 15:44)        Culture - Blood (collected 29 Jun 2025 05:49)  Source: Blood None  Gram Stain (30 Jun 2025 12:29):    Growth in aerobic bottle: Gram Positive Cocci in Clusters    Growth in anaerobic bottle: Gram Positive Cocci in Clusters  Preliminary Report (30 Jun 2025 12:29):    Growth in aerobic bottle: Gram Positive Cocci in Clusters    Growth in anaerobic bottle: Gram Positive Cocci in Clusters    Culture - Blood (collected 29 Jun 2025 05:49)  Source: Blood None  Gram Stain (30 Jun 2025 12:29):    Growth in aerobic bottle: Gram Positive Cocci in Clusters    Growth in anaerobic bottle: Gram Positive Cocci in Clusters  Preliminary Report (30 Jun 2025 10:03):    Growth in aerobic bottle: Gram Positive Cocci in Clusters                                                    -------------------------------------------------------------  RADIOLOGY:  CXR      CT  CT Chest w/ IV Cont:   ACC: [id number] EXAM:  CT ABDOMEN AND PELVIS IC   ORDERED BY: TAL ARRIAZA     ACC: [id number] EXAM:  CT CHEST IC   ORDERED BY: TAL ARRIAZA     PROCEDURE DATE:  06/27/2025          INTERPRETATION:  CLINICAL STATEMENT: Fall  altered mental status. History   of pancreatic cancer.    TECHNIQUE: Contiguous axial CT images were obtained from the thoracic   inlet to the pubic symphysis following administration of intravenous   contrast.  95 cc administered of IOmnipaque 350 (accession [id number]) (5   cc discarded).  Oral contrast was not administered.  Reformatted images   in the coronal and sagittal planes, as well as MIP reconstructed images,   were acquired.      COMPARISON CT: None.    OTHER STUDIES USED FOR CORRELATION: None.      FINDINGS:    CHEST:    LUNGS/PLEURA/AIRWAYS: Small bilateral pleural effusions and bibasilar   atelectasis. Patchy consolidative opacities in the right upper and lower   lobe with scattered adjacent groundglass opacities. Few scattered solid   pulmonarynodules involving all lobes. For example, a left upper lobe   solid nodule measures 6 mm (303/48)..    MEDIASTINUM/THORACIC NODES: No lymphadenopathy. Atrophic thyroid gland.   Mildly distended fluid-filled esophagus HEART/GREAT VESSELS: Normal heart  size. No pericardial effusion..      ABDOMEN/PELVIS:    HEPATOBILIARY: Post cholecystectomy. Unremarkable liver.     SPLEEN: Unremarkable.    PANCREAS: Complex cystic structure in the region of the pancreatic   body/tail likely reflects a dilated main pancreatic duct measuring up to   1.9 cm with diffuse parenchymal atrophy and abrupt transition at the   level of the pancreatic head. A discrete pancreatic head mass not   visualized.Suggestion of mild soft tissue density surrounding the celiac   artery and the right hepatic artery (303/19)    ADRENAL GLANDS: Unremarkable.    KIDNEYS: Symmetric renal enhancement bilaterally. No hydronephrosis.   Subcentimeter hypodensities in both kidneys, too small to characterize.    ABDOMINOPELVIC NODES: No lymphadenopathy.    PELVIC ORGANS: Unremarkable.    PERITONEUM/MESENTERY/BOWEL: Gastric postsurgical changes. No bowel   obstruction obstruction or pneumoperitoneum. Diffuse mesenteric edema   noted. Moderate diffuse colonic stool burden.    BONES/SOFT TISSUES: No acute osseous abnormality. Degenerative changes of   the spine noted. Bilateral breast implants. Soft tissue anasarca.    OTHER: Left chest wall Mediport. Atherosclerotic vascular calcifications.   Left hepatic artery arises directly from the celiac axis.      IMPRESSION:  1.  No CT evidence of an acute traumatic intrathoracic or abdominopelvic   pathology.  2.  Patchy consolidative and nodular airspace opacities in the right   upper and lower lobes, suspicious for multifocal pneumonia in the   appropriate clinical setting. Follow-up is recommended.  3.  Few scattered bilateral solid pulmonary nodules. Metastatic disease   is a possibility. Follow-up per oncologic protocol.  4.  Diffuse pancreatic body/tail atrophy and ductal dilation with abrupt   transition at the level of the pancreatic head; suggestion of soft tissue   density surrounding the celiac and right hepatic artery, possibly tumor   encasement. Correlation with any available outside imaging is recommended.    --- End of Report ---            YO WILSON MD; Attending Radiologist  This document has been electronically signed. Jun 27 2025  1:59PM (06-27-25 @ 13:24)  CT Abdomen and Pelvis w/ IV Cont:   ACC: [id number] EXAM:  CT ABDOMEN AND PELVIS IC   ORDERED BY: TAL ARRIAZA     ACC: [id number] EXAM:  CT CHEST IC   ORDERED BY: TAL ARRIAZA     PROCEDURE DATE:  06/27/2025          INTERPRETATION:  CLINICAL STATEMENT: Fall  altered mental status. History   of pancreatic cancer.    TECHNIQUE: Contiguous axial CT images were obtained from the thoracic   inlet to the pubic symphysis following administration of intravenous   contrast.  95 cc administered of IOmnipaque 350 (accession [id number]) (5   cc discarded).  Oral contrast was not administered.  Reformatted images   in the coronal and sagittal planes, as well as MIP reconstructed images,   were acquired.      COMPARISON CT: None.    OTHER STUDIES USED FOR CORRELATION: None.      FINDINGS:    CHEST:    LUNGS/PLEURA/AIRWAYS: Small bilateral pleural effusions and bibasilar   atelectasis. Patchy consolidative opacities in the right upper and lower   lobe with scattered adjacent groundglass opacities. Few scattered solid   pulmonarynodules involving all lobes. For example, a left upper lobe   solid nodule measures 6 mm (303/48)..    MEDIASTINUM/THORACIC NODES: No lymphadenopathy. Atrophic thyroid gland.   Mildly distended fluid-filled esophagus HEART/GREAT VESSELS: Normal heart  size. No pericardial effusion..      ABDOMEN/PELVIS:    HEPATOBILIARY: Post cholecystectomy. Unremarkable liver.     SPLEEN: Unremarkable.    PANCREAS: Complex cystic structure in the region of the pancreatic   body/tail likely reflects a dilated main pancreatic duct measuring up to   1.9 cm with diffuse parenchymal atrophy and abrupt transition at the   level of the pancreatic head. A discrete pancreatic head mass not   visualized.Suggestion of mild soft tissue density surrounding the celiac   artery and the right hepatic artery (303/19)    ADRENAL GLANDS: Unremarkable.    KIDNEYS: Symmetric renal enhancement bilaterally. No hydronephrosis.   Subcentimeter hypodensities in both kidneys, too small to characterize.    ABDOMINOPELVIC NODES: No lymphadenopathy.    PELVIC ORGANS: Unremarkable.    PERITONEUM/MESENTERY/BOWEL: Gastric postsurgical changes. No bowel   obstruction obstruction or pneumoperitoneum. Diffuse mesenteric edema   noted. Moderate diffuse colonic stool burden.    BONES/SOFT TISSUES: No acute osseous abnormality. Degenerative changes of   the spine noted. Bilateral breast implants. Soft tissue anasarca.    OTHER: Left chest wall Mediport. Atherosclerotic vascular calcifications.   Left hepatic artery arises directly from the celiac axis.      IMPRESSION:  1.  No CT evidence of an acute traumatic intrathoracic or abdominopelvic   pathology.  2.  Patchy consolidative and nodular airspace opacities in the right   upper and lower lobes, suspicious for multifocal pneumonia in the   appropriate clinical setting. Follow-up is recommended.  3.  Few scattered bilateral solid pulmonary nodules. Metastatic disease   is a possibility. Follow-up per oncologic protocol.  4.  Diffuse pancreatic body/tail atrophy and ductal dilation with abrupt   transition at the level of the pancreatic head; suggestion of soft tissue   density surrounding the celiac and right hepatic artery, possibly tumor   encasement. Correlation with any available outside imaging is recommended.    --- End of Report ---            YO WILSON MD; Attending Radiologist  This document has been electronically signed. Jun 27 2025  1:59PM (06-27-25 @ 13:24)                                            --------------------------------------------------------------    PHYSICAL EXAM:  GENERAL:  lying in bed, cachectic and ill- appearing   CHEST/LUNG: decreased breath sounds  HEART: IRRegular   ABDOMEN: Soft, nontender, nondistended  EXTREMITIES:  mild LE edema   NERVOUS SYSTEM:  A&Ox0

## 2025-06-30 NOTE — PROGRESS NOTE ADULT - SUBJECTIVE AND OBJECTIVE BOX
Patient is a 74y old  Female who presents with a chief complaint of weakness (29 Jun 2025 10:59)        Over Night Events: No acute events noted, on NC       ROS:     All ROS are negative except HPI         PHYSICAL EXAM    ICU Vital Signs Last 24 Hrs  T(C): 36 (30 Jun 2025 06:33), Max: 36.1 (29 Jun 2025 23:15)  T(F): 96.8 (30 Jun 2025 06:33), Max: 96.9 (29 Jun 2025 23:15)  HR: 86 (30 Jun 2025 06:33) (86 - 151)  BP: 114/59 (30 Jun 2025 06:33) (105/57 - 150/65)  BP(mean): 80 (30 Jun 2025 06:33) (76 - 93)  ABP: --  ABP(mean): --  RR: 19 (30 Jun 2025 06:33) (19 - 23)  SpO2: 96% (30 Jun 2025 06:33) (93% - 99%)    O2 Parameters below as of 30 Jun 2025 04:35  Patient On (Oxygen Delivery Method): nasal cannula  O2 Flow (L/min): 3          CONSTITUTIONAL:  NAD    ENT:   Airway patent,   Mouth with normal mucosa.     EYES:   Pupils equal,   Round and reactive to light.    CARDIAC:   Normal rate,   Regular rhythm.      Vascular:  Normal systolic impulse  No Carotid bruits    RESPIRATORY:   No wheezing  Bilateral BS    GASTROINTESTINAL:  Abdomen soft,   Non-tender,     MUSCULOSKELETAL:   No clubbing, cyanosis    NEUROLOGICAL:   Alert and oriented x0     SKIN:   Skin normal color for race,   Warm and dry and intact.       06-29-25 @ 07:01  -  06-30-25 @ 07:00  --------------------------------------------------------  IN:    Diltiazem: 50 mL    IV PiggyBack: 150 mL    Lactated Ringers: 1875 mL  Total IN: 2075 mL    OUT:    Indwelling Catheter - Urethral (mL): 670 mL    Stool (mL): 0 mL    Voided (mL): 0 mL  Total OUT: 670 mL    Total NET: 1405 mL          LABS:                            8.7    8.78  )-----------( 29 ( 30 Jun 2025 05:46 )             26.7                                               06-30    137  |  101  |  51[H]  ----------------------------<  185[H]  3.6   |  20  |  1.0    Ca    9.4      30 Jun 2025 05:46  Phos  2.6     06-29  Mg     2.4     06-30    TPro  4.4[L]  /  Alb  1.8[L]  /  TBili  0.6  /  DBili  x   /  AST  24  /  ALT  15  /  AlkPhos  132[H]  06-30      PT/INR - ( 28 Jun 2025 16:04 )   PT: 16.00 sec;   INR: 1.35 ratio         PTT - ( 28 Jun 2025 16:04 )  PTT:38.3 sec                                       Urinalysis Basic - ( 30 Jun 2025 05:46 )    Color: x / Appearance: x / SG: x / pH: x  Gluc: 185 mg/dL / Ketone: x  / Bili: x / Urobili: x   Blood: x / Protein: x / Nitrite: x   Leuk Esterase: x / RBC: x / WBC x   Sq Epi: x / Non Sq Epi: x / Bacteria: x                                                  LIVER FUNCTIONS - ( 30 Jun 2025 05:46 )  Alb: 1.8 g/dL / Pro: 4.4 g/dL / ALK PHOS: 132 U/L / ALT: 15 U/L / AST: 24 U/L / GGT: x                                                  Urinalysis with Rflx Culture (collected 27 Jun 2025 10:20)    Culture - Urine (collected 27 Jun 2025 10:20)  Source: Clean Catch None  Preliminary Report (29 Jun 2025 23:38):    >100,000 CFU/ml Escherichia coli    Culture - Blood (collected 27 Jun 2025 09:30)  Source: Blood Blood-Peripheral  Gram Stain (28 Jun 2025 03:38):    Growth in aerobic bottle: Gram Positive Cocci in Clusters    Growth in anaerobic bottle: Gram Positive Cocci in Clusters  Final Report (29 Jun 2025 22:47):    Growth in aerobic and anaerobic bottles: Staphylococcus aureus    See previous culture [id number]    Culture - Blood (collected 27 Jun 2025 09:30)  Source: Blood Blood-Peripheral  Gram Stain (28 Jun 2025 04:38):    Growth in aerobic bottle: Gram Positive Cocci in Clusters    Growth in anaerobic bottle: Gram Positive Cocci in Clusters  Final Report (28 Jun 2025 22:32):    Growth in aerobic and anaerobic bottles: Staphylococcus aureus    Direct identification is available within approximately 3-5    hours either by Blood Panel Multiplexed PCR or Direct    MALDI-TOF. Details: [email or web address]  Organism: Blood Culture PCR  Staphylococcus aureus (29 Jun 2025 16:35)  Organism: Staphylococcus aureus (29 Jun 2025 16:35)  Organism: Blood Culture PCR (29 Jun 2025 16:35)                                                                                           MEDICATIONS  (STANDING):  ceFAZolin   IVPB 2000 milliGRAM(s) IV Intermittent every 8 hours  chlorhexidine 2% Cloths 1 Application(s) Topical <User Schedule>  dextrose 5%. 1000 milliLiter(s) (50 mL/Hr) IV Continuous <Continuous>  dextrose 5%. 1000 milliLiter(s) (100 mL/Hr) IV Continuous <Continuous>  dextrose 50% Injectable 25 Gram(s) IV Push once  dextrose 50% Injectable 12.5 Gram(s) IV Push once  dextrose 50% Injectable 25 Gram(s) IV Push once  diltiazem Infusion 5 mG/Hr (5 mL/Hr) IV Continuous <Continuous>  glucagon  Injectable 1 milliGRAM(s) IntraMuscular once  insulin lispro (ADMELOG) corrective regimen sliding scale   SubCutaneous three times a day before meals  lactated ringers. 1000 milliLiter(s) (75 mL/Hr) IV Continuous <Continuous>  pancrelipase  (CREON 12,000 Lipase Units) 2 Capsule(s) Oral three times a day with meals  pantoprazole    Tablet 40 milliGRAM(s) Oral before breakfast  sertraline 50 milliGRAM(s) Oral daily  tiZANidine 4 milliGRAM(s) Oral at bedtime  vitamin A & D Ointment 1 Application(s) Topical two times a day    MEDICATIONS  (PRN):  acetaminophen     Tablet .. 650 milliGRAM(s) Oral every 6 hours PRN Temp greater or equal to 38C (100.4F), Mild Pain (1 - 3)  dextrose Oral Gel 15 Gram(s) Oral once PRN Blood Glucose LESS THAN 70 milliGRAM(s)/deciliter  metoprolol tartrate Injectable 5 milliGRAM(s) IV Push every 6 hours PRN heart rate above 110  morphine  - Injectable 2 milliGRAM(s) IV Push every 4 hours PRN Moderate Pain (4 - 6)      New X-rays reviewed:                                                                                  ECHO

## 2025-06-30 NOTE — CONSULT NOTE ADULT - ASSESSMENT
80-year-old female w/PMHX: Pancreatic ca, s/p chemo,HTN, HLD, DVT - admitted for weakness; now with MSSA bacteremia    Surgery consult requested for removal of left chest port due to MSSA bacteremia, as per ID    Pt seen by Dr. Montalvo, spoke with patient's :     -left chest port was placed approximately 1 month ago at [facility] [place]  -will remove port at bedside later today once additional IV access obtained  (ICU resident made aware)   80-year-old female w/PMHX: Pancreatic ca, s/p chemo,HTN, HLD, DVT - admitted for weakness; now with MSSA bacteremia    Surgery consult requested for removal of left chest port due to MSSA bacteremia, as per ID    Pt seen by Dr. Montalvo, spoke with patient's :     -left chest port was placed approximately 1 month ago at [facility] [place]  -will remove port at bedside once secure IV access obtained  (ICU resident made aware)

## 2025-06-30 NOTE — CONSULT NOTE ADULT - ASSESSMENT
High risk for pressure injury development or progression       Wound #1  Type and location :  Deep tissue pressure injury to sacrococcygeal   Size: ~3x3  Tissue Description : Intact dark red skin tissue   No odor, erythema, increased warmth, tenderness, induration, fluctuance, nor crepitus   Wound Exudate : None    Wound Edge: Intact   Periwound Condition : Macerated           Wound and skin care recs.    Clean wound with soap and water, pat dry apply triad hydrophilic dressing - monitor fro progression   Pressure  injury  preventive  measures  Skin  and incontinence care   Assess wound and inform primary provider of any changes   Case discussed with primary Rn  Wound/ ostomy specialist  to f/u as needed     Offloading: [x ] Frequent position changes [ ] Devices/Equipment  Cleansing: [ ] Saline [ x] Soap/Water [ ] Other: ______  Topicals: [x ] Barrier Cream [ ] Antimicrobial [ ] Enzymatic Wound Debridement [] Moist  wound Healing    Dressings: [ ] Dry, sterile [ ] Allevyn  Foam [ ] Absorbant Pads [ ] Collagenase    Other Recs.   Per Primary team   Total time for bedside assessment  , review of medical records  and  discussion of plan of care with primary team greater than 35 min                             High risk for pressure injury development or progression                            B/L extremity diffused skin tears with ecchymosis       Wound #1  Type and location :  Deep tissue pressure injury to sacrococcygeal (present on admission )  Size: ~6x6  Tissue Description : Intact dark red skin tissue   No odor, erythema, increased warmth, tenderness, induration, fluctuance, nor crepitus   Wound Exudate : None    Wound Edge: Intact   Periwound Condition : Macerated     Wound #2  Type and location :  Deep tissue pressure injury to L heel ( present on admission )    Size: ~2x2  Tissue Description : Intact dark red skin tissue   No odor, erythema, increased warmth, tenderness, induration, fluctuance, nor crepitus   Wound Exudate : None    Wound Edge: Intact   Periwound Condition : Macerated                 Wound and skin care recs.    Clean wound with soap and water, pat dry apply triad hydrophilic dressing - monitor fro progression   Apply xeroform with Kerlix dressing to skin tear   Pressure  injury  preventive  measures  Skin  and incontinence care   Assess wound and inform primary provider of any changes   Case discussed with primary Rn  Wound/ ostomy specialist  to f/u as needed     Offloading: [x ] Frequent position changes [ ] Devices/Equipment  Cleansing: [ ] Saline [ x] Soap/Water [ ] Other: ______  Topicals: [x ] Barrier Cream [ ] Antimicrobial [ ] Enzymatic Wound Debridement [] Moist  wound Healing    Dressings: [ ] Dry, sterile [ ] Allevyn  Foam [ ] Absorbant Pads [ ] Collagenase    Other Recs.   Per Primary team   Total time for bedside assessment  , review of medical records  and  discussion of plan of care with primary team greater than 35 min

## 2025-06-30 NOTE — PROGRESS NOTE ADULT - ATTENDING COMMENTS
Attending Statement: I have personally performed a face to face diagnostic evaluation on this patient. The patient is suffering from:  MSSA bacteremia- Unclear Source  Multifocal Pneumonia   Thrombocytopenia   Metabolic encephalopathy   Fall with Sacral bruising  CKD 3  Transaminitis  Pancreatic CA on chemo via left sided Chest wall port  Pulmonary Nodules  I have made amendments to the documentation where necessary. I have personally seen and examined this patient.  I have fully participated in the care of this patient.  I have reviewed all pertinent clinical information, including history, physical exam, plan and note.

## 2025-06-30 NOTE — CONSULT NOTE ADULT - SUBJECTIVE AND OBJECTIVE BOX
LINDA ONEAL [id number]  74y Female  3d    HPI:   80-year-old female with past medical history of hypertension hyperlipidemia DVT on Eliquis recently being treated for pancreatic cancer on chemotherapy with chest port brought to ed for evaluation of the weakness. Hx taken from the patient  at bedside. States that for the past few days, patient has been very weak, dehydrated, not eating and drinking well, unable to walk and unable to take care of her self. Also reported that patient has been more sleepy recently and had a fall a few back.  said she normally has some dementia however the last 3 days, Patient was recently becoming more demented prior to the fall.  Patient complaining of diffuse body pain afterwards  (27 Jun 2025 15:17)      PAST MEDICAL & SURGICAL HISTORY:        MEDICATIONS  (STANDING):  ceFAZolin   IVPB 2000 milliGRAM(s) IV Intermittent every 8 hours  chlorhexidine 2% Cloths 1 Application(s) Topical <User Schedule>  dextrose 5%. 1000 milliLiter(s) (50 mL/Hr) IV Continuous <Continuous>  dextrose 5%. 1000 milliLiter(s) (100 mL/Hr) IV Continuous <Continuous>  dextrose 50% Injectable 25 Gram(s) IV Push once  dextrose 50% Injectable 12.5 Gram(s) IV Push once  dextrose 50% Injectable 25 Gram(s) IV Push once  diltiazem Infusion 5 mG/Hr (5 mL/Hr) IV Continuous <Continuous>  glucagon  Injectable 1 milliGRAM(s) IntraMuscular once  insulin lispro (ADMELOG) corrective regimen sliding scale   SubCutaneous three times a day before meals  lactated ringers. 1000 milliLiter(s) (75 mL/Hr) IV Continuous <Continuous>  pancrelipase  (CREON 12,000 Lipase Units) 2 Capsule(s) Oral three times a day with meals  pantoprazole    Tablet 40 milliGRAM(s) Oral before breakfast  sertraline 50 milliGRAM(s) Oral daily  tiZANidine 4 milliGRAM(s) Oral at bedtime  vitamin A & D Ointment 1 Application(s) Topical two times a day    MEDICATIONS  (PRN):  acetaminophen     Tablet .. 650 milliGRAM(s) Oral every 6 hours PRN Temp greater or equal to 38C (100.4F), Mild Pain (1 - 3)  dextrose Oral Gel 15 Gram(s) Oral once PRN Blood Glucose LESS THAN 70 milliGRAM(s)/deciliter  metoprolol tartrate Injectable 5 milliGRAM(s) IV Push every 6 hours PRN heart rate above 110  morphine  - Injectable 2 milliGRAM(s) IV Push every 4 hours PRN Moderate Pain (4 - 6)      Allergies    No Known Allergies    Intolerances        REVIEW OF SYSTEMS    [ ] A ten-point review of systems was otherwise negative except as noted.  [ ] Due to altered mental status/intubation, subjective information were not able to be obtained from the patient. History was obtained, to the extent possible, from review of the chart and collateral sources of information.      Vital Signs Last 24 Hrs  T(C): 36.3 (30 Jun 2025 07:20), Max: 36.3 (30 Jun 2025 07:20)  T(F): 97.3 (30 Jun 2025 07:20), Max: 97.3 (30 Jun 2025 07:20)  HR: 94 (30 Jun 2025 07:20) (86 - 151)  BP: 118/57 (30 Jun 2025 07:20) (105/57 - 150/65)  BP(mean): 80 (30 Jun 2025 07:20) (76 - 93)  RR: 18 (30 Jun 2025 07:20) (18 - 23)  SpO2: 95% (30 Jun 2025 07:20) (93% - 99%)    Parameters below as of 30 Jun 2025 07:20  Patient On (Oxygen Delivery Method): nasal cannula        PHYSICAL EXAM:  GENERAL: NAD, well-appearing  CHEST/LUNG: Clear to auscultation bilaterally  HEART: Regular rate and rhythm  ABDOMEN: Soft, Nontender, Nondistended;   EXTREMITIES:  No clubbing, cyanosis, or edema      LABS:  Labs:  CAPILLARY BLOOD GLUCOSE      POCT Blood Glucose.: 181 mg/dL (30 Jun 2025 12:43)  POCT Blood Glucose.: 164 mg/dL (30 Jun 2025 11:23)  POCT Blood Glucose.: 198 mg/dL (30 Jun 2025 07:31)  POCT Blood Glucose.: 173 mg/dL (29 Jun 2025 22:15)  POCT Blood Glucose.: 178 mg/dL (29 Jun 2025 16:15)                          8.7    8.78  )-----------( 29       ( 30 Jun 2025 05:46 )             26.7       Auto Neutrophil %: 82.4 % (06-30-25 @ 05:46)  Auto Immature Granulocyte %: 1.9 % (06-30-25 @ 05:46)    06-30    137  |  101  |  51[H]  ----------------------------<  185[H]  3.6   |  20  |  1.0      Calcium: 9.4 mg/dL (06-30-25 @ 05:46)      LFTs:             4.4  | 0.6  | 24       ------------------[132     ( 30 Jun 2025 05:46 )  1.8  | x    | 15          Lipase:x      Amylase:x         Lactate, Blood: 2.4 mmol/L (06-29-25 @ 15:44)  Lactate, Blood: 2.3 mmol/L (06-29-25 @ 05:49)  Lactate, Blood: 2.2 mmol/L (06-28-25 @ 16:04)  Lactate, Blood: 2.2 mmol/L (06-28-25 @ 11:57)  Lactate, Blood: 3.2 mmol/L (06-28-25 @ 05:52)      Coags:     16.00  ----< 1.35    ( 28 Jun 2025 16:04 )     38.3                Urinalysis Basic - ( 30 Jun 2025 05:46 )    Color: x / Appearance: x / SG: x / pH: x  Gluc: 185 mg/dL / Ketone: x  / Bili: x / Urobili: x   Blood: x / Protein: x / Nitrite: x   Leuk Esterase: x / RBC: x / WBC x   Sq Epi: x / Non Sq Epi: x / Bacteria: x        Culture - Blood (collected 29 Jun 2025 05:49)  Source: Blood None  Gram Stain (30 Jun 2025 12:29):    Growth in aerobic bottle: Gram Positive Cocci in Clusters    Growth in anaerobic bottle: Gram Positive Cocci in Clusters  Preliminary Report (30 Jun 2025 12:29):    Growth in aerobic bottle: Gram Positive Cocci in Clusters    Growth in anaerobic bottle: Gram Positive Cocci in Clusters    Culture - Blood (collected 29 Jun 2025 05:49)  Source: Blood None  Gram Stain (30 Jun 2025 12:29):    Growth in aerobic bottle: Gram Positive Cocci in Clusters    Growth in anaerobic bottle: Gram Positive Cocci in Clusters  Preliminary Report (30 Jun 2025 10:03):    Growth in aerobic bottle: Gram Positive Cocci in Clusters          RADIOLOGY & ADDITIONAL STUDIES:   LINDA ONEAL [id number]  74y Female  3d    HPI:   80-year-old female with past medical history of hypertension hyperlipidemia DVT on Eliquis recently being treated for pancreatic cancer on chemotherapy with chest port brought to ed for evaluation of the weakness. Hx taken from the patient  at bedside. States that for the past few days, patient has been very weak, dehydrated, not eating and drinking well, unable to walk and unable to take care of her self. Also reported that patient has been more sleepy recently and had a fall.  said she normally has some dementia however the last 3 days, Patient was recently becoming more demented prior to the fall.  Patient complaining of diffuse body pain afterwards  (27 Jun 2025 15:17)      PAST MEDICAL & SURGICAL HISTORY:        MEDICATIONS  (STANDING):  ceFAZolin   IVPB 2000 milliGRAM(s) IV Intermittent every 8 hours  chlorhexidine 2% Cloths 1 Application(s) Topical <User Schedule>  dextrose 5%. 1000 milliLiter(s) (50 mL/Hr) IV Continuous <Continuous>  dextrose 5%. 1000 milliLiter(s) (100 mL/Hr) IV Continuous <Continuous>  dextrose 50% Injectable 25 Gram(s) IV Push once  dextrose 50% Injectable 12.5 Gram(s) IV Push once  dextrose 50% Injectable 25 Gram(s) IV Push once  diltiazem Infusion 5 mG/Hr (5 mL/Hr) IV Continuous <Continuous>  glucagon  Injectable 1 milliGRAM(s) IntraMuscular once  insulin lispro (ADMELOG) corrective regimen sliding scale   SubCutaneous three times a day before meals  lactated ringers. 1000 milliLiter(s) (75 mL/Hr) IV Continuous <Continuous>  pancrelipase  (CREON 12,000 Lipase Units) 2 Capsule(s) Oral three times a day with meals  pantoprazole    Tablet 40 milliGRAM(s) Oral before breakfast  sertraline 50 milliGRAM(s) Oral daily  tiZANidine 4 milliGRAM(s) Oral at bedtime  vitamin A & D Ointment 1 Application(s) Topical two times a day    MEDICATIONS  (PRN):  acetaminophen     Tablet .. 650 milliGRAM(s) Oral every 6 hours PRN Temp greater or equal to 38C (100.4F), Mild Pain (1 - 3)  dextrose Oral Gel 15 Gram(s) Oral once PRN Blood Glucose LESS THAN 70 milliGRAM(s)/deciliter  metoprolol tartrate Injectable 5 milliGRAM(s) IV Push every 6 hours PRN heart rate above 110  morphine  - Injectable 2 milliGRAM(s) IV Push every 4 hours PRN Moderate Pain (4 - 6)      Allergies    No Known Allergies    Intolerances        REVIEW OF SYSTEMS    [ ] A ten-point review of systems was otherwise negative except as noted.  [ ] Due to altered mental status/intubation, subjective information were not able to be obtained from the patient. History was obtained, to the extent possible, from review of the chart and collateral sources of information.      Vital Signs Last 24 Hrs  T(C): 36.3 (30 Jun 2025 07:20), Max: 36.3 (30 Jun 2025 07:20)  T(F): 97.3 (30 Jun 2025 07:20), Max: 97.3 (30 Jun 2025 07:20)  HR: 94 (30 Jun 2025 07:20) (86 - 151)  BP: 118/57 (30 Jun 2025 07:20) (105/57 - 150/65)  BP(mean): 80 (30 Jun 2025 07:20) (76 - 93)  RR: 18 (30 Jun 2025 07:20) (18 - 23)  SpO2: 95% (30 Jun 2025 07:20) (93% - 99%)    Parameters below as of 30 Jun 2025 07:20  Patient On (Oxygen Delivery Method): nasal cannula        PHYSICAL EXAM:  GENERAL: NAD, well-appearing  CHEST/LUNG: Clear to auscultation bilaterally  HEART: Regular rate and rhythm  ABDOMEN: Soft, Nontender, Nondistended;   EXTREMITIES:  No clubbing, cyanosis, or edema      LABS:  Labs:  CAPILLARY BLOOD GLUCOSE      POCT Blood Glucose.: 181 mg/dL (30 Jun 2025 12:43)  POCT Blood Glucose.: 164 mg/dL (30 Jun 2025 11:23)  POCT Blood Glucose.: 198 mg/dL (30 Jun 2025 07:31)  POCT Blood Glucose.: 173 mg/dL (29 Jun 2025 22:15)  POCT Blood Glucose.: 178 mg/dL (29 Jun 2025 16:15)                          8.7    8.78  )-----------( 29       ( 30 Jun 2025 05:46 )             26.7       Auto Neutrophil %: 82.4 % (06-30-25 @ 05:46)  Auto Immature Granulocyte %: 1.9 % (06-30-25 @ 05:46)    06-30    137  |  101  |  51[H]  ----------------------------<  185[H]  3.6   |  20  |  1.0      Calcium: 9.4 mg/dL (06-30-25 @ 05:46)      LFTs:             4.4  | 0.6  | 24       ------------------[132     ( 30 Jun 2025 05:46 )  1.8  | x    | 15          Lipase:x      Amylase:x         Lactate, Blood: 2.4 mmol/L (06-29-25 @ 15:44)  Lactate, Blood: 2.3 mmol/L (06-29-25 @ 05:49)  Lactate, Blood: 2.2 mmol/L (06-28-25 @ 16:04)  Lactate, Blood: 2.2 mmol/L (06-28-25 @ 11:57)  Lactate, Blood: 3.2 mmol/L (06-28-25 @ 05:52)      Coags:     16.00  ----< 1.35    ( 28 Jun 2025 16:04 )     38.3                Urinalysis Basic - ( 30 Jun 2025 05:46 )    Color: x / Appearance: x / SG: x / pH: x  Gluc: 185 mg/dL / Ketone: x  / Bili: x / Urobili: x   Blood: x / Protein: x / Nitrite: x   Leuk Esterase: x / RBC: x / WBC x   Sq Epi: x / Non Sq Epi: x / Bacteria: x        Culture - Blood (collected 29 Jun 2025 05:49)  Source: Blood None  Gram Stain (30 Jun 2025 12:29):    Growth in aerobic bottle: Gram Positive Cocci in Clusters    Growth in anaerobic bottle: Gram Positive Cocci in Clusters  Preliminary Report (30 Jun 2025 12:29):    Growth in aerobic bottle: Gram Positive Cocci in Clusters    Growth in anaerobic bottle: Gram Positive Cocci in Clusters    Culture - Blood (collected 29 Jun 2025 05:49)  Source: Blood None  Gram Stain (30 Jun 2025 12:29):    Growth in aerobic bottle: Gram Positive Cocci in Clusters    Growth in anaerobic bottle: Gram Positive Cocci in Clusters  Preliminary Report (30 Jun 2025 10:03):    Growth in aerobic bottle: Gram Positive Cocci in Clusters          RADIOLOGY & ADDITIONAL STUDIES:

## 2025-06-30 NOTE — CONSULT NOTE ADULT - SUBJECTIVE AND OBJECTIVE BOX
Hematology Consult Note    HPI:   80-year-old female with past medical history of hypertension hyperlipidemia DVT on Eliquis recently being treated for pancreatic cancer on chemotherapy with chest port brought to ed for evaluation of the weakness. Hx taken from the patient  at bedside. States that for the past few days, patient has been very weak, dehydrated, not eating and drinking well, unable to walk and unable to take care of her self. Also reported that patient has been more sleepy recently and had a fall a few back.  said she normally has some dementia however the last 3 days, Patient was recently becoming more demented prior to the fall.  Patient complaining of diffuse body pain afterwards  (27 Jun 2025 15:17)      Allergies    No Known Allergies    Intolerances        MEDICATIONS  (STANDING):  ceFAZolin   IVPB 2000 milliGRAM(s) IV Intermittent every 8 hours  chlorhexidine 2% Cloths 1 Application(s) Topical <User Schedule>  dextrose 5%. 1000 milliLiter(s) (50 mL/Hr) IV Continuous <Continuous>  dextrose 5%. 1000 milliLiter(s) (100 mL/Hr) IV Continuous <Continuous>  dextrose 50% Injectable 25 Gram(s) IV Push once  dextrose 50% Injectable 12.5 Gram(s) IV Push once  dextrose 50% Injectable 25 Gram(s) IV Push once  diltiazem Infusion 5 mG/Hr (5 mL/Hr) IV Continuous <Continuous>  glucagon  Injectable 1 milliGRAM(s) IntraMuscular once  insulin lispro (ADMELOG) corrective regimen sliding scale   SubCutaneous three times a day before meals  lactated ringers. 1000 milliLiter(s) (75 mL/Hr) IV Continuous <Continuous>  pancrelipase  (CREON 12,000 Lipase Units) 2 Capsule(s) Oral three times a day with meals  pantoprazole    Tablet 40 milliGRAM(s) Oral before breakfast  sertraline 50 milliGRAM(s) Oral daily  tiZANidine 4 milliGRAM(s) Oral at bedtime  vitamin A & D Ointment 1 Application(s) Topical two times a day    MEDICATIONS  (PRN):  acetaminophen     Tablet .. 650 milliGRAM(s) Oral every 6 hours PRN Temp greater or equal to 38C (100.4F), Mild Pain (1 - 3)  dextrose Oral Gel 15 Gram(s) Oral once PRN Blood Glucose LESS THAN 70 milliGRAM(s)/deciliter  metoprolol tartrate Injectable 5 milliGRAM(s) IV Push every 6 hours PRN heart rate above 110  morphine  - Injectable 2 milliGRAM(s) IV Push every 4 hours PRN Moderate Pain (4 - 6)      PAST MEDICAL & SURGICAL HISTORY:      FAMILY HISTORY:      SOCIAL HISTORY: No EtOH, no tobacco    REVIEW OF SYSTEMS:    CONSTITUTIONAL: No weakness, fevers or chills  EYES/ENT: No visual changes;  No vertigo or throat pain   NECK: No pain or stiffness  RESPIRATORY: No cough, wheezing, hemoptysis; No shortness of breath  CARDIOVASCULAR: No chest pain or palpitations  GASTROINTESTINAL: No abdominal or epigastric pain. No nausea, vomiting, or hematemesis; No diarrhea or constipation. No melena or hematochezia.  GENITOURINARY: No dysuria, frequency or hematuria  NEUROLOGICAL: No numbness or weakness  SKIN: No itching, burning, rashes, or lesions   All other review of systems is negative unless indicated above.        T(F): 97.3 (06-30-25 @ 07:20), Max: 97.3 (06-30-25 @ 07:20)  HR: 94 (06-30-25 @ 07:20)  BP: 118/57 (06-30-25 @ 07:20)  RR: 18 (06-30-25 @ 07:20)  SpO2: 95% (06-30-25 @ 07:20)  Wt(kg): --    GENERAL: NAD, well-developed  HEAD:  Atraumatic, Normocephalic  EYES: EOMI, PERRLA, conjunctiva and sclera clear  NECK: Supple, No JVD  CHEST/LUNG: Clear to auscultation bilaterally; No wheeze  HEART: Regular rate and rhythm; No murmurs, rubs, or gallops  ABDOMEN: Soft, Nontender, Nondistended; Bowel sounds present  EXTREMITIES:  2+ Peripheral Pulses, No clubbing, cyanosis, or edema  NEUROLOGY: non-focal  SKIN: No rashes or lesions                          8.7    8.78  )-----------( 29       ( 30 Jun 2025 05:46 )             26.7       06-30    137  |  101  |  51[H]  ----------------------------<  185[H]  3.6   |  20  |  1.0    Ca    9.4      30 Jun 2025 05:46  Phos  2.6     06-29  Mg     2.4     06-30    TPro  4.4[L]  /  Alb  1.8[L]  /  TBili  0.6  /  DBili  x   /  AST  24  /  ALT  15  /  AlkPhos  132[H]  06-30      Magnesium: 2.4 mg/dL (06-30 @ 05:46)       Hematology Consult Note    HPI:   80-year-old female with past medical history of hypertension hyperlipidemia DVT on Eliquis recently being treated for pancreatic cancer on chemotherapy with chest port brought to ed for evaluation of the weakness. Hx taken from the patient  at bedside. States that for the past few days, patient has been very weak, dehydrated, not eating and drinking well, unable to walk and unable to take care of her self. Also reported that patient has been more sleepy recently and had a fall a few back.  said she normally has some dementia however the last 3 days, Patient was recently becoming more demented prior to the fall.  Patient complaining of diffuse body pain afterwards  (27 Jun 2025 15:17)      Allergies    No Known Allergies    Intolerances        MEDICATIONS  (STANDING):  ceFAZolin   IVPB 2000 milliGRAM(s) IV Intermittent every 8 hours  chlorhexidine 2% Cloths 1 Application(s) Topical <User Schedule>  dextrose 5%. 1000 milliLiter(s) (50 mL/Hr) IV Continuous <Continuous>  dextrose 5%. 1000 milliLiter(s) (100 mL/Hr) IV Continuous <Continuous>  dextrose 50% Injectable 25 Gram(s) IV Push once  dextrose 50% Injectable 12.5 Gram(s) IV Push once  dextrose 50% Injectable 25 Gram(s) IV Push once  diltiazem Infusion 5 mG/Hr (5 mL/Hr) IV Continuous <Continuous>  glucagon  Injectable 1 milliGRAM(s) IntraMuscular once  insulin lispro (ADMELOG) corrective regimen sliding scale   SubCutaneous three times a day before meals  lactated ringers. 1000 milliLiter(s) (75 mL/Hr) IV Continuous <Continuous>  pancrelipase  (CREON 12,000 Lipase Units) 2 Capsule(s) Oral three times a day with meals  pantoprazole    Tablet 40 milliGRAM(s) Oral before breakfast  sertraline 50 milliGRAM(s) Oral daily  tiZANidine 4 milliGRAM(s) Oral at bedtime  vitamin A & D Ointment 1 Application(s) Topical two times a day    MEDICATIONS  (PRN):  acetaminophen     Tablet .. 650 milliGRAM(s) Oral every 6 hours PRN Temp greater or equal to 38C (100.4F), Mild Pain (1 - 3)  dextrose Oral Gel 15 Gram(s) Oral once PRN Blood Glucose LESS THAN 70 milliGRAM(s)/deciliter  metoprolol tartrate Injectable 5 milliGRAM(s) IV Push every 6 hours PRN heart rate above 110  morphine  - Injectable 2 milliGRAM(s) IV Push every 4 hours PRN Moderate Pain (4 - 6)      PAST MEDICAL & SURGICAL HISTORY:      FAMILY HISTORY:      SOCIAL HISTORY: No EtOH, no tobacco    REVIEW OF SYSTEMS: unable to obtain due to her condition-AMS        T(F): 97.3 (06-30-25 @ 07:20), Max: 97.3 (06-30-25 @ 07:20)  HR: 94 (06-30-25 @ 07:20)  BP: 118/57 (06-30-25 @ 07:20)  RR: 18 (06-30-25 @ 07:20)  SpO2: 95% (06-30-25 @ 07:20)  Wt(kg): --  GENERAL: sleepy, sedated, dyspneic   CHEST/LUNG: Clear to auscultation bilaterally  HEART: Regular rate and rhythm  ABDOMEN: Soft, Nontender, Nondistended;   EXTREMITIES:  No clubbing, cyanosis, or edema                      8.7    8.78  )-----------( 29       ( 30 Jun 2025 05:46 )             26.7       06-30    137  |  101  |  51[H]  ----------------------------<  185[H]  3.6   |  20  |  1.0    Ca    9.4      30 Jun 2025 05:46  Phos  2.6     06-29  Mg     2.4     06-30    TPro  4.4[L]  /  Alb  1.8[L]  /  TBili  0.6  /  DBili  x   /  AST  24  /  ALT  15  /  AlkPhos  132[H]  06-30      Magnesium: 2.4 mg/dL (06-30 @ 05:46)

## 2025-06-30 NOTE — PROGRESS NOTE ADULT - SUBJECTIVE AND OBJECTIVE BOX
LINDA ONEAL  74y, Female    LOS  3d    INTERVAL EVENTS/HPI  - No acute events overnight  - T(F): , Max: 97.3 (06-30-25 @ 07:20)  - WBC Count: 8.78 (06-30-25 @ 05:46)  WBC Count: 7.95 (06-29-25 @ 05:49)  - Creatinine: 1.0 (06-30-25 @ 05:46)  Creatinine: 1.2 (06-29-25 @ 05:49)    REVIEW OF SYSTEMS: cannot obtain further history from the patient secondary to altered mental status or sedation    Prior hospital charts reviewed [Yes]  Primary team notes reviewed [Yes]  Other consultant notes reviewed [Yes]      ANTIMICROBIALS:   ceFAZolin   IVPB 2000 every 8 hours      OTHER MEDS: MEDICATIONS  (STANDING):  acetaminophen     Tablet .. 650 every 6 hours PRN  dextrose 50% Injectable 25 once  dextrose 50% Injectable 12.5 once  dextrose 50% Injectable 25 once  dextrose Oral Gel 15 once PRN  diltiazem Infusion 5 <Continuous>  glucagon  Injectable 1 once  insulin lispro (ADMELOG) corrective regimen sliding scale  three times a day before meals  metoprolol tartrate Injectable 5 every 6 hours PRN  morphine  - Injectable 2 every 4 hours PRN  pancrelipase  (CREON 12,000 Lipase Units) 2 three times a day with meals  pantoprazole    Tablet 40 before breakfast  sertraline 50 daily  tiZANidine 4 at bedtime      Vital Signs Last 24 Hrs  T(F): 97.3 (06-30-25 @ 07:20), Max: 97.3 (06-30-25 @ 07:20)    Vital Signs Last 24 Hrs  HR: 94 (06-30-25 @ 07:20) (86 - 151)  BP: 118/57 (06-30-25 @ 07:20) (105/57 - 150/65)  RR: 18 (06-30-25 @ 07:20)  SpO2: 95% (06-30-25 @ 07:20) (93% - 99%)  Wt(kg): --    EXAM:  GENERAL: In NAD  HEAD: Left sided chest wall port scab noted.   NECK: Supple  CHEST/LUNG: Shallow breath sounds.   HEART: S1 S2  ABDOMEN: Soft, nontender  EXTREMITIES: No clubbing  NERVOUS SYSTEM: Awake. Confused  MSK: No joint erythema, swelling or pain    Labs:                        8.7    8.78  )-----------( 29 ( 30 Jun 2025 05:46 )             26.7     06-30    137  |  101  |  51[H]  ----------------------------<  185[H]  3.6   |  20  |  1.0    Ca    9.4      30 Jun 2025 05:46  Phos  2.6     06-29  Mg     2.4     06-30    TPro  4.4[L]  /  Alb  1.8[L]  /  TBili  0.6  /  DBili  x   /  AST  24  /  ALT  15  /  AlkPhos  132[H]  06-30      WBC Trend:  WBC Count: 8.78 (06-30-25 @ 05:46)  WBC Count: 7.95 (06-29-25 @ 05:49)  WBC Count: 6.90 (06-28-25 @ 11:57)  WBC Count: 7.22 (06-28-25 @ 05:52)      Creatine Trend:  Creatinine: 1.0 (06-30)  Creatinine: 1.2 (06-29)  Creatinine: 1.1 (06-28)  Creatinine: 1.0 (06-28)      Liver Biochemical Testing Trend:  Alanine Aminotransferase (ALT/SGPT): 15 (06-30)  Alanine Aminotransferase (ALT/SGPT): 37 (06-29)  Alanine Aminotransferase (ALT/SGPT): 53 *H* (06-28)  Alanine Aminotransferase (ALT/SGPT): 68 *H* (06-28)  Alanine Aminotransferase (ALT/SGPT): 29 (06-27)  Aspartate Aminotransferase (AST/SGOT): 24 (06-30-25 @ 05:46)  Aspartate Aminotransferase (AST/SGOT): 44 (06-29-25 @ 05:49)  Aspartate Aminotransferase (AST/SGOT): 75 (06-28-25 @ 16:04)  Aspartate Aminotransferase (AST/SGOT): 145 (06-28-25 @ 05:52)  Aspartate Aminotransferase (AST/SGOT): 65 (06-27-25 @ 09:30)  Bilirubin Total: 0.6 (06-30)  Bilirubin Total: 0.6 (06-29)  Bilirubin Total: 0.6 (06-28)  Bilirubin Total: 0.6 (06-28)  Bilirubin Total: 0.6 (06-27)      Trend LDH      Urinalysis Basic - ( 30 Jun 2025 05:46 )    Color: x / Appearance: x / SG: x / pH: x  Gluc: 185 mg/dL / Ketone: x  / Bili: x / Urobili: x   Blood: x / Protein: x / Nitrite: x   Leuk Esterase: x / RBC: x / WBC x   Sq Epi: x / Non Sq Epi: x / Bacteria: x        MICROBIOLOGY:    Female    Urinalysis with Rflx Culture (collected 27 Jun 2025 10:20)    Culture - Urine (collected 27 Jun 2025 10:20)  Source: Clean Catch None  Preliminary Report:    >100,000 CFU/ml Escherichia coli    Culture - Blood (collected 27 Jun 2025 09:30)  Source: Blood Blood-Peripheral  Final Report:    Growth in aerobic and anaerobic bottles: Staphylococcus aureus    See previous culture [id number]    Culture - Blood (collected 27 Jun 2025 09:30)  Source: Blood Blood-Peripheral  Final Report:    Growth in aerobic and anaerobic bottles: Staphylococcus aureus    Direct identification is available within approximately 3-5    hours either by Blood Panel Multiplexed PCR or Direct    MALDI-TOF. Details: [email or web address]  Organism: Blood Culture PCR  Staphylococcus aureus  Organism: Staphylococcus aureus    Sensitivities:      -  Clindamycin: S <=0.25      -  Oxacillin: S 0.5 Oxacillin predicts susceptibility for dicloxacillin, methicillin, and nafcillin      -  Gentamicin: S <=4 Should not be used as monotherapy      -  Vancomycin: S 1      -  Tetracycline: S <=4      Method Type: AUGUSTINE      -  Penicillin: R >2      -  Rifampin: S <=1 Should not be used as monotherapy      -  Erythromycin: S <=0.25      -  Trimethoprim/Sulfamethoxazole: S <=0.5/9.5  Organism: Blood Culture PCR    Sensitivities:      Method Type: PCR      -  Methicillin SENSITIVE Staphylococcus aureus (MSSA): Detec Any isolate of Staphylococcus aureus from a blood culture is NOT considered a contaminant.  Procalcitonin: 0.97 (06-27)  D-Dimer Assay, Quantitative: 863 (06-28)  Lactate, Blood: 2.4 (06-29 @ 15:44)  Lactate, Blood: 2.3 (06-29 @ 05:49)  Lactate, Blood: 2.2 (06-28 @ 16:04)  Lactate, Blood: 2.2 (06-28 @ 11:57)        RADIOLOGY & ADDITIONAL TESTS:  I have personally reviewed the relevant images.   CXR  Xray Chest 1 View- PORTABLE-Urgent:   ACC: [id number] EXAM:  XR CHEST PORTABLE URGENT 1V   ORDERED BY: TAL ARRIAZA     PROCEDURE DATE:  06/27/2025          INTERPRETATION:  Clinical History: Sepsis.    Comparison : Chest radiograph None.    Technique/Positioning: Frontal chest radiograph.    Findings:    Support devices: None.    Cardiac/mediastinum/hilum: Left chest port.    Lung parenchyma/Pleura: Retrocardiac density. 7 mm nodular right midlung   opacity versus summation of shadows. No pneumothorax.    Skeleton/soft tissues: Degenerative changes.    Impression:    Retrocardiac density. 7 mm nodular right midlung opacity versus summation   of shadows. Attention on CT which appears to been ordered by the time of   this dictation.    --- End of Report ---            ADEEL LUTHER MD; Attending Radiologist  This document has been electronically signed. Jun 27 2025 11:29AM (06-27-25 @ 09:52)      CT  CT Chest w/ IV Cont:   ACC: [id number] EXAM:  CT ABDOMEN AND PELVIS IC   ORDERED BY: TAL ARRIAZA     ACC: [id number] EXAM:  CT CHEST IC   ORDERED BY: TAL ARRIAZA     PROCEDURE DATE:  06/27/2025          INTERPRETATION:  CLINICAL STATEMENT: Fall  altered mental status. History   of pancreatic cancer.    TECHNIQUE: Contiguous axial CT images were obtained from the thoracic   inlet to the pubic symphysis following administration of intravenous   contrast.  95 cc administered of IOmnipaque 350 (accession [id number]) (5   cc discarded).  Oral contrast was not administered.  Reformatted images   in the coronal and sagittal planes, as well as MIP reconstructed images,   were acquired.      COMPARISON CT: None.    OTHER STUDIES USED FOR CORRELATION: None.      FINDINGS:    CHEST:    LUNGS/PLEURA/AIRWAYS: Small bilateral pleural effusions and bibasilar   atelectasis. Patchy consolidative opacities in the right upper and lower   lobe with scattered adjacent groundglass opacities. Few scattered solid   pulmonarynodules involving all lobes. For example, a left upper lobe   solid nodule measures 6 mm (303/48)..    MEDIASTINUM/THORACIC NODES: No lymphadenopathy. Atrophic thyroid gland.   Mildly distended fluid-filled esophagus HEART/GREAT VESSELS: Normal heart  size. No pericardial effusion..      ABDOMEN/PELVIS:    HEPATOBILIARY: Post cholecystectomy. Unremarkable liver.     SPLEEN: Unremarkable.    PANCREAS: Complex cystic structure in the region of the pancreatic   body/tail likely reflects a dilated main pancreatic duct measuring up to   1.9 cm with diffuse parenchymal atrophy and abrupt transition at the   level of the pancreatic head. A discrete pancreatic head mass not   visualized.Suggestion of mild soft tissue density surrounding the celiac   artery and the right hepatic artery (303/19)    ADRENAL GLANDS: Unremarkable.    KIDNEYS: Symmetric renal enhancement bilaterally. No hydronephrosis.   Subcentimeter hypodensities in both kidneys, too small to characterize.    ABDOMINOPELVIC NODES: No lymphadenopathy.    PELVIC ORGANS: Unremarkable.    PERITONEUM/MESENTERY/BOWEL: Gastric postsurgical changes. No bowel   obstruction obstruction or pneumoperitoneum. Diffuse mesenteric edema   noted. Moderate diffuse colonic stool burden.    BONES/SOFT TISSUES: No acute osseous abnormality. Degenerative changes of   the spine noted. Bilateral breast implants. Soft tissue anasarca.    OTHER: Left chest wall Mediport. Atherosclerotic vascular calcifications.   Left hepatic artery arises directly from the celiac axis.      IMPRESSION:  1.  No CT evidence of an acute traumatic intrathoracic or abdominopelvic   pathology.  2.  Patchy consolidative and nodular airspace opacities in the right   upper and lower lobes, suspicious for multifocal pneumonia in the   appropriate clinical setting. Follow-up is recommended.  3.  Few scattered bilateral solid pulmonary nodules. Metastatic disease   is a possibility. Follow-up per oncologic protocol.  4.  Diffuse pancreatic body/tail atrophy and ductal dilation with abrupt   transition at the level of the pancreatic head; suggestion of soft tissue   density surrounding the celiac and right hepatic artery, possibly tumor   encasement. Correlation with any available outside imaging is recommended.    --- End of Report ---            YO WILSON MD; Attending Radiologist  This document has been electronically signed. Jun 27 2025  1:59PM (06-27-25 @ 13:24)      < from: Xray Kidney Ureter Bladder (06.29.25 @ 19:14) >    There is moderate gaseous distention of the colon. Moderate fecal   retention within the rectum.    Surgical clips. Degenerative changes.    --- End of Report ---    < end of copied text >  < from: Xray Chest 1 View- PORTABLE-Routine (06.29.25 @ 07:23) >    Support devices: Stable positioning    Lung parenchyma/Pleura: New left basilar opacity/effusion    Cardiac/mediastinum/hilum: No significant change    Skeleton/soft tissues: No significant change.    --- End of Report ---    < end of copied text >    WEIGHT  Weight (kg): 58.2 (06-27-25 @ 18:21)  Creatinine: 1.0 mg/dL (06-30-25 @ 05:46)      All available historical records have been reviewed

## 2025-06-30 NOTE — CONSULT NOTE ADULT - ASSESSMENT
80-year-old female with past medical history of hypertension hyperlipidemia DVT on Eliquis recently being treated for pancreatic cancer on chemotherapy with chest port brought to ed for evaluation of the weakness. Patient found to have bacteremia. Palliative care consulted to assist with GOC.     Discussed with patient's spouse, Claudy, and introduced palliative care. He is aware that patient is very sick and states that he has been working on  arrangements. He is still trying to hold onto some hope that patient will be able to improve enough in order to receive further cancer therapy, but he acknowledges that that does not appear likely. We discussed the option of treating for comfort, and Claudy states he would like to give patient another 24 hours to see if there are any signs of improvement.     Of note, patient was on Morphine ER 30 q8h at home (confirmed with ). She was also on PRN PO dilaudid 4mg, which she has been taking less frequently. At this time, she only has IV morphine 2mg q4h PRN. Claudy states that she received this yesterday and had some relief. She did not appear to be in extremis today.     Plan:  - stop IV morphine and start IV dilaudid 0.5mg q3h PRN for pain  - based on clinical condition, will consider starting standing opioid  - can start dulcolax suppository daily PRN  - ongoing discussions as above    Palliative care will continue to follow.   Please call [phone] with questions or concerns .     Reviewed documentation from: [facility]  _____________  He Jose Latif MD  Palliative Medicine  [facility] [place] [facility]   of Geriatric and Palliative Medicine  [phone]

## 2025-06-30 NOTE — CONSULT NOTE ADULT - SUBJECTIVE AND OBJECTIVE BOX
Patient is a  80-year-old female with past medical history of hypertension hyperlipidemia DVT on Eliquis recently being treated for pancreatic cancer on chemotherapy with chest port brought to ed for evaluation of the weakness. Hx taken from the patient  at bedside. States that for the past few days, patient has been very weak, dehydrated, not eating and drinking well, unable to walk and unable to take care of her self. Also reported that patient has been more sleepy recently and had a fall a few back.  said she normally has some dementia however the last 3 days, Patient was recently becoming more demented prior to the fall.  Patient complaining of diffuse body pain afterwards          REVIEW OF SYSTEMS:All other systems negative    MEDICATIONS  (STANDING):  chlorhexidine 2% Cloths 1 Application(s) Topical <User Schedule>  dextrose 5%. 1000 milliLiter(s) (50 mL/Hr) IV Continuous <Continuous>  dextrose 5%. 1000 milliLiter(s) (100 mL/Hr) IV Continuous <Continuous>  dextrose 50% Injectable 25 Gram(s) IV Push once  dextrose 50% Injectable 12.5 Gram(s) IV Push once  dextrose 50% Injectable 25 Gram(s) IV Push once  diltiazem Infusion 5 mG/Hr (5 mL/Hr) IV Continuous <Continuous>  glucagon  Injectable 1 milliGRAM(s) IntraMuscular once  insulin lispro (ADMELOG) corrective regimen sliding scale   SubCutaneous three times a day before meals  lactated ringers. 1000 milliLiter(s) (75 mL/Hr) IV Continuous <Continuous>  nafcillin  IVPB 2 Gram(s) IV Intermittent every 4 hours  pancrelipase  (CREON 12,000 Lipase Units) 2 Capsule(s) Oral three times a day with meals  pantoprazole    Tablet 40 milliGRAM(s) Oral before breakfast  sertraline 50 milliGRAM(s) Oral daily  tiZANidine 4 milliGRAM(s) Oral at bedtime  vitamin A & D Ointment 1 Application(s) Topical two times a day    MEDICATIONS  (PRN):  acetaminophen     Tablet .. 650 milliGRAM(s) Oral every 6 hours PRN Temp greater or equal to 38C (100.4F), Mild Pain (1 - 3)  dextrose Oral Gel 15 Gram(s) Oral once PRN Blood Glucose LESS THAN 70 milliGRAM(s)/deciliter  HYDROmorphone  Injectable 0.5 milliGRAM(s) IV Push every 3 hours PRN Severe Pain (7 - 10)  metoprolol tartrate Injectable 5 milliGRAM(s) IV Push every 6 hours PRN heart rate above 110      Allergies  No Known Allergies  Intolerances        SOCIAL HISTORY:    FAMILY HISTORY:         PHYSICAL EXAM:  Vital Signs Last 24 Hrs  T(C): 36.3 (30 Jun 2025 07:20), Max: 36.3 (30 Jun 2025 07:20)  T(F): 97.3 (30 Jun 2025 07:20), Max: 97.3 (30 Jun 2025 07:20)  HR: 87 (30 Jun 2025 13:15) (86 - 151)  BP: 98/57 (30 Jun 2025 13:15) (98/57 - 150/65)  BP(mean): 72 (30 Jun 2025 13:15) (72 - 93)  RR: 20 (30 Jun 2025 13:15) (18 - 23)  SpO2: 98% (30 Jun 2025 13:15) (93% - 99%)    Parameters below as of 30 Jun 2025 07:20  Patient On (Oxygen Delivery Method): nasal cannula        General : NAD    HEENT:  NC/AT, PERRL, EOMI, sclera clear, mucosa moist, throat clear, trachea midline, neck supple  Cardiovascular: RRR   Respiratory: equal chest rise  Gastrointestinal  : Soft NT/ND (+)BS  Neurology:  Weakened strength & sensation   Psych: Calm  Musculoskeletal:  limited   Skin:  Deep tissue pressure injury     LABS/ CULTURES/ RADIOLOGY:                        8.7    8.78  )-----------( 29       ( 30 Jun 2025 05:46 )             26.7       137  |  101  |  51  ----------------------------<  185      [06-30-25 @ 05:46]  3.6   |  20  |  1.0        Ca     9.4     [06-30-25 @ 05:46]      Mg     2.4     [06-30-25 @ 05:46]      Phos  2.6     [06-29-25 @ 05:49]    TPro  4.4  /  Alb  1.8  /  TBili  0.6  /  DBili  x   /  AST  24  /  ALT  15  /  AlkPhos  132  [06-30-25 @ 05:46]    PT/INR: PT 16.00, INR 1.35       [06-28-25 @ 16:04]  PTT: 38.3       [06-28-25 @ 16:04]          Culture - Blood (collected 06-29-25 @ 05:49)  Source: Blood None  Gram Stain (06-30-25 @ 12:29):    Growth in aerobic bottle: Gram Positive Cocci in Clusters    Growth in anaerobic bottle: Gram Positive Cocci in Clusters  Preliminary Report (06-30-25 @ 10:03):    Growth in aerobic bottle: Gram Positive Cocci in Clusters    Culture - Blood (collected 06-29-25 @ 05:49)  Source: Blood None  Gram Stain (06-30-25 @ 12:29):    Growth in aerobic bottle: Gram Positive Cocci in Clusters    Growth in anaerobic bottle: Gram Positive Cocci in Clusters  Preliminary Report (06-30-25 @ 12:29):    Growth in aerobic bottle: Gram Positive Cocci in Clusters    Growth in anaerobic bottle: Gram Positive Cocci in Clusters    Culture - Blood (collected 06-27-25 @ 09:30)  Source: Blood Blood-Peripheral  Gram Stain (06-28-25 @ 03:38):    Growth in aerobic bottle: Gram Positive Cocci in Clusters    Growth in anaerobic bottle: Gram Positive Cocci in Clusters  Final Report (06-29-25 @ 22:47):    Growth in aerobic and anaerobic bottles: Staphylococcus aureus    See previous culture [id number]    Culture - Blood (collected 06-27-25 @ 09:30)  Source: Blood Blood-Peripheral  Gram Stain (06-28-25 @ 04:38):    Growth in aerobic bottle: Gram Positive Cocci in Clusters    Growth in anaerobic bottle: Gram Positive Cocci in Clusters  Final Report (06-28-25 @ 22:32):    Growth in aerobic and anaerobic bottles: Staphylococcus aureus    Direct identification is available within approximately 3-5    hours either by Blood Panel Multiplexed PCR or Direct    MALDI-TOF. Details: [email or web address]  Organism: Blood Culture PCR  Staphylococcus aureus (06-29-25 @ 16:35)  Organism: Staphylococcus aureus (06-29-25 @ 16:35)      -  Clindamycin: S <=0.25      -  Oxacillin: S 0.5 Oxacillin predicts susceptibility for dicloxacillin, methicillin, and nafcillin      -  Gentamicin: S <=4 Should not be used as monotherapy      -  Vancomycin: S 1      -  Tetracycline: S <=4      Method Type: AUGUSTINE      -  Penicillin: R >2      -  Rifampin: S <=1 Should not be used as monotherapy      -  Erythromycin: S <=0.25      -  Trimethoprim/Sulfamethoxazole: S <=0.5/9.5  Organism: Blood Culture PCR (06-29-25 @ 16:35)      Method Type: PCR      -  Methicillin SENSITIVE Staphylococcus aureus (MSSA): Detec Any isolate of Staphylococcus aureus from a blood culture is NOT considered a contaminant.

## 2025-06-30 NOTE — PHARMACOTHERAPY INTERVENTION NOTE - COMMENTS
Recommended to switch cefazolin to nafcillin 2g IV q4h as per Dr Irwin's recommendations due to concern of suspected endocarditis and potential for inoculum effect with cefazolin in high inoculum infections.
Provider to switch cefazolin to nafcillin, per susceptibility report- recommend 2g IV q4hrs

## 2025-06-30 NOTE — CONSULT NOTE ADULT - SUBJECTIVE AND OBJECTIVE BOX
HPI:   80-year-old female with past medical history of hypertension hyperlipidemia DVT on Eliquis recently being treated for pancreatic cancer on chemotherapy with chest port brought to ed for evaluation of the weakness. Hx taken from the patient  at bedside. States that for the past few days, patient has been very weak, dehydrated, not eating and drinking well, unable to walk and unable to take care of her self. Also reported that patient has been more sleepy recently and had a fall a few back.  said she normally has some dementia however the last 3 days, Patient was recently becoming more demented prior to the fall.  Patient complaining of diffuse body pain afterwards  (27 Jun 2025 15:17)    Patient lying in bed. No family present.     ITEMS NOT CHECKED ARE NOT PRESENT    SOCIAL HISTORY:   Significant other/partner[x ]  Children[ ]  Yazidism/Spirituality:  Substance hx:  [ ]   Tobacco hx:  [ ]   Alcohol hx: [ ]   Living Situation: [x ]Home  [ ]Long term care  [ ]Rehab [ ]Other  Home Services: [ ] HHA [ ] Visting RN [ ] Hospice  Occupation:  Home Opioid hx:  [x ] Y [ ] N   I-Stop Reference No: [id number]    PDI	Current Rx	Drug Type	Rx Written	Rx Dispensed	Drug	Quantity	Days Supply	Prescriber Name	Prescriber MARILYN #	Payment Method	Dispenser  A	Y	O	06/11/2025	06/15/2025	morphine sulf er 30 mg tablet	90	30	Saloni Vazquez	[id number]	Insurance	[facility] #[id number]  A	N	O	05/09/2025	05/14/2025	morphine sulf er 30 mg tablet	90	30	Saloni Vazquez	[id number]	Insurance	[facility] #[id number]  A	N	O	05/07/2025	05/10/2025	hydromorphone 4 mg tablet	120	30	VazquezSaloni	[id number]	Insurance	[facility] #[id number]  A	N	O	04/11/2025	04/14/2025	morphine sulf er 30 mg tablet	90	30	Henna Pickens A	[id number]	Insurance	[facility] #[id number]  A	N	O	04/07/2025	04/08/2025	hydromorphone 4 mg tablet	120	30	Saloni Vazquez	[id number]	Insurance	[facility] #[id number]    ADVANCE DIRECTIVES:    [ ] Full Code [x ] DNR  MOLST  [ ]  Living Will  [ ]   DECISION MAKER(s):  [ ] Health Care Proxy(s)  [ ] Surrogate(s)  [ ] Guardian           Name(s): Phone Number(s):    BASELINE (I)ADL(s) (prior to admission):  [place]: [ ]Total  [ ] Moderate [ ]Dependent      Allergies    No Known Allergies    Intolerances    MEDICATIONS  (STANDING):  ceFAZolin   IVPB 2000 milliGRAM(s) IV Intermittent every 8 hours  chlorhexidine 2% Cloths 1 Application(s) Topical <User Schedule>  dextrose 5%. 1000 milliLiter(s) (50 mL/Hr) IV Continuous <Continuous>  dextrose 5%. 1000 milliLiter(s) (100 mL/Hr) IV Continuous <Continuous>  dextrose 50% Injectable 25 Gram(s) IV Push once  dextrose 50% Injectable 12.5 Gram(s) IV Push once  dextrose 50% Injectable 25 Gram(s) IV Push once  diltiazem Infusion 5 mG/Hr (5 mL/Hr) IV Continuous <Continuous>  glucagon  Injectable 1 milliGRAM(s) IntraMuscular once  insulin lispro (ADMELOG) corrective regimen sliding scale   SubCutaneous three times a day before meals  lactated ringers. 1000 milliLiter(s) (75 mL/Hr) IV Continuous <Continuous>  pancrelipase  (CREON 12,000 Lipase Units) 2 Capsule(s) Oral three times a day with meals  pantoprazole    Tablet 40 milliGRAM(s) Oral before breakfast  sertraline 50 milliGRAM(s) Oral daily  tiZANidine 4 milliGRAM(s) Oral at bedtime  vitamin A & D Ointment 1 Application(s) Topical two times a day    MEDICATIONS  (PRN):  acetaminophen     Tablet .. 650 milliGRAM(s) Oral every 6 hours PRN Temp greater or equal to 38C (100.4F), Mild Pain (1 - 3)  dextrose Oral Gel 15 Gram(s) Oral once PRN Blood Glucose LESS THAN 70 milliGRAM(s)/deciliter  metoprolol tartrate Injectable 5 milliGRAM(s) IV Push every 6 hours PRN heart rate above 110  morphine  - Injectable 2 milliGRAM(s) IV Push every 4 hours PRN Moderate Pain (4 - 6)      PRESENT SYMPTOMS: [x ]Unable to obtain due to poor mentation   Source if other than patient:  [ ]Family   [ ]Team     Pain: [ ]yes [ ]no  QOL impact -   Location -                    Aggravating factors -  Alleviating factors -   Quality -  Radiation -  Timing -   Severity (0-10 scale):  Minimal acceptable level (0-10 scale):     CPOT:  3  https://www.[email or web address]/getattachment/[email or web address]/Critical-Care-Pain-Observation-Tool-(CPOT)    PAIN AD Score:   http://geriatrictoolkit.[facility]/cog/painad.pdf     Dyspnea:                           [ ]None[ ]Mild [ ]Moderate [ ]Severe     Respiratory Distress Observation Scale (RDOS): 2  A score of 0 to 2 signifies little or no respiratory distress, 3 signifies mild distress, scores 4 to 6 indicate moderate distress, and scores greater than 7 signify severe distress  [email or web address]/sites/default/[email or web address]-vkodpbahtpy-eybdvarr-bdzzdmzhczm-zgitj.pdf    Anxiety:                             [ ]None[ ]Mild [ ]Moderate [ ]Severe   Fatigue:                             [ ]None[ ]Mild [ ]Moderate [ ]Severe   Nausea:                             [ ]None[ ]Mild [ ]Moderate [ ]Severe   Loss of appetite:              [ ]None[ ]Mild [ ]Moderate [ ]Severe   Constipation:                    [ ]None[ ]Mild [ ]Moderate [ ]Severe    Other Symptoms:  [ ]All other review of systems negative     PHYSICAL EXAM:    GENERAL:  NAD   PULMONARY:  Non labored breathing  NEUROLOGIC: Does not respond to verbal stimuli  BEHAVIORAL/PSYCH:  Calm    Palliative Performance Status Version 2:      10   %    http://[facility].org/files/news/palliative_performance_scale_ppsv2.pdf    LABS: I have reviewed daily labs, including                          8.7    8.78  )-----------( 29 ( 30 Jun 2025 05:46 )             26.7       06-30    137  |  101  |  51[H]  ----------------------------<  185[H]  3.6   |  20  |  1.0    Ca    9.4      30 Jun 2025 05:46  Phos  2.6     06-29  Mg     2.4     06-30    TPro  4.4[L]  /  Alb  1.8[L]  /  TBili  0.6  /  DBili  x   /  AST  24  /  ALT  15  /  AlkPhos  132[H]  06-30          RADIOLOGY & ADDITIONAL STUDIES: I have independently reviewed new imaging, including  CXR 6/30: L effusion    PROTEIN CALORIE MALNUTRITION PRESENT: [ ]mild [ ]moderate [ ]severe [ ]underweight [ ]morbid obesity  https://www.andeal.org/vault/2440/web/files/ONC/Table_Clinical%20Characteristics%20to%20Document%20Malnutrition-White%20JV%20et%20al%202012.pdf    Height (cm): 172.7 (06-27-25 @ 18:21)  Weight (kg): 58.2 (06-27-25 @ 18:21)  BMI (kg/m2): 19.5 (06-27-25 @ 18:21)    [ ]PPSV2 < or = to 30% [ ]significant weight loss  [ ]poor nutritional intake  [ ]anasarca      [ ]Artificial Nutrition      Palliative Care Spiritual/Emotional Screening Tool Question  Severity (0-4):                    OR                    [ x] Unable to determine. Will assess at later time if appropriate.  Score of 2 or greater indicates recommendation of Chaplaincy and/or SW referral  Chaplaincy Referral: [ ] Yes [ ] Refused [ ] Following     Caregiver [place]:  [ ] Yes [ ] No    OR    [x ] Unable to determine. Will assess at later time if appropriate.  Social Work Referral [ ]  Patient and Family Centered Care Referral [ ]    Anticipatory Grief Present: [ ] Yes [ ] No    OR     [ x] Unable to determine. Will assess at later time if appropriate.  Social Work Referral [ ]  Patient and Family Centered Care Referral [ ]    REFERRALS:   [ ]Chaplaincy  [ ]Hospice  [ ]Child Life  [ ]Social Work  [ ]Case management [ ]Holistic Therapy     Palliative care education provided to patient and/or family

## 2025-06-30 NOTE — OCCUPATIONAL THERAPY INITIAL EVALUATION ADULT - SPECIFY REASON(S)
Chart reviewed and attempted to be seen by Occupational Therapist, Pt on hold as per ANGEL Jean-Baptiste 2/2 lethargic. OT to F/u as appropriate.

## 2025-06-30 NOTE — PROGRESS NOTE ADULT - ASSESSMENT
This is a 80-year-old female with past medical history of hypertension hyperlipidemia DVT being treated for pancreatic cancer on chemotherapy with chest port brought to ed for evaluation of the weakness.    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    Covid/Flu/RSV negative   MRSA nares negative     RECOMMENDATIONS  -Repeat blood cultures daily.  -Follow up with urine cultures. Noted E.coli   -TTE. May need JOSE if remains bacteremic.  -Surgery evaluation-the chest wall port will need to be removed.  -IV Cefazolin 2 gram q 8 hours.   -Wound care evaluation for sacrum injury.  -Avoid constipation.  -Off loading to prevent pressure sores and preventive measures to avoid aspiration. Guarded prognosis. GOC.    If any questions, please send a message or call on [employer] Teams  Please continue to update ID with any pertinent new laboratory or radiographic findings.    Amado Irwin M.D  Infectious Diseases Attending/   [employer] at [facility]   This is a 80-year-old female with past medical history of hypertension hyperlipidemia DVT being treated for pancreatic cancer on chemotherapy with chest port brought to ed for evaluation of the weakness.    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    Covid/Flu/RSV negative   MRSA nares negative     RECOMMENDATIONS  -Repeat blood cultures daily.  -Follow up with urine cultures. Noted E.coli   -TTE. May need JOSE if remains bacteremic.  -Surgery evaluation-the chest wall port will need to be removed.  -IV Naficillin 2 gram q 4 hours.   -Wound care evaluation for sacrum injury.  -Avoid constipation.  -Off loading to prevent pressure sores and preventive measures to avoid aspiration. Guarded prognosis. GOC.    If any questions, please send a message or call on [employer] Teams  Please continue to update ID with any pertinent new laboratory or radiographic findings.    Amado Irwin M.D  Infectious Diseases Attending/   [employer] at [facility]

## 2025-06-30 NOTE — CONSULT NOTE ADULT - CONVERSATION DETAILS
Discussed with patient's spouse, Claudy, and introduced palliative care. He is aware that patient is very sick and states that he has been working on  arrangements. He is still trying to hold onto some hope that patient will be able to improve enough in order to receive further cancer therapy, but he acknowledges that that does not appear likely. We discussed the option of treating for comfort, and Claudy states he would like to give patient another 24 hours to see if there are any signs of improvement.

## 2025-06-30 NOTE — SWALLOW BEDSIDE ASSESSMENT ADULT - ADDITIONAL RECOMMENDATIONS
SLP to f/u GOC conversation to determine to comfort care feeds vs. long term means of nutrition/hydration. SLP to f/u.

## 2025-06-30 NOTE — PROGRESS NOTE ADULT - SUBJECTIVE AND OBJECTIVE BOX
24H events:    Patient is a 74y old Female who presents with a chief complaint of weakness (30 Jun 2025 15:00)    Primary diagnosis of Sepsis due to pneumonia      Today is 3d of hospitalization. This morning patient was seen and examined at bedside, resting comfortably in bed.    No acute or major events overnight.    PAST MEDICAL & SURGICAL HISTORY    SOCIAL HISTORY:  Social History:      ALLERGIES:  No Known Allergies    MEDICATIONS:  STANDING MEDICATIONS  bacitracin   Ointment 1 Application(s) Topical two times a day  chlorhexidine 2% Cloths 1 Application(s) Topical <User Schedule>  diltiazem Infusion 5 mG/Hr IV Continuous <Continuous>  glucagon  Injectable 1 milliGRAM(s) IntraMuscular once  insulin lispro (ADMELOG) corrective regimen sliding scale   SubCutaneous three times a day before meals  lactated ringers. 1000 milliLiter(s) IV Continuous <Continuous>  nafcillin  IVPB 2 Gram(s) IV Intermittent every 4 hours  pancrelipase  (CREON 12,000 Lipase Units) 2 Capsule(s) Oral three times a day with meals  pantoprazole    Tablet 40 milliGRAM(s) Oral before breakfast  sertraline 50 milliGRAM(s) Oral daily  tiZANidine 4 milliGRAM(s) Oral at bedtime  vitamin A & D Ointment 1 Application(s) Topical two times a day    PRN MEDICATIONS  acetaminophen     Tablet .. 650 milliGRAM(s) Oral every 6 hours PRN  dextrose Oral Gel 15 Gram(s) Oral once PRN  HYDROmorphone  Injectable 0.5 milliGRAM(s) IV Push every 3 hours PRN  metoprolol tartrate Injectable 5 milliGRAM(s) IV Push every 6 hours PRN    VITALS:   T(F): 96.8  HR: 86  BP: 121/61  RR: 18  SpO2: 100%    PHYSICAL EXAM:  GENERAL: ill-appearing  HEAD:  Atraumatic, normocephalic  EYES: EOMI, PERRLA, conjunctiva and sclera clear  NECK: Supple, no JVD  HEART: Regular rate and rhythm, no murmurs, rubs, or gallops  LUNGS: diminished bs  ABDOMEN: Soft, nontender, nondistended, +BS  EXTREMITIES: bilaterally. No clubbing, cyanosis, or edema  NERVOUS SYSTEM:  A&Ox0,     LABS:                        8.7    8.78  )-----------( 29       ( 30 Jun 2025 05:46 )             26.7     06-30    137  |  101  |  51[H]  ----------------------------<  185[H]  3.6   |  20  |  1.0    Ca    9.4      30 Jun 2025 05:46  Phos  2.6     06-29  Mg     2.4     06-30    TPro  4.4[L]  /  Alb  1.8[L]  /  TBili  0.6  /  DBili  x   /  AST  24  /  ALT  15  /  AlkPhos  132[H]  06-30      Urinalysis Basic - ( 30 Jun 2025 05:46 )    Color: x / Appearance: x / SG: x / pH: x  Gluc: 185 mg/dL / Ketone: x  / Bili: x / Urobili: x   Blood: x / Protein: x / Nitrite: x   Leuk Esterase: x / RBC: x / WBC x   Sq Epi: x / Non Sq Epi: x / Bacteria: x            Culture - Blood (collected 29 Jun 2025 05:49)  Source: Blood None  Gram Stain (30 Jun 2025 12:29):    Growth in aerobic bottle: Gram Positive Cocci in Clusters    Growth in anaerobic bottle: Gram Positive Cocci in Clusters  Preliminary Report (30 Jun 2025 12:29):    Growth in aerobic bottle: Gram Positive Cocci in Clusters    Growth in anaerobic bottle: Gram Positive Cocci in Clusters    Culture - Blood (collected 29 Jun 2025 05:49)  Source: Blood None  Gram Stain (30 Jun 2025 12:29):    Growth in aerobic bottle: Gram Positive Cocci in Clusters    Growth in anaerobic bottle: Gram Positive Cocci in Clusters  Preliminary Report (30 Jun 2025 16:07):    Growth in aerobic bottle: Gram Positive Cocci in Clusters    Growth in anaerobic bottle: Gram Positive Cocci in Clusters          RADIOLOGY:    ASSESSMENT AND PLAN  LINDA ONEAL is a 81q-wjoj-shu Female with a history significant for    MSSA bacteremia- Unclear Source possible port  Multifocal Pneumonia   Thrombocytopenia   Metabolic encephalopathy   Fall with Sacral bruising  CKD 3  Transaminitis  Pancreatic CA on chemo via left sided Chest wall port  Pulmonary Nodules  HO HTN  HO DVT       PLAN:      CNS: avoid sedation, CTH and C spine noted     HEENT: Oral care    PULMONARY:  HOB @ 45 degrees. Aspiration precautions, wean oxygen, CXR and Ct chest noted with PNA and possible metastatic spread, Incentive tomás     CARDIOVASCULAR: avoid volume overload, MAP adequate, f/u echo, may need hebert for MSSA bacteremia, trend troponin, goal net even FB      GI: GI prophylaxis.  Feeding after SLP eval      RENAL: Follow up lytes. Correct as needed, f/u UO, place Aguilera if retaining     INFECTIOUS DISEASE: Follow up cultures, procalcitonin, repeat until clearance, f/u MRSA nares and RVP negative, deescalate to cefazolin, may need port removed, fu surgery      HEMATOLOGICAL: Trend platelets hold DVT PPX <50, US duplex noted, SCD      ENDOCRINE:  Follow up FS. Insulin protocol if needed.    MUSCULOSKELETAL: ambulate as tolerated    SDU   Pallative eval     #Handoff  - plan is to give 1u platelets, then take out chest port

## 2025-06-30 NOTE — SWALLOW BEDSIDE ASSESSMENT ADULT - SWALLOW EVAL: DIAGNOSIS
Pt not appropriate for PO trials Pt not appropriate for PO trials. Increased lethargy. Decreased awareness of feeding situation despite max verbal and tactile cues.

## 2025-06-30 NOTE — CONSULT NOTE ADULT - ASSESSMENT
This is a 80-year-old female with past medical history of hypertension, hyperlipidemia, DVT being treated for pancreatic cancer on chemotherapy with chest port brought to ed for evaluation of the weakness ans s/p fall.    Heme/onc consulted for thrombocytopenia     #Sepsis - MSSA bacteremia  #Multifocal Pneumonia   #Metabolic encephalopathy   #Fall with Sacral bruising  #CKD 3  #Pancreatic CA on chemo via left sided Chest wall port  #Pulmonary Nodules  #thrombocytopenia is multifactorial 2/2 myelosuppression due to recent chemo vs infection vs chronic disease  #normocytic anemia 2/2 chemo    -Follows at [facility] last chemo?  -HIT ruled out - Heparin PF4 ab: negative      RECOMMENDATIONS:  - PRELIM       This is a 80-year-old female with past medical history of hypertension, hyperlipidemia, DVT being treated for pancreatic cancer on chemotherapy with chest port brought to ED for evaluation of the weakness ans s/p fall. Admitted in ICU for AMS and bacteremia.     Heme/onc consulted for thrombocytopenia     #Sepsis - MSSA bacteremia  #Multifocal Pneumonia   #Metabolic encephalopathy   #Pancreatic CA on chemo (Gemcitabine and Abraxane last received 6.20.25) via left sided chest wall port  #Pulmonary Nodules malignant ?  #thrombocytopenia is multifactorial 2/2 myelosuppression due to recent chemo and active infection  #normocytic anemia 2/2 chemo    -Follows with Dr. Brook Everett at [facility] [place]  -Per pt's  diagnosed pancreatic cancer which was unresectable in 11/2024 on Gemcitabine and Abraxane    Recommendations:  Patient is critically ill, currently admitted to the ICU with altered mental status and bacteremia. History of pancreatic cancer, currently undergoing chemotherapy via a chemoport. Now found to have thrombocytopenia, likely multifactorial — attributed to both active chemotherapy and ongoing infection. Chemoport is suspected to be a potential source of infection. The primary team is considering port removal. If the decision is made to proceed, please transfuse 1 unit of platelets prior to the procedure due to thrombocytopenia. Patient has poor overall prognosis given underlying advanced malignancy, infection, and current clinical status. The patient’s  has had a discussion with the palliative care team and is leaning toward CMO.      This is a 80-year-old female with past medical history of hypertension, hyperlipidemia, DVT being treated for pancreatic cancer on chemotherapy with chest port brought to ED for evaluation of the weakness ans s/p fall. Admitted in ICU for AMS and bacteremia.     Heme/onc consulted for thrombocytopenia     #Sepsis - MSSA bacteremia  #Multifocal Pneumonia   #Metabolic encephalopathy   #Pancreatic CA on chemo (Gemcitabine and Abraxane last received 6.20.25) via left sided chest wall port  #Pulmonary Nodules malignant ?  #thrombocytopenia is multifactorial 2/2 myelosuppression due to recent chemo and active infection  #normocytic anemia 2/2 chemo    -Follows with Dr. Brook Everett at [facility] [place]  -Per pt's  diagnosed pancreatic cancer which was unresectable in 11/2024 on Gemcitabine and Abraxane    Recommendations:  Patient is critically ill, currently admitted to the ICU with altered mental status and bacteremia. History of pancreatic cancer, currently undergoing chemotherapy via a chemoport. Now found to have thrombocytopenia, likely multifactorial — attributed to both active chemotherapy and ongoing infection. Chemoport is suspected to be a potential source of infection. The primary team is considering port removal. If the decision is made to proceed, please transfuse 1 unit of platelets prior to the procedure due to thrombocytopenia. Patient has poor overall prognosis given underlying advanced malignancy, infection, and current clinical status. The patient’s  has had a discussion with the palliative care team and is leaning toward CMO.     The patient/hudband seen with Dr. White via telemedicine      This is a 80-year-old female with past medical history of hypertension, hyperlipidemia, DVT being treated for pancreatic cancer on chemotherapy with chest port brought to ED for evaluation of the weakness ans s/p fall. Admitted in ICU for AMS and bacteremia.     Heme/onc consulted for thrombocytopenia     #Sepsis - MSSA bacteremia  #Multifocal Pneumonia   #Metabolic encephalopathy   #Pancreatic CA on chemo (Gemcitabine and Abraxane last received 6.20.25) via left sided chest wall port  #Pulmonary Nodules malignant ?  #thrombocytopenia is multifactorial 2/2 myelosuppression due to recent chemo and active infection  #normocytic anemia 2/2 chemo    -Follows with Dr. Brook Everett at [facility] [place]  -Per pt's  diagnosed pancreatic cancer which was unresectable in 11/2024 on Gemcitabine and Abraxane    Recommendations:  Patient is critically ill, currently admitted to the ICU with altered mental status and bacteremia. History of pancreatic cancer, currently undergoing chemotherapy via a chemoport. Now found to have thrombocytopenia, likely multifactorial — attributed to both active chemotherapy and ongoing infection. Chemoport is suspected to be a potential source of infection. The primary team is considering port removal. If the decision is made to proceed, please transfuse 1 unit of platelets prior to the procedure due to thrombocytopenia. Patient has poor overall prognosis given underlying advanced malignancy, infection, and current clinical status. The patient’s  has had a discussion with the palliative care team and is leaning toward CMO.     The patient seen with Dr. White via telemedicine

## 2025-06-30 NOTE — PROGRESS NOTE ADULT - ASSESSMENT
This is a 80-year-old female with past medical history of hypertension, hyperlipidemia, DVT being treated for pancreatic cancer on chemotherapy with chest port brought to ed for evaluation of the weakness ans s/p fall.    MSSA bacteremia- Unclear Source possible chemoport  Multifocal Pneumonia   Thrombocytopenia   Metabolic encephalopathy   Fall with Sacral bruising  CKD 3  Transaminitis  Pancreatic CA on chemo via left sided Chest wall port  Pulmonary Nodules  Afib RVR   HO HTN  HO DVT     -Bcx noted, MSSA  -repeat cultures in lab, repeat daily until two negatives  -ucx noted e. coli  -IV cefazolin-might change to nafcillin per ID  -f/u TTE might need JOSE? though will likley not    -surgery to remove chemoport   -monitor plt's, suspect multifactorial in setting of active cancer pt on chemo as well as sepsis/bacteremia, f/u heme/onc consult, continue to trend   -started on cardizem drip overnight for RVR, wean off, switch to po when able to tolerate, currently cannot do po due to poor mentation and lethargy   -GOC noted, palliative appreciated,  already thinking about  arrangements, wants to re-assess in 24 hrs but is considering comfort care   -switch morphine to dilaudid per palliative   -prognosis very poor

## 2025-06-30 NOTE — PROGRESS NOTE ADULT - ASSESSMENT
IMPRESSION:    MSSA bacteremia- Unclear Source  Multifocal Pneumonia   Thrombocytopenia   Metabolic encephalopathy   Fall with Sacral bruising  CKD 3  Transaminitis  Pancreatic CA on chemo via left sided Chest wall port  Pulmonary Nodules  HO HTN  HO DVT       PLAN:      CNS: avoid sedation, CTH and C spine noted     HEENT: Oral care    PULMONARY:  HOB @ 45 degrees. Aspiration precautions, wean oxygen, CXR and Ct chest noted with PNA and possible metastaic spread, Incentive tomás     CARDIOVASCULAR: avoid volume overload, MAP adequate, f/u echo, may need hebert for MSSA bacteremia, trend troponin, goal net even FB      GI: GI prophylaxis.  Feeding after SLP eval      RENAL: Follow up lytes. Correct as needed, f/u UO, place Aguilera if retaining     INFECTIOUS DISEASE: Follow up cultures, procalcitonin, repeat until clearance, f/u MRSA nares and RVP, vancomycin and cefepime for now, deescalate to cefazolin if MRSA negative, may need port removed, fu surgery      HEMATOLOGICAL: Trend platelts, hold DVT PPX <50, US duplex noted     ENDOCRINE:  Follow up FS. Insulin protocol if needed.    MUSCULOSKELETAL: ambulate as tolerated    SDU   Pallative eval              IMPRESSION:    MSSA bacteremia- Unclear Source  Multifocal Pneumonia   Thrombocytopenia   Metabolic encephalopathy   Fall with Sacral bruising  CKD 3  Transaminitis  Pancreatic CA on chemo via left sided Chest wall port  Pulmonary Nodules  HO HTN  HO DVT       PLAN:      CNS: avoid sedation, CTH and C spine noted     HEENT: Oral care    PULMONARY:  HOB @ 45 degrees. Aspiration precautions, wean oxygen, CXR and Ct chest noted with PNA and possible metastatic spread, Incentive tomás     CARDIOVASCULAR: avoid volume overload, MAP adequate, f/u echo, may need hebert for MSSA bacteremia, trend troponin, goal net even FB      GI: GI prophylaxis.  Feeding after SLP eval      RENAL: Follow up lytes. Correct as needed, f/u UO, place Aguilera if retaining     INFECTIOUS DISEASE: Follow up cultures, procalcitonin, repeat until clearance, f/u MRSA nares and RVP negative, deescalate to cefazolin, may need port removed, fu surgery      HEMATOLOGICAL: Trend platelets hold DVT PPX <50, US duplex noted, SCD      ENDOCRINE:  Follow up FS. Insulin protocol if needed.    MUSCULOSKELETAL: ambulate as tolerated    SDU   Pallative eval              IMPRESSION:    MSSA bacteremia- Unclear Source possible port  Multifocal Pneumonia   Thrombocytopenia   Metabolic encephalopathy   Fall with Sacral bruising  CKD 3  Transaminitis  Pancreatic CA on chemo via left sided Chest wall port  Pulmonary Nodules  HO HTN  HO DVT       PLAN:      CNS: avoid sedation, CTH and C spine noted     HEENT: Oral care    PULMONARY:  HOB @ 45 degrees. Aspiration precautions, wean oxygen, CXR and Ct chest noted with PNA and possible metastatic spread, Incentive tomás     CARDIOVASCULAR: avoid volume overload, MAP adequate, f/u echo, may need hebert for MSSA bacteremia, trend troponin, goal net even FB      GI: GI prophylaxis.  Feeding after SLP eval      RENAL: Follow up lytes. Correct as needed, f/u UO, place Aguilera if retaining     INFECTIOUS DISEASE: Follow up cultures, procalcitonin, repeat until clearance, f/u MRSA nares and RVP negative, deescalate to cefazolin, may need port removed, fu surgery      HEMATOLOGICAL: Trend platelets hold DVT PPX <50, US duplex noted, SCD      ENDOCRINE:  Follow up FS. Insulin protocol if needed.    MUSCULOSKELETAL: ambulate as tolerated    SDU   Pallative eval

## 2025-07-01 LAB
ALBUMIN SERPL ELPH-MCNC: 1.8 G/DL — LOW (ref 3.5–5.2)
ALP SERPL-CCNC: 119 U/L — HIGH (ref 30–115)
ALT FLD-CCNC: 6 U/L — SIGNIFICANT CHANGE UP (ref 0–41)
ANION GAP SERPL CALC-SCNC: 16 MMOL/L — HIGH (ref 7–14)
APTT BLD: 27.5 SEC — SIGNIFICANT CHANGE UP (ref 27–39.2)
AST SERPL-CCNC: 20 U/L — SIGNIFICANT CHANGE UP (ref 0–41)
BASOPHILS # BLD AUTO: 0.01 K/UL — SIGNIFICANT CHANGE UP (ref 0–0.2)
BASOPHILS # BLD AUTO: 0.03 K/UL — SIGNIFICANT CHANGE UP (ref 0–0.2)
BASOPHILS NFR BLD AUTO: 0.1 % — SIGNIFICANT CHANGE UP (ref 0–2)
BASOPHILS NFR BLD AUTO: 0.4 % — SIGNIFICANT CHANGE UP (ref 0–2)
BILIRUB SERPL-MCNC: 0.9 MG/DL — SIGNIFICANT CHANGE UP (ref 0.2–1.2)
BUN SERPL-MCNC: 40 MG/DL — HIGH (ref 10–20)
CALCIUM SERPL-MCNC: 9.1 MG/DL — SIGNIFICANT CHANGE UP (ref 8.4–10.5)
CHLORIDE SERPL-SCNC: 101 MMOL/L — SIGNIFICANT CHANGE UP (ref 98–110)
CO2 SERPL-SCNC: 22 MMOL/L — SIGNIFICANT CHANGE UP (ref 17–32)
CREAT SERPL-MCNC: 0.8 MG/DL — SIGNIFICANT CHANGE UP (ref 0.7–1.5)
CULTURE RESULTS: ABNORMAL
CULTURE RESULTS: ABNORMAL
D DIMER BLD IA.RAPID-MCNC: 2085 NG/ML DDU — HIGH
EGFR: 78 ML/MIN/1.73M2 — SIGNIFICANT CHANGE UP
EGFR: 78 ML/MIN/1.73M2 — SIGNIFICANT CHANGE UP
EOSINOPHIL # BLD AUTO: 0 K/UL — SIGNIFICANT CHANGE UP (ref 0–0.5)
EOSINOPHIL # BLD AUTO: 0 K/UL — SIGNIFICANT CHANGE UP (ref 0–0.5)
EOSINOPHIL NFR BLD AUTO: 0 % — SIGNIFICANT CHANGE UP (ref 0–6)
EOSINOPHIL NFR BLD AUTO: 0 % — SIGNIFICANT CHANGE UP (ref 0–6)
FIBRINOGEN PPP-MCNC: 222 MG/DL — SIGNIFICANT CHANGE UP (ref 200–435)
GLUCOSE SERPL-MCNC: 234 MG/DL — HIGH (ref 70–99)
HCT VFR BLD CALC: 23.5 % — LOW (ref 34.5–45)
HCT VFR BLD CALC: 24.2 % — LOW (ref 34.5–45)
HGB BLD-MCNC: 7.5 G/DL — LOW (ref 11.5–15.5)
HGB BLD-MCNC: 7.6 G/DL — LOW (ref 11.5–15.5)
IMM GRANULOCYTES # BLD AUTO: 0.06 K/UL — SIGNIFICANT CHANGE UP (ref 0–0.07)
IMM GRANULOCYTES # BLD AUTO: 0.12 K/UL — HIGH (ref 0–0.07)
IMM GRANULOCYTES NFR BLD AUTO: 0.9 % — SIGNIFICANT CHANGE UP (ref 0–0.9)
IMM GRANULOCYTES NFR BLD AUTO: 1.5 % — HIGH (ref 0–0.9)
IMMATURE PLATELET FRACTION #: 3 K/UL — LOW (ref 4.7–11.1)
IMMATURE PLATELET FRACTION #: 3 K/UL — LOW (ref 4.7–11.1)
IMMATURE PLATELET FRACTION %: 4.2 % — SIGNIFICANT CHANGE UP (ref 1.6–4.9)
IMMATURE PLATELET FRACTION %: 7.6 % — HIGH (ref 1.6–4.9)
INR BLD: 0.93 RATIO — SIGNIFICANT CHANGE UP (ref 0.65–1.3)
LYMPHOCYTES # BLD AUTO: 1.1 K/UL — SIGNIFICANT CHANGE UP (ref 1–3.3)
LYMPHOCYTES # BLD AUTO: 1.36 K/UL — SIGNIFICANT CHANGE UP (ref 1–3.3)
LYMPHOCYTES NFR BLD AUTO: 15.9 % — SIGNIFICANT CHANGE UP (ref 13–44)
LYMPHOCYTES NFR BLD AUTO: 17.4 % — SIGNIFICANT CHANGE UP (ref 13–44)
MAGNESIUM SERPL-MCNC: 2.2 MG/DL — SIGNIFICANT CHANGE UP (ref 1.8–2.4)
MCHC RBC-ENTMCNC: 28.9 PG — SIGNIFICANT CHANGE UP (ref 27–34)
MCHC RBC-ENTMCNC: 29.6 PG — SIGNIFICANT CHANGE UP (ref 27–34)
MCHC RBC-ENTMCNC: 31.4 G/DL — LOW (ref 32–36)
MCHC RBC-ENTMCNC: 31.9 G/DL — LOW (ref 32–36)
MCV RBC AUTO: 92 FL — SIGNIFICANT CHANGE UP (ref 80–100)
MCV RBC AUTO: 92.9 FL — SIGNIFICANT CHANGE UP (ref 80–100)
MONOCYTES # BLD AUTO: 0.4 K/UL — SIGNIFICANT CHANGE UP (ref 0–0.9)
MONOCYTES # BLD AUTO: 0.51 K/UL — SIGNIFICANT CHANGE UP (ref 0–0.9)
MONOCYTES NFR BLD AUTO: 5.1 % — SIGNIFICANT CHANGE UP (ref 2–14)
MONOCYTES NFR BLD AUTO: 7.4 % — SIGNIFICANT CHANGE UP (ref 2–14)
NEUTROPHILS # BLD AUTO: 5.23 K/UL — SIGNIFICANT CHANGE UP (ref 1.8–7.4)
NEUTROPHILS # BLD AUTO: 5.92 K/UL — SIGNIFICANT CHANGE UP (ref 1.8–7.4)
NEUTROPHILS NFR BLD AUTO: 75.6 % — SIGNIFICANT CHANGE UP (ref 43–77)
NEUTROPHILS NFR BLD AUTO: 75.7 % — SIGNIFICANT CHANGE UP (ref 43–77)
NRBC # BLD AUTO: 0 K/UL — SIGNIFICANT CHANGE UP (ref 0–0)
NRBC # BLD AUTO: 0.02 K/UL — HIGH (ref 0–0)
NRBC # FLD: 0 K/UL — SIGNIFICANT CHANGE UP (ref 0–0)
NRBC # FLD: 0.02 K/UL — HIGH (ref 0–0)
NRBC BLD AUTO-RTO: 0 /100 WBCS — SIGNIFICANT CHANGE UP (ref 0–0)
NRBC BLD AUTO-RTO: 0 /100 WBCS — SIGNIFICANT CHANGE UP (ref 0–0)
PLATELET # BLD AUTO: 39 K/UL — LOW (ref 150–400)
PLATELET # BLD AUTO: 72 K/UL — LOW (ref 150–400)
PMV BLD: 12 FL — SIGNIFICANT CHANGE UP (ref 7–13)
PMV BLD: 12 FL — SIGNIFICANT CHANGE UP (ref 7–13)
POTASSIUM SERPL-MCNC: 3.1 MMOL/L — LOW (ref 3.5–5)
POTASSIUM SERPL-SCNC: 3.1 MMOL/L — LOW (ref 3.5–5)
PROT SERPL-MCNC: 4.5 G/DL — LOW (ref 6–8)
PROTHROM AB SERPL-ACNC: 10.9 SEC — SIGNIFICANT CHANGE UP (ref 9.95–12.87)
RBC # BLD: 2.53 M/UL — LOW (ref 3.8–5.2)
RBC # BLD: 2.63 M/UL — LOW (ref 3.8–5.2)
RBC # FLD: 15.9 % — HIGH (ref 10.3–14.5)
RBC # FLD: 15.9 % — HIGH (ref 10.3–14.5)
SODIUM SERPL-SCNC: 139 MMOL/L — SIGNIFICANT CHANGE UP (ref 135–146)
SPECIMEN SOURCE: SIGNIFICANT CHANGE UP
SPECIMEN SOURCE: SIGNIFICANT CHANGE UP
WBC # BLD: 6.91 K/UL — SIGNIFICANT CHANGE UP (ref 3.8–10.5)
WBC # BLD: 7.83 K/UL — SIGNIFICANT CHANGE UP (ref 3.8–10.5)
WBC # FLD AUTO: 6.91 K/UL — SIGNIFICANT CHANGE UP (ref 3.8–10.5)
WBC # FLD AUTO: 7.83 K/UL — SIGNIFICANT CHANGE UP (ref 3.8–10.5)

## 2025-07-01 PROCEDURE — 71045 X-RAY EXAM CHEST 1 VIEW: CPT | Mod: 26

## 2025-07-01 PROCEDURE — 99233 SBSQ HOSP IP/OBS HIGH 50: CPT

## 2025-07-01 PROCEDURE — 99223 1ST HOSP IP/OBS HIGH 75: CPT

## 2025-07-01 RX ORDER — DILTIAZEM HYDROCHLORIDE 240 MG/1
30 TABLET, EXTENDED RELEASE ORAL EVERY 8 HOURS
Refills: 0 | Status: DISCONTINUED | OUTPATIENT
Start: 2025-07-01 | End: 2025-07-07

## 2025-07-01 RX ADMIN — DILTIAZEM HYDROCHLORIDE 30 MILLIGRAM(S): 240 TABLET, EXTENDED RELEASE ORAL at 20:08

## 2025-07-01 RX ADMIN — NAFCILLIN 200 GRAM(S): 2 INJECTION, SOLUTION INTRAVENOUS at 02:07

## 2025-07-01 RX ADMIN — SERTRALINE 50 MILLIGRAM(S): 100 TABLET, FILM COATED ORAL at 15:03

## 2025-07-01 RX ADMIN — NAFCILLIN 200 GRAM(S): 2 INJECTION, SOLUTION INTRAVENOUS at 14:45

## 2025-07-01 RX ADMIN — Medication 50 MILLIEQUIVALENT(S): at 10:48

## 2025-07-01 RX ADMIN — Medication 1 APPLICATION(S): at 05:30

## 2025-07-01 RX ADMIN — MEDPURA VITAMIN A AND D 1 APPLICATION(S): 95 OINTMENT TOPICAL at 05:48

## 2025-07-01 RX ADMIN — NAFCILLIN 200 GRAM(S): 2 INJECTION, SOLUTION INTRAVENOUS at 22:20

## 2025-07-01 RX ADMIN — NAFCILLIN 200 GRAM(S): 2 INJECTION, SOLUTION INTRAVENOUS at 05:30

## 2025-07-01 RX ADMIN — TIZANIDINE 4 MILLIGRAM(S): 4 TABLET ORAL at 22:21

## 2025-07-01 RX ADMIN — NAFCILLIN 200 GRAM(S): 2 INJECTION, SOLUTION INTRAVENOUS at 10:48

## 2025-07-01 RX ADMIN — MEDPURA VITAMIN A AND D 1 APPLICATION(S): 95 OINTMENT TOPICAL at 17:51

## 2025-07-01 RX ADMIN — INSULIN LISPRO 2: 100 INJECTION, SOLUTION INTRAVENOUS; SUBCUTANEOUS at 16:41

## 2025-07-01 RX ADMIN — Medication 1 APPLICATION(S): at 17:50

## 2025-07-01 RX ADMIN — INSULIN LISPRO 2: 100 INJECTION, SOLUTION INTRAVENOUS; SUBCUTANEOUS at 11:47

## 2025-07-01 RX ADMIN — INSULIN LISPRO 2: 100 INJECTION, SOLUTION INTRAVENOUS; SUBCUTANEOUS at 07:55

## 2025-07-01 RX ADMIN — NAFCILLIN 200 GRAM(S): 2 INJECTION, SOLUTION INTRAVENOUS at 17:51

## 2025-07-01 RX ADMIN — Medication 50 MILLIEQUIVALENT(S): at 09:26

## 2025-07-01 NOTE — CONSULT NOTE ADULT - CONSULT REASON
bacteremia
Pressure injury assessment and treatment recommendation
Infection
Afib C RVR
Thrombocytopenia   Pancreatic CA on Chemo
GOC

## 2025-07-01 NOTE — SWALLOW BEDSIDE ASSESSMENT ADULT - ADDITIONAL RECOMMENDATIONS
6/30 - Palliative care discussed the option of treating for comfort, and Pts son states he would like to give patient another 24 hours to see if there are any signs of improvement. 7/1 NGT scheduled. SLP to f/u.

## 2025-07-01 NOTE — PROGRESS NOTE ADULT - SUBJECTIVE AND OBJECTIVE BOX
HPI:   80-year-old female with past medical history of hypertension hyperlipidemia DVT on Eliquis recently being treated for pancreatic cancer on chemotherapy with chest port brought to ed for evaluation of the weakness. Hx taken from the patient  at bedside. States that for the past few days, patient has been very weak, dehydrated, not eating and drinking well, unable to walk and unable to take care of her self. Also reported that patient has been more sleepy recently and had a fall a few back.  said she normally has some dementia however the last 3 days, Patient was recently becoming more demented prior to the fall.  Patient complaining of diffuse body pain afterwards  (27 Jun 2025 15:17)    Interval history:  Patient lying in bed. No family present.     ITEMS NOT CHECKED ARE NOT PRESENT    SOCIAL HISTORY:   Significant other/partner[x ]  Children[ ]  Yarsani/Spirituality:  Substance hx:  [ ]   Tobacco hx:  [ ]   Alcohol hx: [ ]   Living Situation: [x ]Home  [ ]Long term care  [ ]Rehab [ ]Other  Home Services: [ ] HHA [ ] Visting RN [ ] Hospice  Occupation:  Home Opioid hx:  [x ] Y [ ] N   I-Stop Reference No: [id number]    PDI	Current Rx	Drug Type	Rx Written	Rx Dispensed	Drug	Quantity	Days Supply	Prescriber Name	Prescriber MARILYN #	Payment Method	Dispenser  A	Y	O	06/11/2025	06/15/2025	morphine sulf er 30 mg tablet	90	30	Shaikh Saloni	[id number]	Insurance	[facility] #[id number]  A	N	O	05/09/2025	05/14/2025	morphine sulf er 30 mg tablet	90	30	Saloni Vazquez	[id number]	Insurance	[facility] #[id number]  A	N	O	05/07/2025	05/10/2025	hydromorphone 4 mg tablet	120	30	Shaikh Saloni	[id number]	Insurance	[facility] #[id number]  A	N	O	04/11/2025	04/14/2025	morphine sulf er 30 mg tablet	90	30	Henna Pcikens A	[id number]	Insurance	[facility] #[id number]  A	N	O	04/07/2025	04/08/2025	hydromorphone 4 mg tablet	120	30	Saloni Vazquez	[id number]	Insurance	[facility] #[id number]    ADVANCE DIRECTIVES:    [ ] Full Code [x ] DNR  MOLST  [ ]  Living Will  [ ]   DECISION MAKER(s):  [ ] Health Care Proxy(s)  [ ] Surrogate(s)  [ ] Guardian           Name(s): Phone Number(s):    BASELINE (I)ADL(s) (prior to admission):  [place]: [ ]Total  [ ] Moderate [ ]Dependent      Allergies    No Known Allergies    Intolerances    MEDICATIONS  (STANDING):  bacitracin   Ointment 1 Application(s) Topical two times a day  chlorhexidine 2% Cloths 1 Application(s) Topical <User Schedule>  dextrose 5%. 1000 milliLiter(s) (50 mL/Hr) IV Continuous <Continuous>  dextrose 5%. 1000 milliLiter(s) (100 mL/Hr) IV Continuous <Continuous>  dextrose 50% Injectable 25 Gram(s) IV Push once  dextrose 50% Injectable 12.5 Gram(s) IV Push once  dextrose 50% Injectable 25 Gram(s) IV Push once  diltiazem Infusion 5 mG/Hr (5 mL/Hr) IV Continuous <Continuous>  glucagon  Injectable 1 milliGRAM(s) IntraMuscular once  insulin lispro (ADMELOG) corrective regimen sliding scale   SubCutaneous three times a day before meals  lactated ringers. 1000 milliLiter(s) (75 mL/Hr) IV Continuous <Continuous>  nafcillin  IVPB 2 Gram(s) IV Intermittent every 4 hours  pancrelipase  (CREON 12,000 Lipase Units) 2 Capsule(s) Oral three times a day with meals  pantoprazole    Tablet 40 milliGRAM(s) Oral before breakfast  sertraline 50 milliGRAM(s) Oral daily  tiZANidine 4 milliGRAM(s) Oral at bedtime  vitamin A & D Ointment 1 Application(s) Topical two times a day    MEDICATIONS  (PRN):  acetaminophen     Tablet .. 650 milliGRAM(s) Oral every 6 hours PRN Temp greater or equal to 38C (100.4F), Mild Pain (1 - 3)  dextrose Oral Gel 15 Gram(s) Oral once PRN Blood Glucose LESS THAN 70 milliGRAM(s)/deciliter  HYDROmorphone  Injectable 0.5 milliGRAM(s) IV Push every 3 hours PRN Severe Pain (7 - 10)  metoprolol tartrate Injectable 5 milliGRAM(s) IV Push every 6 hours PRN heart rate above 110        PRESENT SYMPTOMS: [x ]Unable to obtain due to poor mentation   Source if other than patient:  [ ]Family   [ ]Team     Pain: [ ]yes [ ]no  QOL impact -   Location -                    Aggravating factors -  Alleviating factors -   Quality -  Radiation -  Timing -   Severity (0-10 scale):  Minimal acceptable level (0-10 scale):     CPOT:  3  https://www.[facility].org/getattachment/oju53m32-8n7g-8b9m-[email or web address]-1914s5159l9x/Critical-Care-Pain-Observation-Tool-(CPOT)    PAIN AD Score:   http://geriatrictoolkit.[facility]/cog/painad.pdf     Dyspnea:                           [ ]None[ ]Mild [ ]Moderate [ ]Severe     Respiratory Distress Observation Scale (RDOS): 2  A score of 0 to 2 signifies little or no respiratory distress, 3 signifies mild distress, scores 4 to 6 indicate moderate distress, and scores greater than 7 signify severe distress  https://www.[facility].ca/sites/default/files/PDFS/062611-gxwmqlbflfm-rjlokart-edifanylzrj-kxjgf.pdf    Anxiety:                             [ ]None[ ]Mild [ ]Moderate [ ]Severe   Fatigue:                             [ ]None[ ]Mild [ ]Moderate [ ]Severe   Nausea:                             [ ]None[ ]Mild [ ]Moderate [ ]Severe   Loss of appetite:              [ ]None[ ]Mild [ ]Moderate [ ]Severe   Constipation:                    [ ]None[ ]Mild [ ]Moderate [ ]Severe    Other Symptoms:  [ ]All other review of systems negative     PHYSICAL EXAM:    GENERAL:  NAD   PULMONARY:  Non labored breathing  NEUROLOGIC: Does not respond to verbal stimuli  BEHAVIORAL/PSYCH:  Calm    Palliative Performance Status Version 2:      10   %    http://npcrc.org/files/news/palliative_performance_scale_ppsv2.pdf    LABS: I have reviewed daily labs, including                                   7.5    6.91  )-----------( 72       ( 01 Jul 2025 11:24 )             23.5       07-01    139  |  101  |  40[H]  ----------------------------<  234[H]  3.1[L]   |  22  |  0.8    Ca    9.1      01 Jul 2025 05:48  Mg     2.2     07-01    TPro  4.5[L]  /  Alb  1.8[L]  /  TBili  0.9  /  DBili  x   /  AST  20  /  ALT  6   /  AlkPhos  119[H]  07-01          PT/INR - ( 01 Jul 2025 11:24 )   PT: 10.90 sec;   INR: 0.93 ratio                 RADIOLOGY & ADDITIONAL STUDIES: I have independently reviewed new imaging, including  CXR 7/1: Essentially stable left basilar opacity/effusion.    PROTEIN CALORIE MALNUTRITION PRESENT: [ ]mild [ ]moderate [ ]severe [ ]underweight [ ]morbid obesity  https://www.andeal.org/vault/2440/web/files/ONC/Table_Clinical%20Characteristics%20to%20Document%20Malnutrition-White%20JV%20et%20al%202012.pdf    Height (cm): 172.7 (06-27-25 @ 18:21)  Weight (kg): 58.2 (06-27-25 @ 18:21)  BMI (kg/m2): 19.5 (06-27-25 @ 18:21)    [ ]PPSV2 < or = to 30% [ ]significant weight loss  [ ]poor nutritional intake  [ ]anasarca      [ ]Artificial Nutrition      Palliative Care Spiritual/Emotional Screening Tool Question  Severity (0-4):                    OR                    [ x] Unable to determine. Will assess at later time if appropriate.  Score of 2 or greater indicates recommendation of Chaplaincy and/or SW referral  Chaplaincy Referral: [ ] Yes [ ] Refused [ ] Following     Caregiver Douglas:  [ ] Yes [ ] No    OR    [x ] Unable to determine. Will assess at later time if appropriate.  Social Work Referral [ ]  Patient and Family Centered Care Referral [ ]    Anticipatory Grief Present: [ ] Yes [ ] No    OR     [ x] Unable to determine. Will assess at later time if appropriate.  Social Work Referral [ ]  Patient and Family Centered Care Referral [ ]    REFERRALS:   [ ]Chaplaincy  [ ]Hospice  [ ]Child Life  [ ]Social Work  [ ]Case management [ ]Holistic Therapy     Palliative care education provided to patient and/or family

## 2025-07-01 NOTE — PROGRESS NOTE ADULT - ASSESSMENT
IMPRESSION:    MSSA bacteremia- Unclear Source possible port  Multifocal Pneumonia   Thrombocytopenia   Anemia   Metabolic encephalopathy   Fall with Sacral bruising  CKD 3  Transaminitis  Pancreatic CA on chemo via left sided Chest wall port  Pulmonary Nodules  HO HTN  HO DVT       PLAN:      CNS: avoid sedation, CTH and C spine noted     HEENT: Oral care    PULMONARY:  HOB @ 45 degrees. Aspiration precautions, wean oxygen, CXR and Ct chest noted with PNA and possible metastatic spread, Incentive tomás     CARDIOVASCULAR: avoid volume overload, MAP adequate, f/u echo noted, cannot r/o tricuspid vegetation may need JOSE for eval, DC LR fluids, bolus as needed       GI: GI prophylaxis. Feeding after SLP eval      RENAL: Follow up lytes. Correct as needed, f/u UO, place Aguilera if retaining     INFECTIOUS DISEASE: Follow up cultures, procalcitonin, repeat until clearance, f/u MRSA nares and RVP negative, deescalate to Nafacillin, port removal planned today     HEMATOLOGICAL: Trend platelets, hold DVT PPX <50, US duplex noted, SCD, platelet transfusion today, Hb if <7, DIC panel       ENDOCRINE:  Follow up FS. Insulin protocol if needed.    MUSCULOSKELETAL: ambulate as tolerated    SDU   Pallative eval   Poor prognosis

## 2025-07-01 NOTE — CONSULT NOTE ADULT - CONSULT REQUESTED DATE/TIME
01-Jul-2025 12:47
28-Jun-2025 16:25
30-Jun-2025 10:53
30-Jun-2025 13:05
30-Jun-2025 15:01
30-Jun-2025 10:37

## 2025-07-01 NOTE — CHART NOTE - NSCHARTNOTEFT_GEN_A_CORE
Registered Dietitian Follow-Up     Patient Profile Reviewed                           Yes [X]   No []     Nutrition History Previously Obtained        Yes [X]  No []       Pertinent Information:     pt is 80 year old female with hx of HTN, DVT, dementia, DM (as per spouse)  pancreatic CA on chemo p/w weakness with decreased po for several days. pt admitted with AMS, sepsis POA, + MSSA, new onset afib. pt is DNR/DNI     failed SLP eval, NGT placed for EN   to decide on possible CMO       Diet order:   Diet, NPO with Tube Feed:   Tube Feeding Modality: Nasogastric  Glucerna 1.2 Papa (GLUCERNARTH)  Total Volume for 24 Hours (mL): 720  Bolus  Total Volume of Bolus (mL):  240  Tube Feed Frequency: Every 8 hours   Tube Feed Start Time: 14:00  Bolus Feed Rate (mL per Hour): 240   Bolus Feed Duration (in Hours): 1  Free Water Flush  Free Water Flush Instructions:  50 ml pre and post feed with additional flushes of 200 ml q 4 hrs (25 @ 14:10) [Active]          Anthropometrics:  Height (cm): 172.7 (25 @ 18:21)  Weight (kg): 58.2 (25 @ 18:21)  BMI (kg/m2): 19.5 (25 @ 18:21)  :     Daily Weight in k.8 (), Weight in k.9 ()  % Weight Change    MEDICATIONS  (STANDING):  bacitracin   Ointment 1 Application(s) Topical two times a day  chlorhexidine 2% Cloths 1 Application(s) Topical <User Schedule>  dextrose 5%. 1000 milliLiter(s) (50 mL/Hr) IV Continuous <Continuous>  diltiazem    Tablet 30 milliGRAM(s) Oral every 8 hours  glucagon  Injectable 1 milliGRAM(s) IntraMuscular once  insulin lispro (ADMELOG) corrective regimen sliding scale   SubCutaneous three times a day before meals  nafcillin  IVPB 2 Gram(s) IV Intermittent every 4 hours  pantoprazole    Tablet 40 milliGRAM(s) Oral before breakfast  sertraline 50 milliGRAM(s) Oral daily  tiZANidine 4 milliGRAM(s) Oral at bedtime  vitamin A & D Ointment 1 Application(s) Topical two times a day    MEDICATIONS  (PRN):  acetaminophen     Tablet .. 650 milliGRAM(s) Oral every 6 hours PRN Temp greater or equal to 38C (100.4F), Mild Pain (1 - 3)  dextrose Oral Gel 15 Gram(s) Oral once PRN Blood Glucose LESS THAN 70 milliGRAM(s)/deciliter  HYDROmorphone  Injectable 0.5 milliGRAM(s) IV Push every 3 hours PRN Severe Pain (7 - 10)  metoprolol tartrate Injectable 5 milliGRAM(s) IV Push every 6 hours PRN heart rate above 110    Pertinent Labs:  @ 05:48: Na 139, BUN 40[H], Cr 0.8, [H], K+ 3.1[L], Phos --, Mg 2.2, Alk Phos 119[H], ALT/SGPT 6, AST/SGOT 20, HbA1c --    Finger Sticks:  POCT Blood Glucose.: 217 mg/dL ( @ 16:15)  POCT Blood Glucose.: 243 mg/dL ( @ 11:42)  POCT Blood Glucose.: 249 mg/dL ( @ 07:29)    Physical Findings:  - Appearance: lethargic  - GI function: + BS, BM noted   - Tubes: NGT  - Oral/Mouth cavity:  - Skin: sacrum DTI, L heel suspected DTI  - Edema: generalized + 2, B/L UE + 3     Nutrition Requirements:  Weight Used: 58. 2 kgs      Estimated Energy Needs      5633-2017 kcals ( 25-30 kcal/kg/BW)  76-87g protein ( 1.3-1.5g/kg/BW)  1;1 kcal for estimated fluid needs      Nutrient Intake: present regimen provides pt with 864 kcals, 43g protein and 2076 ml water meeting 50% of estimated needs.    [] Previous Nutrition Diagnosis:            [X] Ongoing          [] Resolved    severe PCM remains    Goal/Expected Outcome: pt to tolerate EN and meet at least 80% of estimated needs within 4 days      Indicator/Monitoring: tolerance to EN, labs/meds, bowel fxn, NFPF    Recommendation:   -pt at increased risk for refeeding syndrome therefore EN regimen will be gradually increased to goal rate over 1-3 days  -If pt continues to tolerate feeds and EN remains within GOC on  increase feeds as tolerated to Glucerna 1.2, 360 ml q 8 hrs  with H20 flushes of 50 ml pre and post feed will provide pt with 1296 kcals, 64g protein and 1164 ml free water meeting 80% of estimated needs  -HIGH RISK

## 2025-07-01 NOTE — PROGRESS NOTE ADULT - SUBJECTIVE AND OBJECTIVE BOX
LINDA ONEAL  74y, Female    LOS  4d    INTERVAL EVENTS/HPI  - No acute events overnight  - WBC Count: 7.83 (07-01-25 @ 05:48)  WBC Count: 8.78 (06-30-25 @ 05:46)  - Creatinine: 0.8 (07-01-25 @ 05:48)  Creatinine: 1.0 (06-30-25 @ 05:46)     REVIEW OF SYSTEMS: cannot obtain further history from the patient secondary to altered mental status or sedation      ANTIMICROBIALS:   nafcillin  IVPB 2 every 4 hours      OTHER MEDS: MEDICATIONS  (STANDING):  acetaminophen     Tablet .. 650 every 6 hours PRN  dextrose 50% Injectable 25 once  dextrose 50% Injectable 12.5 once  dextrose 50% Injectable 25 once  dextrose Oral Gel 15 once PRN  diltiazem Infusion 5 <Continuous>  glucagon  Injectable 1 once  HYDROmorphone  Injectable 0.5 every 3 hours PRN  insulin lispro (ADMELOG) corrective regimen sliding scale  three times a day before meals  metoprolol tartrate Injectable 5 every 6 hours PRN  pancrelipase  (CREON 12,000 Lipase Units) 2 three times a day with meals  pantoprazole    Tablet 40 before breakfast  sertraline 50 daily  tiZANidine 4 at bedtime      Vital Signs Last 24 Hrs  T(F): 96.8 (07-01-25 @ 07:00), Max: 97.3 (06-30-25 @ 07:20)    Vital Signs Last 24 Hrs  HR: 86 (07-01-25 @ 06:32) (86 - 135)  BP: 113/63 (07-01-25 @ 06:10) (98/57 - 134/60)  RR: 27 (07-01-25 @ 06:32)  SpO2: 97% (07-01-25 @ 06:32) (95% - 100%)  Wt(kg): --    EXAM:  GENERAL: In NAD  HEAD: Left sided chest wall port scab noted.   NECK: Supple  CHEST/LUNG: Shallow breath sounds.   HEART: S1 S2  ABDOMEN: Soft, nontender  EXTREMITIES: No clubbing  NERVOUS SYSTEM: Awake. Confused  MSK: No joint erythema, swelling or pain    Labs:                        7.6    7.83  )-----------( 39       ( 01 Jul 2025 05:48 )             24.2     07-01    139  |  101  |  40[H]  ----------------------------<  234[H]  3.1[L]   |  22  |  0.8    Ca    9.1      01 Jul 2025 05:48  Mg     2.2     07-01    TPro  4.5[L]  /  Alb  1.8[L]  /  TBili  0.9  /  DBili  x   /  AST  20  /  ALT  6   /  AlkPhos  119[H]  07-01      WBC Trend:  WBC Count: 7.83 (07-01-25 @ 05:48)  WBC Count: 8.78 (06-30-25 @ 05:46)  WBC Count: 7.95 (06-29-25 @ 05:49)  WBC Count: 6.90 (06-28-25 @ 11:57)      Creatine Trend:  Creatinine: 0.8 (07-01)  Creatinine: 1.0 (06-30)  Creatinine: 1.2 (06-29)  Creatinine: 1.1 (06-28)      Liver Biochemical Testing Trend:  Alanine Aminotransferase (ALT/SGPT): 6 (07-01)  Alanine Aminotransferase (ALT/SGPT): 15 (06-30)  Alanine Aminotransferase (ALT/SGPT): 37 (06-29)  Alanine Aminotransferase (ALT/SGPT): 53 *H* (06-28)  Alanine Aminotransferase (ALT/SGPT): 68 *H* (06-28)  Aspartate Aminotransferase (AST/SGOT): 20 (07-01-25 @ 05:48)  Aspartate Aminotransferase (AST/SGOT): 24 (06-30-25 @ 05:46)  Aspartate Aminotransferase (AST/SGOT): 44 (06-29-25 @ 05:49)  Aspartate Aminotransferase (AST/SGOT): 75 (06-28-25 @ 16:04)  Aspartate Aminotransferase (AST/SGOT): 145 (06-28-25 @ 05:52)  Bilirubin Total: 0.9 (07-01)  Bilirubin Total: 0.6 (06-30)  Bilirubin Total: 0.6 (06-29)  Bilirubin Total: 0.6 (06-28)  Bilirubin Total: 0.6 (06-28)      Trend LDH      Urinalysis Basic - ( 01 Jul 2025 05:48 )    Color: x / Appearance: x / SG: x / pH: x  Gluc: 234 mg/dL / Ketone: x  / Bili: x / Urobili: x   Blood: x / Protein: x / Nitrite: x   Leuk Esterase: x / RBC: x / WBC x   Sq Epi: x / Non Sq Epi: x / Bacteria: x        MICROBIOLOGY:    Female    Culture - Blood (collected 29 Jun 2025 05:49)  Source: Blood None  Preliminary Report:    Growth in aerobic bottle: Gram Positive Cocci in Clusters    Growth in anaerobic bottle: Gram Positive Cocci in Clusters    Culture - Blood (collected 29 Jun 2025 05:49)  Source: Blood None  Preliminary Report:    Growth in aerobic bottle: Gram Positive Cocci in Clusters    Growth in anaerobic bottle: Gram Positive Cocci in Clusters    Urinalysis with Rflx Culture (collected 27 Jun 2025 10:20)    Culture - Urine (collected 27 Jun 2025 10:20)  Source: Clean Catch None  Final Report:    >100,000 CFU/ml Escherichia coli    <10,000 CFU/ml Normal Urogenital pee present  Organism: Escherichia coli  Organism: Escherichia coli    Sensitivities:      -  Levofloxacin: S <=0.5      -  Tobramycin: S <=2      -  Nitrofurantoin: S <=32 Should not be used to treat pyelonephritis      -  Aztreonam: S <=4      -  Gentamicin: S <=2      -  Cefazolin: S 8 For uncomplicated UTI with K. pneumoniae, E. coli, or P. mirablis: AUGUSTINE <=16 is sensitive and AUGUSTINE >=32 is resistant. This also predicts results for oral agents cefaclor, cefdinir, cefpodoxime, cefprozil, cefuroxime axetil, cephalexin and locarbef for uncomplicated UTI. Note that some isolates may be susceptible to these agents while testing resistant to cefazolin.      -  Cefepime: S <=2      -  Piperacillin/Tazobactam: R 32      -  Ciprofloxacin: S <=0.25      -  Imipenem: S <=1      -  Ceftriaxone: S <=1      -  Ampicillin: R >16 These ampicillin results predict results for amoxicillin      Method Type: AUGUSTINE      -  Meropenem: S <=1      -  Ampicillin/Sulbactam: R >16/8      -  Cefoxitin: S <=8      -  Cefuroxime: S 8      -  Amoxicillin/Clavulanic Acid: R >16/8      -  Trimethoprim/Sulfamethoxazole: R >2/38      -  Tigecycline: S <=2 Interpretations based on FDA breakpoints      -  Ertapenem: S <=0.5    Culture - Blood (collected 27 Jun 2025 09:30)  Source: Blood Blood-Peripheral  Final Report:    Growth in aerobic and anaerobic bottles: Staphylococcus aureus    See previous culture [id number]    Culture - Blood (collected 27 Jun 2025 09:30)  Source: Blood Blood-Peripheral  Final Report:    Growth in aerobic and anaerobic bottles: Staphylococcus aureus    Direct identification is available within approximately 3-5    hours either by Blood Panel Multiplexed PCR or Direct    MALDI-TOF. Details: [email or web address]  Organism: Blood Culture PCR  Staphylococcus aureus  Organism: Blood Culture PCR    Sensitivities:      Method Type: PCR      -  Methicillin SENSITIVE Staphylococcus aureus (MSSA): Detec Any isolate of Staphylococcus aureus from a blood culture is NOT considered a contaminant.  Organism: Staphylococcus aureus    Sensitivities:      -  Clindamycin: S <=0.25      -  Oxacillin: S 0.5 Oxacillin predicts susceptibility for dicloxacillin, methicillin, and nafcillin      -  Gentamicin: S <=4 Should not be used as monotherapy      -  Vancomycin: S 1      -  Tetracycline: S <=4      Method Type: AUGUSTINE      -  Penicillin: R >2      -  Rifampin: S <=1 Should not be used as monotherapy      -  Erythromycin: S <=0.25      -  Trimethoprim/Sulfamethoxazole: S <=0.5/9.5  Procalcitonin: 0.97 (06-27)        D-Dimer Assay, Quantitative: 863 (06-28)          Lactate, Blood: 2.4 (06-29 @ 15:44)  Lactate, Blood: 2.3 (06-29 @ 05:49)  Lactate, Blood: 2.2 (06-28 @ 16:04)  Lactate, Blood: 2.2 (06-28 @ 11:57)        RADIOLOGY & ADDITIONAL TESTS:  I have personally reviewed the relevant images.   CXR      CT    < from: Xray Chest 1 View- PORTABLE-Routine (06.30.25 @ 10:55) >    FINDINGS:    Support devices: Left-sided chest port with tip terminating in the region   of the superior vena cava, unchanged from prior.    Cardiac/mediastinum/hilum: Unchanged.    Lung parenchyma/Pleura: Left basilar opacity/effusion, slightly decreased   from prior.  No pneumothorax.    Skeleton/soft tissues: Unchanged.    IMPRESSION:    Left basilar opacity/effusion, slightly decreased from prior.    --- End of Report ---    < end of copied text >  < from: TTE Echo Complete w/o Contrast w/ Doppler (06.30.25 @ 11:35) >    CONCLUSIONS:     1. Left ventricular systolic function is normal with an ejection   fraction visually estimated at 55 to 60 %.   2. The left ventricular diastolic function is indeterminate.   3. Normal right ventricular cavity size, with normal wall thickness, and   normal right ventricular systolic function. Tricuspid annular tissue   Doppler S' is 25.3 cm/s (normal >10 cm/s).   4. Normal left and right atrial size.   5. Aortic valve was not well visualized.   6. There is mildcalcification of the mitral valve annulus.   7. Trace mitral regurgitation at a blood pressure of 118/57 mmHg.   8. Possible thickening of tricuspid valve. Cannot rule out vegetation.   9. Aortic root at the sinuses of Valsalva is dilated, measuring 3.80 cm   (indexed 2.25 cm/m²).  10. Estimated pulmonary artery systolic pressure is 29 mmHg, consistent   with normal pulmonary artery pressure.  11. Small pericardial effusion noted adjacent to the anterior right   ventricle with no echocardiographic evidence of tamponade physiology.    < end of copied text >      WEIGHT  Weight (kg): 58.2 (06-27-25 @ 18:21)  Creatinine: 0.8 mg/dL (07-01-25 @ 05:48)      All available historical records have been reviewed

## 2025-07-01 NOTE — CONSULT NOTE ADULT - NS ATTEND AMEND GEN_ALL_CORE FT
Agree with plan noted above    EKG reveals sinus rhythm with PACs.   Transition to Cardizem po.   TTE reviewed, normal LV function.   Sepsis plan as per primary team.
Agree with above A/P  Plan d/w pt's .  Prognosis poor
I saw and examined the patient.  The port appears in good condition and is working.  There is no surrounding erythema.  Labs and imaging reviewed.  Plan for port removal appropriate secure access is obtained. I spoke at length with the patient's .  It is unclear what exactly their goals of comfort care are at this time however it is certainly reasonable to treat a potential infection with removal of the [id number].

## 2025-07-01 NOTE — PROGRESS NOTE ADULT - SUBJECTIVE AND OBJECTIVE BOX
LINDA ONEAL 74y Female  MRN#: [id number]     Hospital Day: 4d      SUBJECTIVE  No acute events overnight, pt seen and examined this morning. remains very lethargic                                          ----------------------------------------------------------  OBJECTIVE  PAST MEDICAL & SURGICAL HISTORY                                            -----------------------------------------------------------  ALLERGIES:  No Known Allergies                                            ------------------------------------------------------------    HOME MEDICATIONS  Home Medications:  Claritin 10 mg oral tablet: 1 tab(s) orally once a day (27 Jun 2025 16:31)  Creon 24,000 units oral delayed release capsule: 1 cap(s) orally 3 times a day (27 Jun 2025 16:31)  docusate: 3 tab(s) orally once a day (27 Jun 2025 16:31)  Eliquis 5 mg oral tablet: 1 tab(s) orally 2 times a day (27 Jun 2025 16:31)  glimepiride 2 mg oral tablet: 1 tab(s) orally once a day (27 Jun 2025 16:31)  HYDROmorphone 4 mg oral tablet: 1 tab(s) orally every 6 hours as needed for  severe pain (27 Jun 2025 16:31)  lansoprazole 30 mg oral delayed release capsule: 1 cap(s) orally once a day (27 Jun 2025 16:31)  morphine 30 mg oral tablet: 1 tab(s) orally 3 times a day as needed for  moderate pain (27 Jun 2025 16:31)  sertraline 50 mg oral tablet: 1 tab(s) orally once a day (27 Jun 2025 16:31)  tiZANidine 4 mg oral capsule: 2 cap(s) orally once a day (at bedtime) (27 Jun 2025 16:31)                           MEDICATIONS:  STANDING MEDICATIONS  bacitracin   Ointment 1 Application(s) Topical two times a day  chlorhexidine 2% Cloths 1 Application(s) Topical <User Schedule>  dextrose 5%. 1000 milliLiter(s) IV Continuous <Continuous>  dextrose 5%. 1000 milliLiter(s) IV Continuous <Continuous>  dextrose 50% Injectable 25 Gram(s) IV Push once  dextrose 50% Injectable 12.5 Gram(s) IV Push once  dextrose 50% Injectable 25 Gram(s) IV Push once  diltiazem Infusion 5 mG/Hr IV Continuous <Continuous>  glucagon  Injectable 1 milliGRAM(s) IntraMuscular once  insulin lispro (ADMELOG) corrective regimen sliding scale   SubCutaneous three times a day before meals  lactated ringers. 1000 milliLiter(s) IV Continuous <Continuous>  nafcillin  IVPB 2 Gram(s) IV Intermittent every 4 hours  pancrelipase  (CREON 12,000 Lipase Units) 2 Capsule(s) Oral three times a day with meals  pantoprazole    Tablet 40 milliGRAM(s) Oral before breakfast  sertraline 50 milliGRAM(s) Oral daily  tiZANidine 4 milliGRAM(s) Oral at bedtime  vitamin A & D Ointment 1 Application(s) Topical two times a day    PRN MEDICATIONS  acetaminophen     Tablet .. 650 milliGRAM(s) Oral every 6 hours PRN  dextrose Oral Gel 15 Gram(s) Oral once PRN  HYDROmorphone  Injectable 0.5 milliGRAM(s) IV Push every 3 hours PRN  metoprolol tartrate Injectable 5 milliGRAM(s) IV Push every 6 hours PRN                                            ------------------------------------------------------------  VITAL SIGNS: Last 24 Hours  T(C): 36 (01 Jul 2025 07:00), Max: 36.1 (01 Jul 2025 06:10)  T(F): 96.8 (01 Jul 2025 07:00), Max: 97 (01 Jul 2025 06:10)  HR: 89 (01 Jul 2025 08:00) (86 - 135)  BP: 130/59 (01 Jul 2025 08:00) (98/57 - 134/60)  BP(mean): 81 (01 Jul 2025 08:00) (72 - 88)  RR: 26 (01 Jul 2025 08:00) (17 - 31)  SpO2: 99% (01 Jul 2025 08:00) (95% - 100%)      06-30-25 @ 07:01  -  07-01-25 @ 07:00  --------------------------------------------------------  IN: 1220 mL / OUT: 865 mL / NET: 355 mL                                             --------------------------------------------------------------  LABS:                        7.6    7.83  )-----------( 39       ( 01 Jul 2025 05:48 )             24.2     07-01    139  |  101  |  40[H]  ----------------------------<  234[H]  3.1[L]   |  22  |  0.8    Ca    9.1      01 Jul 2025 05:48  Mg     2.2     07-01    TPro  4.5[L]  /  Alb  1.8[L]  /  TBili  0.9  /  DBili  x   /  AST  20  /  ALT  6   /  AlkPhos  119[H]  07-01      Urinalysis Basic - ( 01 Jul 2025 05:48 )    Color: x / Appearance: x / SG: x / pH: x  Gluc: 234 mg/dL / Ketone: x  / Bili: x / Urobili: x   Blood: x / Protein: x / Nitrite: x   Leuk Esterase: x / RBC: x / WBC x   Sq Epi: x / Non Sq Epi: x / Bacteria: x              Culture - Blood (collected 29 Jun 2025 05:49)  Source: Blood None  Gram Stain (30 Jun 2025 12:29):    Growth in aerobic bottle: Gram Positive Cocci in Clusters    Growth in anaerobic bottle: Gram Positive Cocci in Clusters  Preliminary Report (30 Jun 2025 12:29):    Growth in aerobic bottle: Gram Positive Cocci in Clusters    Growth in anaerobic bottle: Gram Positive Cocci in Clusters    Culture - Blood (collected 29 Jun 2025 05:49)  Source: Blood None  Gram Stain (30 Jun 2025 12:29):    Growth in aerobic bottle: Gram Positive Cocci in Clusters    Growth in anaerobic bottle: Gram Positive Cocci in Clusters  Preliminary Report (30 Jun 2025 16:07):    Growth in aerobic bottle: Gram Positive Cocci in Clusters    Growth in anaerobic bottle: Gram Positive Cocci in Clusters                                                    -------------------------------------------------------------  RADIOLOGY:  CXR      CT                                            --------------------------------------------------------------    PHYSICAL EXAM:  GENERAL:  lying in bed, ill-appearing   CHEST/LUNG: decreased breath sounds  HEART: IRRegular   ABDOMEN: Soft, nontender, nondistended  EXTREMITIES:  mild le edema

## 2025-07-01 NOTE — PROGRESS NOTE ADULT - ASSESSMENT
80-year-old female with past medical history of hypertension hyperlipidemia DVT on Eliquis recently being treated for pancreatic cancer on chemotherapy with chest port brought to ed for evaluation of the weakness. Patient found to have bacteremia. Palliative care consulted to assist with GOC.     : Discussed with patient's spouse, Claudy, and introduced palliative care. He is aware that patient is very sick and states that he has been working on  arrangements. He is still trying to hold onto some hope that patient will be able to improve enough in order to receive further cancer therapy, but he acknowledges that that does not appear likely. We discussed the option of treating for comfort, and Claudy states he would like to give patient another 24 hours to see if there are any signs of improvement.     : Claudy hopes that patient will improve with port removal. No additional questions.     Plan:  - continue IV dilaudid 0.5mg q3h PRN for pain  - ongoing discussions pending clinical condition    Palliative care will continue to follow.   Please call [phone] with questions or concerns .     Reviewed documentation from: [facility], onc, surgery  _____________  Mainor Latif MD  Palliative Medicine  [facility]  [phone]

## 2025-07-01 NOTE — PROGRESS NOTE ADULT - ASSESSMENT
This is a 80-year-old female with past medical history of hypertension, hyperlipidemia, DVT being treated for pancreatic cancer on chemotherapy with chest port brought to ed for evaluation of the weakness ans s/p fall.    MSSA bacteremia- Unclear Source possible chemoport  Multifocal Pneumonia   Thrombocytopenia   Metabolic encephalopathy   Fall with Sacral bruising  CKD 3  Transaminitis  Pancreatic CA on chemo via left sided Chest wall port  Pulmonary Nodules  Afib RVR   HO HTN  HO DVT     -Bcx noted, MSSA, positive  and repeat  still positive  -repeat cultures in lab, repeat daily until two negatives  -ucx noted e. coli  - nafcillin per ID  -TTE noted, cannot r/o vegetation on tricuspid   -surgery to remove chemoport today, giving one unit plt as per heme/onc   -heme/onc apprecaited, very poor prognosis   -monitor plt's, suspect multifactorial in setting of active cancer pt on chemo as well as sepsis/bacteremia, continue to trend, give one unit today as above   -started on cardizem drip for RVR, controlled now wean off and switch to po now that has NGT   -GOC noted, palliative appreciated,  already thinking about  arrangements, is considering comfort care, palliative f/u   -dilaudid per palliative   -start NGT feeds if no plan for cmo yet   -prognosis very poor

## 2025-07-01 NOTE — PHYSICAL THERAPY INITIAL EVALUATION ADULT - SPECIFY REASON(S)
Attempted to see patient for bedside PT, pt asleep,  says she is not doing well today, will hold and continue to follow up.
Attempted to see Pt this PM for Bedside PT , Jasper Jean-Baptiste Notified to  Defer PT at this time 2/2 Pt lethargic and Not appropriate for Skill PT at this time, Will hold PT and  F/u as appropriated.
pt is lethargic

## 2025-07-01 NOTE — CONSULT NOTE ADULT - SUBJECTIVE AND OBJECTIVE BOX
HPI:  80-year-old female with past medical history of hypertension hyperlipidemia DVT on Eliquis recently being treated for pancreatic cancer on chemotherapy with chest port brought to ed for evaluation of the weakness. Hx taken from the patient  at bedside. States that for the past few days, patient has been very weak, dehydrated, not eating and drinking well, unable to walk and unable to take care of her self. Also reported that patient has been more sleepy recently and had a fall a few back.  said she normally has some dementia however the last 3 days, Patient was recently becoming more demented prior to the fall.  Patient complaining of diffuse body pain afterwards  (27 Jun 2025 15:17)      PAST MEDICAL & SURGICAL HISTORY      FAMILY HISTORY:  FAMILY HISTORY:      SOCIAL HISTORY:  []smoker  []Alcohol  []Drug    ALLERGIES:  No Known Allergies      MEDICATIONS:  MEDICATIONS  (STANDING):  bacitracin   Ointment 1 Application(s) Topical two times a day  chlorhexidine 2% Cloths 1 Application(s) Topical <User Schedule>  dextrose 5%. 1000 milliLiter(s) (50 mL/Hr) IV Continuous <Continuous>  dextrose 5%. 1000 milliLiter(s) (100 mL/Hr) IV Continuous <Continuous>  dextrose 50% Injectable 25 Gram(s) IV Push once  dextrose 50% Injectable 12.5 Gram(s) IV Push once  dextrose 50% Injectable 25 Gram(s) IV Push once  diltiazem Infusion 5 mG/Hr (5 mL/Hr) IV Continuous <Continuous>  glucagon  Injectable 1 milliGRAM(s) IntraMuscular once  insulin lispro (ADMELOG) corrective regimen sliding scale   SubCutaneous three times a day before meals  lactated ringers. 1000 milliLiter(s) (75 mL/Hr) IV Continuous <Continuous>  nafcillin  IVPB 2 Gram(s) IV Intermittent every 4 hours  pancrelipase  (CREON 12,000 Lipase Units) 2 Capsule(s) Oral three times a day with meals  pantoprazole    Tablet 40 milliGRAM(s) Oral before breakfast  sertraline 50 milliGRAM(s) Oral daily  tiZANidine 4 milliGRAM(s) Oral at bedtime  vitamin A & D Ointment 1 Application(s) Topical two times a day    MEDICATIONS  (PRN):  acetaminophen     Tablet .. 650 milliGRAM(s) Oral every 6 hours PRN Temp greater or equal to 38C (100.4F), Mild Pain (1 - 3)  dextrose Oral Gel 15 Gram(s) Oral once PRN Blood Glucose LESS THAN 70 milliGRAM(s)/deciliter  HYDROmorphone  Injectable 0.5 milliGRAM(s) IV Push every 3 hours PRN Severe Pain (7 - 10)  metoprolol tartrate Injectable 5 milliGRAM(s) IV Push every 6 hours PRN heart rate above 110      HOME MEDICATIONS:  Home Medications:  Claritin 10 mg oral tablet: 1 tab(s) orally once a day (27 Jun 2025 16:31)  Creon 24,000 units oral delayed release capsule: 1 cap(s) orally 3 times a day (27 Jun 2025 16:31)  docusate: 3 tab(s) orally once a day (27 Jun 2025 16:31)  Eliquis 5 mg oral tablet: 1 tab(s) orally 2 times a day (27 Jun 2025 16:31)  glimepiride 2 mg oral tablet: 1 tab(s) orally once a day (27 Jun 2025 16:31)  HYDROmorphone 4 mg oral tablet: 1 tab(s) orally every 6 hours as needed for  severe pain (27 Jun 2025 16:31)  lansoprazole 30 mg oral delayed release capsule: 1 cap(s) orally once a day (27 Jun 2025 16:31)  morphine 30 mg oral tablet: 1 tab(s) orally 3 times a day as needed for  moderate pain (27 Jun 2025 16:31)  sertraline 50 mg oral tablet: 1 tab(s) orally once a day (27 Jun 2025 16:31)  tiZANidine 4 mg oral capsule: 2 cap(s) orally once a day (at bedtime) (27 Jun 2025 16:31)      VITALS:   T(F): 96.8 (07-01 @ 07:00), Max: 97.3 (06-30 @ 07:20)  HR: 89 (07-01 @ 08:00) (86 - 151)  BP: 130/59 (07-01 @ 08:00) (84/46 - 153/80)  BP(mean): 81 (07-01 @ 08:00) (61 - 109)  RR: 26 (07-01 @ 08:00) (12 - 31)  SpO2: 99% (07-01 @ 08:00) (93% - 100%)    I&O's Summary    30 Jun 2025 07:01  -  01 Jul 2025 07:00  --------------------------------------------------------  IN: 1220 mL / OUT: 865 mL / NET: 355 mL        REVIEW OF SYSTEMS:  Unable to obtain    PHYSICAL EXAM:  NEURO: patient lethargic  GEN: Not in acute distress  NECK: no thyroid enlargement, no JVD  LUNGS: B/L BS  CARDIOVASCULAR: S1/S2 present, IRRR  ABD: Soft, non-tender, non-distended, +BS  EXT: No LE EDEMA  SKIN: Intact    LABS:                        7.5    6.91  )-----------( 72       ( 01 Jul 2025 11:24 )             23.5     07-01    139  |  101  |  40[H]  ----------------------------<  234[H]  3.1[L]   |  22  |  0.8    Ca    9.1      01 Jul 2025 05:48  Mg     2.2     07-01    TPro  4.5[L]  /  Alb  1.8[L]  /  TBili  0.9  /  DBili  x   /  AST  20  /  ALT  6   /  AlkPhos  119[H]  07-01      06-28 Chol 107 LDL -- HDL 19[L] Trig 240[H]      RADIOLOGY:  -CXR:  < from: Xray Chest 1 View-PORTABLE IMMEDIATE (Xray Chest 1 View-PORTABLE IMMEDIATE .) (07.01.25 @ 11:00) >    IMPRESSION:    Support devices as described.    Essentially stable left basilar opacity/effusion.    --- End of Report ---            FIDELIA PETER MD; Attending Radiologist  This document has been electronically signed. Jul 1 2025 12:20PM    < end of copied text >    -TTE:  < from: TTE Echo Complete w/o Contrast w/ Doppler (06.30.25 @ 11:35) >  CONCLUSIONS:     1. Left ventricular systolic function is normal with an ejection   fraction visually estimated at 55 to 60 %.   2. The left ventricular diastolic function is indeterminate.   3. Normal right ventricular cavity size, with normal wall thickness, and   normal right ventricular systolic function. Tricuspid annular tissue   Doppler S' is 25.3 cm/s (normal >10 cm/s).   4. Normal left and right atrial size.   5. Aortic valve was not well visualized.   6. There is mildcalcification of the mitral valve annulus.   7. Trace mitral regurgitation at a blood pressure of 118/57 mmHg.   8. Possible thickening of tricuspid valve. Cannot rule out vegetation.   9. Aortic root at the sinuses of Valsalva is dilated, measuring 3.80 cm   (indexed 2.25 cm/m²).  10. Estimated pulmonary artery systolic pressure is 29 mmHg, consistent   with normal pulmonary artery pressure.  11. Small pericardial effusion noted adjacent to the anterior right   ventricle with no echocardiographic evidence of tamponade physiology.      < end of copied text >    -CCTA:  < from: CT Chest w/ IV Cont (06.27.25 @ 13:24) >  IMPRESSION:  1.  No CT evidence of an acute traumatic intrathoracic or abdominopelvic   pathology.  2.  Patchy consolidative and nodular airspace opacities in the right   upper and lower lobes, suspicious for multifocal pneumonia in the   appropriate clinical setting. Follow-up is recommended.  3.  Few scattered bilateral solid pulmonary nodules. Metastatic disease   is a possibility. Follow-up per oncologic protocol.  4.  Diffuse pancreatic body/tail atrophy and ductal dilation with abrupt   transition at the level of the pancreatic head; suggestion of soft tissue   density surrounding the celiac and right hepatic artery, possibly tumor   encasement. Correlation with any available outside imaging is recommended.    --- End of Report ---            YO WILSON MD; Attending Radiologist  This document has been electronically signed. Jun 27 2025  1:59PM    < end of copied text >    ECG:  < from: 12 Lead ECG (06.28.25 @ 16:41) >  Diagnosis Line Sinus tachycardia with premature atrial complexes  Undetermined rhythm  Low voltage QRS  Incomplete right bundle branch block  Left anterior fascicular block  Possible Anterolateral infarct , age undetermined  Abnormal ECG    < end of copied text >    TELEMETRY EVENTS:  Sinus c PACs (HR 90s)    ASSESSMENT/ PLAN:  80-year-old female with past medical history of hypertension hyperlipidemia DVT on Eliquis recently being treated for pancreatic cancer on chemotherapy with chest port brought to ed for evaluation of the weakness.    OUTPATIENT CARDIOLOGIST:  Beatrice johnson Patient lethargic, in NAD    IMPRESSION:  #AFib c RVR-  Telemetry appears to be sinus c PACs- rate controlled at present  EKG: Sinus tachycardia with premature atrial complexes/ Incomplete right bundle branch block, Left anterior fascicular block, Possible Anterolateral infarct   TTE 6/30/2025: Normal LVSF, EF 55-60%, Small pericardial effusion noted adjacent to the anterior right   ventricle with no echocardiographic evidence of tamponade physiology.  Possible thickening of tricuspid valve. Cannot rule out vegetation.  #MSSA bacteremia- Unclear Source possible chemoport  #Multifocal Pneumonia   #Thrombocytopenia s/p platelets  #Pancreatic CA on chemo via left sided Chest wall port/ possible surgical removal today    **GOC conversation/ Palliative pending/ DNR/DNI   HPI:  80-year-old female with past medical history of hypertension hyperlipidemia DVT on Eliquis recently being treated for pancreatic cancer on chemotherapy with chest port brought to ed for evaluation of the weakness. Hx taken from the patient  at bedside. States that for the past few days, patient has been very weak, dehydrated, not eating and drinking well, unable to walk and unable to take care of her self. Also reported that patient has been more sleepy recently and had a fall a few back.  said she normally has some dementia however the last 3 days, Patient was recently becoming more demented prior to the fall.  Patient complaining of diffuse body pain afterwards  (27 Jun 2025 15:17)      PAST MEDICAL & SURGICAL HISTORY      FAMILY HISTORY:  FAMILY HISTORY:      SOCIAL HISTORY:  []smoker  []Alcohol  []Drug    ALLERGIES:  No Known Allergies      MEDICATIONS:  MEDICATIONS  (STANDING):  bacitracin   Ointment 1 Application(s) Topical two times a day  chlorhexidine 2% Cloths 1 Application(s) Topical <User Schedule>  dextrose 5%. 1000 milliLiter(s) (50 mL/Hr) IV Continuous <Continuous>  dextrose 5%. 1000 milliLiter(s) (100 mL/Hr) IV Continuous <Continuous>  dextrose 50% Injectable 25 Gram(s) IV Push once  dextrose 50% Injectable 12.5 Gram(s) IV Push once  dextrose 50% Injectable 25 Gram(s) IV Push once  diltiazem Infusion 5 mG/Hr (5 mL/Hr) IV Continuous <Continuous>  glucagon  Injectable 1 milliGRAM(s) IntraMuscular once  insulin lispro (ADMELOG) corrective regimen sliding scale   SubCutaneous three times a day before meals  lactated ringers. 1000 milliLiter(s) (75 mL/Hr) IV Continuous <Continuous>  nafcillin  IVPB 2 Gram(s) IV Intermittent every 4 hours  pancrelipase  (CREON 12,000 Lipase Units) 2 Capsule(s) Oral three times a day with meals  pantoprazole    Tablet 40 milliGRAM(s) Oral before breakfast  sertraline 50 milliGRAM(s) Oral daily  tiZANidine 4 milliGRAM(s) Oral at bedtime  vitamin A & D Ointment 1 Application(s) Topical two times a day    MEDICATIONS  (PRN):  acetaminophen     Tablet .. 650 milliGRAM(s) Oral every 6 hours PRN Temp greater or equal to 38C (100.4F), Mild Pain (1 - 3)  dextrose Oral Gel 15 Gram(s) Oral once PRN Blood Glucose LESS THAN 70 milliGRAM(s)/deciliter  HYDROmorphone  Injectable 0.5 milliGRAM(s) IV Push every 3 hours PRN Severe Pain (7 - 10)  metoprolol tartrate Injectable 5 milliGRAM(s) IV Push every 6 hours PRN heart rate above 110      HOME MEDICATIONS:  Home Medications:  Claritin 10 mg oral tablet: 1 tab(s) orally once a day (27 Jun 2025 16:31)  Creon 24,000 units oral delayed release capsule: 1 cap(s) orally 3 times a day (27 Jun 2025 16:31)  docusate: 3 tab(s) orally once a day (27 Jun 2025 16:31)  Eliquis 5 mg oral tablet: 1 tab(s) orally 2 times a day (27 Jun 2025 16:31)  glimepiride 2 mg oral tablet: 1 tab(s) orally once a day (27 Jun 2025 16:31)  HYDROmorphone 4 mg oral tablet: 1 tab(s) orally every 6 hours as needed for  severe pain (27 Jun 2025 16:31)  lansoprazole 30 mg oral delayed release capsule: 1 cap(s) orally once a day (27 Jun 2025 16:31)  morphine 30 mg oral tablet: 1 tab(s) orally 3 times a day as needed for  moderate pain (27 Jun 2025 16:31)  sertraline 50 mg oral tablet: 1 tab(s) orally once a day (27 Jun 2025 16:31)  tiZANidine 4 mg oral capsule: 2 cap(s) orally once a day (at bedtime) (27 Jun 2025 16:31)      VITALS:   T(F): 96.8 (07-01 @ 07:00), Max: 97.3 (06-30 @ 07:20)  HR: 89 (07-01 @ 08:00) (86 - 151)  BP: 130/59 (07-01 @ 08:00) (84/46 - 153/80)  BP(mean): 81 (07-01 @ 08:00) (61 - 109)  RR: 26 (07-01 @ 08:00) (12 - 31)  SpO2: 99% (07-01 @ 08:00) (93% - 100%)    I&O's Summary    30 Jun 2025 07:01  -  01 Jul 2025 07:00  --------------------------------------------------------  IN: 1220 mL / OUT: 865 mL / NET: 355 mL        REVIEW OF SYSTEMS:  Unable to obtain    PHYSICAL EXAM:  NEURO: patient lethargic  GEN: Not in acute distress  NECK: no thyroid enlargement, no JVD  LUNGS: B/L BS  CARDIOVASCULAR: S1/S2 present, IRRR  ABD: Soft, non-tender, non-distended, +BS  EXT: No LE EDEMA  SKIN: Intact    LABS:                        7.5    6.91  )-----------( 72       ( 01 Jul 2025 11:24 )             23.5     07-01    139  |  101  |  40[H]  ----------------------------<  234[H]  3.1[L]   |  22  |  0.8    Ca    9.1      01 Jul 2025 05:48  Mg     2.2     07-01    TPro  4.5[L]  /  Alb  1.8[L]  /  TBili  0.9  /  DBili  x   /  AST  20  /  ALT  6   /  AlkPhos  119[H]  07-01      06-28 Chol 107 LDL -- HDL 19[L] Trig 240[H]      RADIOLOGY:  -CXR:  < from: Xray Chest 1 View-PORTABLE IMMEDIATE (Xray Chest 1 View-PORTABLE IMMEDIATE .) (07.01.25 @ 11:00) >    IMPRESSION:    Support devices as described.    Essentially stable left basilar opacity/effusion.    --- End of Report ---            FIDELIA PETER MD; Attending Radiologist  This document has been electronically signed. Jul 1 2025 12:20PM    < end of copied text >    -TTE:  < from: TTE Echo Complete w/o Contrast w/ Doppler (06.30.25 @ 11:35) >  CONCLUSIONS:     1. Left ventricular systolic function is normal with an ejection   fraction visually estimated at 55 to 60 %.   2. The left ventricular diastolic function is indeterminate.   3. Normal right ventricular cavity size, with normal wall thickness, and   normal right ventricular systolic function. Tricuspid annular tissue   Doppler S' is 25.3 cm/s (normal >10 cm/s).   4. Normal left and right atrial size.   5. Aortic valve was not well visualized.   6. There is mildcalcification of the mitral valve annulus.   7. Trace mitral regurgitation at a blood pressure of 118/57 mmHg.   8. Possible thickening of tricuspid valve. Cannot rule out vegetation.   9. Aortic root at the sinuses of Valsalva is dilated, measuring 3.80 cm   (indexed 2.25 cm/m²).  10. Estimated pulmonary artery systolic pressure is 29 mmHg, consistent   with normal pulmonary artery pressure.  11. Small pericardial effusion noted adjacent to the anterior right   ventricle with no echocardiographic evidence of tamponade physiology.      < end of copied text >    -CCTA:  < from: CT Chest w/ IV Cont (06.27.25 @ 13:24) >  IMPRESSION:  1.  No CT evidence of an acute traumatic intrathoracic or abdominopelvic   pathology.  2.  Patchy consolidative and nodular airspace opacities in the right   upper and lower lobes, suspicious for multifocal pneumonia in the   appropriate clinical setting. Follow-up is recommended.  3.  Few scattered bilateral solid pulmonary nodules. Metastatic disease   is a possibility. Follow-up per oncologic protocol.  4.  Diffuse pancreatic body/tail atrophy and ductal dilation with abrupt   transition at the level of the pancreatic head; suggestion of soft tissue   density surrounding the celiac and right hepatic artery, possibly tumor   encasement. Correlation with any available outside imaging is recommended.    --- End of Report ---            YO WILSON MD; Attending Radiologist  This document has been electronically signed. Jun 27 2025  1:59PM    < end of copied text >    ECG:  < from: 12 Lead ECG (06.28.25 @ 16:41) >  Diagnosis Line Sinus tachycardia with premature atrial complexes  Undetermined rhythm  Low voltage QRS  Incomplete right bundle branch block  Left anterior fascicular block  Possible Anterolateral infarct , age undetermined  Abnormal ECG    < end of copied text >    TELEMETRY EVENTS:  Sinus c PACs (HR 90s)    ASSESSMENT/ PLAN:  80-year-old female with past medical history of hypertension hyperlipidemia DVT on Eliquis recently being treated for pancreatic cancer on chemotherapy with chest port brought to ed for evaluation of the weakness.    OUTPATIENT CARDIOLOGIST:  Beatrice johnson Patient lethargic, in NAD    IMPRESSION:  #MSSA bacteremia- Unclear Source possible chemoport  #Multifocal Pneumonia   #Thrombocytopenia s/p platelets  #Pancreatic CA on chemo via left sided Chest wall port/ possible surgical removal today#AFib c RVR-  Telemetry appears to be sinus c PACs- rate controlled at present- would transition to PO cardizem if continues to be rate controlled  EKG: Sinus tachycardia with premature atrial complexes/ Incomplete right bundle branch block, Left anterior fascicular block, Possible Anterolateral infarct   TTE 6/30/2025: Normal LVSF, EF 55-60%, Small pericardial effusion noted adjacent to the anterior right   ventricle with no echocardiographic evidence of tamponade physiology.  Possible thickening of tricuspid valve. Cannot rule out vegetation.    **GOC conversation/ Palliative pending/ DNR/DNI   HPI:  80-year-old female with past medical history of hypertension hyperlipidemia DVT on Eliquis recently being treated for pancreatic cancer on chemotherapy with chest port brought to ed for evaluation of the weakness. Hx taken from the patient  at bedside. States that for the past few days, patient has been very weak, dehydrated, not eating and drinking well, unable to walk and unable to take care of her self. Also reported that patient has been more sleepy recently and had a fall a few back.  said she normally has some dementia however the last 3 days, Patient was recently becoming more demented prior to the fall.  Patient complaining of diffuse body pain afterwards  (27 Jun 2025 15:17)      PAST MEDICAL & SURGICAL HISTORY      FAMILY HISTORY:  FAMILY HISTORY:      SOCIAL HISTORY:  []smoker  []Alcohol  []Drug    ALLERGIES:  No Known Allergies      MEDICATIONS:  MEDICATIONS  (STANDING):  bacitracin   Ointment 1 Application(s) Topical two times a day  chlorhexidine 2% Cloths 1 Application(s) Topical <User Schedule>  dextrose 5%. 1000 milliLiter(s) (50 mL/Hr) IV Continuous <Continuous>  dextrose 5%. 1000 milliLiter(s) (100 mL/Hr) IV Continuous <Continuous>  dextrose 50% Injectable 25 Gram(s) IV Push once  dextrose 50% Injectable 12.5 Gram(s) IV Push once  dextrose 50% Injectable 25 Gram(s) IV Push once  diltiazem Infusion 5 mG/Hr (5 mL/Hr) IV Continuous <Continuous>  glucagon  Injectable 1 milliGRAM(s) IntraMuscular once  insulin lispro (ADMELOG) corrective regimen sliding scale   SubCutaneous three times a day before meals  lactated ringers. 1000 milliLiter(s) (75 mL/Hr) IV Continuous <Continuous>  nafcillin  IVPB 2 Gram(s) IV Intermittent every 4 hours  pancrelipase  (CREON 12,000 Lipase Units) 2 Capsule(s) Oral three times a day with meals  pantoprazole    Tablet 40 milliGRAM(s) Oral before breakfast  sertraline 50 milliGRAM(s) Oral daily  tiZANidine 4 milliGRAM(s) Oral at bedtime  vitamin A & D Ointment 1 Application(s) Topical two times a day    MEDICATIONS  (PRN):  acetaminophen     Tablet .. 650 milliGRAM(s) Oral every 6 hours PRN Temp greater or equal to 38C (100.4F), Mild Pain (1 - 3)  dextrose Oral Gel 15 Gram(s) Oral once PRN Blood Glucose LESS THAN 70 milliGRAM(s)/deciliter  HYDROmorphone  Injectable 0.5 milliGRAM(s) IV Push every 3 hours PRN Severe Pain (7 - 10)  metoprolol tartrate Injectable 5 milliGRAM(s) IV Push every 6 hours PRN heart rate above 110      HOME MEDICATIONS:  Home Medications:  Claritin 10 mg oral tablet: 1 tab(s) orally once a day (27 Jun 2025 16:31)  Creon 24,000 units oral delayed release capsule: 1 cap(s) orally 3 times a day (27 Jun 2025 16:31)  docusate: 3 tab(s) orally once a day (27 Jun 2025 16:31)  Eliquis 5 mg oral tablet: 1 tab(s) orally 2 times a day (27 Jun 2025 16:31)  glimepiride 2 mg oral tablet: 1 tab(s) orally once a day (27 Jun 2025 16:31)  HYDROmorphone 4 mg oral tablet: 1 tab(s) orally every 6 hours as needed for  severe pain (27 Jun 2025 16:31)  lansoprazole 30 mg oral delayed release capsule: 1 cap(s) orally once a day (27 Jun 2025 16:31)  morphine 30 mg oral tablet: 1 tab(s) orally 3 times a day as needed for  moderate pain (27 Jun 2025 16:31)  sertraline 50 mg oral tablet: 1 tab(s) orally once a day (27 Jun 2025 16:31)  tiZANidine 4 mg oral capsule: 2 cap(s) orally once a day (at bedtime) (27 Jun 2025 16:31)      VITALS:   T(F): 96.8 (07-01 @ 07:00), Max: 97.3 (06-30 @ 07:20)  HR: 89 (07-01 @ 08:00) (86 - 151)  BP: 130/59 (07-01 @ 08:00) (84/46 - 153/80)  BP(mean): 81 (07-01 @ 08:00) (61 - 109)  RR: 26 (07-01 @ 08:00) (12 - 31)  SpO2: 99% (07-01 @ 08:00) (93% - 100%)    I&O's Summary    30 Jun 2025 07:01  -  01 Jul 2025 07:00  --------------------------------------------------------  IN: 1220 mL / OUT: 865 mL / NET: 355 mL        REVIEW OF SYSTEMS:  Unable to obtain    PHYSICAL EXAM:  NEURO: patient lethargic  GEN: Not in acute distress  NECK: no thyroid enlargement, no JVD  LUNGS: B/L BS  CARDIOVASCULAR: S1/S2 present, IRRR  ABD: Soft, non-tender, non-distended, +BS  EXT: No LE EDEMA  SKIN: Intact    LABS:                        7.5    6.91  )-----------( 72       ( 01 Jul 2025 11:24 )             23.5     07-01    139  |  101  |  40[H]  ----------------------------<  234[H]  3.1[L]   |  22  |  0.8    Ca    9.1      01 Jul 2025 05:48  Mg     2.2     07-01    TPro  4.5[L]  /  Alb  1.8[L]  /  TBili  0.9  /  DBili  x   /  AST  20  /  ALT  6   /  AlkPhos  119[H]  07-01      06-28 Chol 107 LDL -- HDL 19[L] Trig 240[H]      RADIOLOGY:  -CXR:  < from: Xray Chest 1 View-PORTABLE IMMEDIATE (Xray Chest 1 View-PORTABLE IMMEDIATE .) (07.01.25 @ 11:00) >    IMPRESSION:    Support devices as described.    Essentially stable left basilar opacity/effusion.    --- End of Report ---            FIDELIA PETER MD; Attending Radiologist  This document has been electronically signed. Jul 1 2025 12:20PM    < end of copied text >    -TTE:  < from: TTE Echo Complete w/o Contrast w/ Doppler (06.30.25 @ 11:35) >  CONCLUSIONS:     1. Left ventricular systolic function is normal with an ejection   fraction visually estimated at 55 to 60 %.   2. The left ventricular diastolic function is indeterminate.   3. Normal right ventricular cavity size, with normal wall thickness, and   normal right ventricular systolic function. Tricuspid annular tissue   Doppler S' is 25.3 cm/s (normal >10 cm/s).   4. Normal left and right atrial size.   5. Aortic valve was not well visualized.   6. There is mildcalcification of the mitral valve annulus.   7. Trace mitral regurgitation at a blood pressure of 118/57 mmHg.   8. Possible thickening of tricuspid valve. Cannot rule out vegetation.   9. Aortic root at the sinuses of Valsalva is dilated, measuring 3.80 cm   (indexed 2.25 cm/m²).  10. Estimated pulmonary artery systolic pressure is 29 mmHg, consistent   with normal pulmonary artery pressure.  11. Small pericardial effusion noted adjacent to the anterior right   ventricle with no echocardiographic evidence of tamponade physiology.      < end of copied text >    -CCTA:  < from: CT Chest w/ IV Cont (06.27.25 @ 13:24) >  IMPRESSION:  1.  No CT evidence of an acute traumatic intrathoracic or abdominopelvic   pathology.  2.  Patchy consolidative and nodular airspace opacities in the right   upper and lower lobes, suspicious for multifocal pneumonia in the   appropriate clinical setting. Follow-up is recommended.  3.  Few scattered bilateral solid pulmonary nodules. Metastatic disease   is a possibility. Follow-up per oncologic protocol.  4.  Diffuse pancreatic body/tail atrophy and ductal dilation with abrupt   transition at the level of the pancreatic head; suggestion of soft tissue   density surrounding the celiac and right hepatic artery, possibly tumor   encasement. Correlation with any available outside imaging is recommended.    --- End of Report ---            YO WILSON MD; Attending Radiologist  This document has been electronically signed. Jun 27 2025  1:59PM    < end of copied text >    ECG:  < from: 12 Lead ECG (06.28.25 @ 16:41) >  Diagnosis Line Sinus tachycardia with premature atrial complexes  Undetermined rhythm  Low voltage QRS  Incomplete right bundle branch block  Left anterior fascicular block  Possible Anterolateral infarct , age undetermined  Abnormal ECG    < end of copied text >    TELEMETRY EVENTS:  Sinus c PACs (HR 90s)    ASSESSMENT/ PLAN:  80-year-old female with past medical history of hypertension hyperlipidemia DVT on Eliquis recently being treated for pancreatic cancer on chemotherapy with chest port brought to ed for evaluation of the weakness.    OUTPATIENT CARDIOLOGIST:  Beatrice johnson Patient lethargic, in NAD    IMPRESSION:  #MSSA bacteremia- Unclear Source possible chemoport  #Multifocal Pneumonia   #Thrombocytopenia s/p platelets  #Pancreatic CA on chemo via left sided Chest wall port/ possible surgical removal today#AFib c RVR-  Telemetry appears to be sinus c PACs- rate controlled at present- would transition to PO cardizem if continues to be rate controlled  EKG: Sinus tachycardia with premature atrial complexes/ Incomplete right bundle branch block, Left anterior fascicular block, Possible Anterolateral infarct   TTE 6/30/2025: Normal LVSF, EF 55-60%, Small pericardial effusion noted adjacent to the anterior right   ventricle with no echocardiographic evidence of tamponade physiology.  Possible thickening of tricuspid valve. Cannot rule out vegetation.       HPI:  80-year-old female with past medical history of hypertension hyperlipidemia DVT on Eliquis recently being treated for pancreatic cancer on chemotherapy with chest port brought to ed for evaluation of the weakness. Hx taken from the patient  at bedside. States that for the past few days, patient has been very weak, dehydrated, not eating and drinking well, unable to walk and unable to take care of her self. Also reported that patient has been more sleepy recently and had a fall a few back.  said she normally has some dementia however the last 3 days, Patient was recently becoming more demented prior to the fall.  Patient complaining of diffuse body pain afterwards  (27 Jun 2025 15:17)      PAST MEDICAL & SURGICAL HISTORY      FAMILY HISTORY:  FAMILY HISTORY:      SOCIAL HISTORY:  []smoker  []Alcohol  []Drug    ALLERGIES:  No Known Allergies      MEDICATIONS:  MEDICATIONS  (STANDING):  bacitracin   Ointment 1 Application(s) Topical two times a day  chlorhexidine 2% Cloths 1 Application(s) Topical <User Schedule>  dextrose 5%. 1000 milliLiter(s) (50 mL/Hr) IV Continuous <Continuous>  dextrose 5%. 1000 milliLiter(s) (100 mL/Hr) IV Continuous <Continuous>  dextrose 50% Injectable 25 Gram(s) IV Push once  dextrose 50% Injectable 12.5 Gram(s) IV Push once  dextrose 50% Injectable 25 Gram(s) IV Push once  diltiazem Infusion 5 mG/Hr (5 mL/Hr) IV Continuous <Continuous>  glucagon  Injectable 1 milliGRAM(s) IntraMuscular once  insulin lispro (ADMELOG) corrective regimen sliding scale   SubCutaneous three times a day before meals  lactated ringers. 1000 milliLiter(s) (75 mL/Hr) IV Continuous <Continuous>  nafcillin  IVPB 2 Gram(s) IV Intermittent every 4 hours  pancrelipase  (CREON 12,000 Lipase Units) 2 Capsule(s) Oral three times a day with meals  pantoprazole    Tablet 40 milliGRAM(s) Oral before breakfast  sertraline 50 milliGRAM(s) Oral daily  tiZANidine 4 milliGRAM(s) Oral at bedtime  vitamin A & D Ointment 1 Application(s) Topical two times a day    MEDICATIONS  (PRN):  acetaminophen     Tablet .. 650 milliGRAM(s) Oral every 6 hours PRN Temp greater or equal to 38C (100.4F), Mild Pain (1 - 3)  dextrose Oral Gel 15 Gram(s) Oral once PRN Blood Glucose LESS THAN 70 milliGRAM(s)/deciliter  HYDROmorphone  Injectable 0.5 milliGRAM(s) IV Push every 3 hours PRN Severe Pain (7 - 10)  metoprolol tartrate Injectable 5 milliGRAM(s) IV Push every 6 hours PRN heart rate above 110      HOME MEDICATIONS:  Home Medications:  Claritin 10 mg oral tablet: 1 tab(s) orally once a day (27 Jun 2025 16:31)  Creon 24,000 units oral delayed release capsule: 1 cap(s) orally 3 times a day (27 Jun 2025 16:31)  docusate: 3 tab(s) orally once a day (27 Jun 2025 16:31)  Eliquis 5 mg oral tablet: 1 tab(s) orally 2 times a day (27 Jun 2025 16:31)  glimepiride 2 mg oral tablet: 1 tab(s) orally once a day (27 Jun 2025 16:31)  HYDROmorphone 4 mg oral tablet: 1 tab(s) orally every 6 hours as needed for  severe pain (27 Jun 2025 16:31)  lansoprazole 30 mg oral delayed release capsule: 1 cap(s) orally once a day (27 Jun 2025 16:31)  morphine 30 mg oral tablet: 1 tab(s) orally 3 times a day as needed for  moderate pain (27 Jun 2025 16:31)  sertraline 50 mg oral tablet: 1 tab(s) orally once a day (27 Jun 2025 16:31)  tiZANidine 4 mg oral capsule: 2 cap(s) orally once a day (at bedtime) (27 Jun 2025 16:31)      VITALS:   T(F): 96.8 (07-01 @ 07:00), Max: 97.3 (06-30 @ 07:20)  HR: 89 (07-01 @ 08:00) (86 - 151)  BP: 130/59 (07-01 @ 08:00) (84/46 - 153/80)  BP(mean): 81 (07-01 @ 08:00) (61 - 109)  RR: 26 (07-01 @ 08:00) (12 - 31)  SpO2: 99% (07-01 @ 08:00) (93% - 100%)    I&O's Summary    30 Jun 2025 07:01  -  01 Jul 2025 07:00  --------------------------------------------------------  IN: 1220 mL / OUT: 865 mL / NET: 355 mL        REVIEW OF SYSTEMS:  Unable to obtain    PHYSICAL EXAM:  NEURO: patient lethargic  GEN: Not in acute distress  NECK: no thyroid enlargement, no JVD  LUNGS: B/L BS  CARDIOVASCULAR: S1/S2 present, IRRR  ABD: Soft, non-tender, non-distended, +BS  EXT: No LE EDEMA  SKIN: Intact    LABS:                        7.5    6.91  )-----------( 72       ( 01 Jul 2025 11:24 )             23.5     07-01    139  |  101  |  40[H]  ----------------------------<  234[H]  3.1[L]   |  22  |  0.8    Ca    9.1      01 Jul 2025 05:48  Mg     2.2     07-01    TPro  4.5[L]  /  Alb  1.8[L]  /  TBili  0.9  /  DBili  x   /  AST  20  /  ALT  6   /  AlkPhos  119[H]  07-01      06-28 Chol 107 LDL -- HDL 19[L] Trig 240[H]      RADIOLOGY:  -CXR:  < from: Xray Chest 1 View-PORTABLE IMMEDIATE (Xray Chest 1 View-PORTABLE IMMEDIATE .) (07.01.25 @ 11:00) >    IMPRESSION:    Support devices as described.    Essentially stable left basilar opacity/effusion.    --- End of Report ---            FIDELIA PETER MD; Attending Radiologist  This document has been electronically signed. Jul 1 2025 12:20PM    < end of copied text >    -TTE:  < from: TTE Echo Complete w/o Contrast w/ Doppler (06.30.25 @ 11:35) >  CONCLUSIONS:     1. Left ventricular systolic function is normal with an ejection   fraction visually estimated at 55 to 60 %.   2. The left ventricular diastolic function is indeterminate.   3. Normal right ventricular cavity size, with normal wall thickness, and   normal right ventricular systolic function. Tricuspid annular tissue   Doppler S' is 25.3 cm/s (normal >10 cm/s).   4. Normal left and right atrial size.   5. Aortic valve was not well visualized.   6. There is mildcalcification of the mitral valve annulus.   7. Trace mitral regurgitation at a blood pressure of 118/57 mmHg.   8. Possible thickening of tricuspid valve. Cannot rule out vegetation.   9. Aortic root at the sinuses of Valsalva is dilated, measuring 3.80 cm   (indexed 2.25 cm/m²).  10. Estimated pulmonary artery systolic pressure is 29 mmHg, consistent   with normal pulmonary artery pressure.  11. Small pericardial effusion noted adjacent to the anterior right   ventricle with no echocardiographic evidence of tamponade physiology.      < end of copied text >    -CCTA:  < from: CT Chest w/ IV Cont (06.27.25 @ 13:24) >  IMPRESSION:  1.  No CT evidence of an acute traumatic intrathoracic or abdominopelvic   pathology.  2.  Patchy consolidative and nodular airspace opacities in the right   upper and lower lobes, suspicious for multifocal pneumonia in the   appropriate clinical setting. Follow-up is recommended.  3.  Few scattered bilateral solid pulmonary nodules. Metastatic disease   is a possibility. Follow-up per oncologic protocol.  4.  Diffuse pancreatic body/tail atrophy and ductal dilation with abrupt   transition at the level of the pancreatic head; suggestion of soft tissue   density surrounding the celiac and right hepatic artery, possibly tumor   encasement. Correlation with any available outside imaging is recommended.    --- End of Report ---            YO WILSON MD; Attending Radiologist  This document has been electronically signed. Jun 27 2025  1:59PM    < end of copied text >    ECG:  < from: 12 Lead ECG (06.28.25 @ 16:41) >  Diagnosis Line Sinus tachycardia with premature atrial complexes  Undetermined rhythm  Low voltage QRS  Incomplete right bundle branch block  Left anterior fascicular block  Possible Anterolateral infarct , age undetermined  Abnormal ECG    < end of copied text >    TELEMETRY EVENTS:  Sinus c PACs (HR 90s)    ASSESSMENT/ PLAN:  80-year-old female with past medical history of hypertension hyperlipidemia DVT on Eliquis recently being treated for pancreatic cancer on chemotherapy with chest port brought to ed for evaluation of the weakness.    OUTPATIENT CARDIOLOGIST:  Beatrice johnson Patient lethargic, in NAD    IMPRESSION:  #MSSA bacteremia- Unclear Source possible chemoport  #Multifocal Pneumonia   #Thrombocytopenia s/p platelets  #Pancreatic CA on chemo via left sided Chest wall port/ possible surgical removal today  #AFib c RVR-  Telemetry appears to be sinus c PACs- rate controlled at present- would transition to PO cardizem if continues to be rate controlled  EKG: Sinus tachycardia with premature atrial complexes/ Incomplete right bundle branch block, Left anterior fascicular block, Possible Anterolateral infarct   TTE 6/30/2025: Normal LVSF, EF 55-60%, Small pericardial effusion noted adjacent to the anterior right   ventricle with no echocardiographic evidence of tamponade physiology.  Possible thickening of tricuspid valve. Cannot rule out vegetation.

## 2025-07-01 NOTE — PROGRESS NOTE ADULT - SUBJECTIVE AND OBJECTIVE BOX
24H events:    Patient is a 74y old Female who presents with a chief complaint of weakness (30 Jun 2025 15:00)    Primary diagnosis of Sepsis due to pneumonia      Today is 4d of hospitalization. This morning patient was seen and examined at bedside, resting comfortably in bed.    No acute or major events overnight.    PAST MEDICAL & SURGICAL HISTORY    SOCIAL HISTORY:  Social History:      ALLERGIES:  No Known Allergies    MEDICATIONS:  STANDING MEDICATIONS  bacitracin   Ointment 1 Application(s) Topical two times a day  chlorhexidine 2% Cloths 1 Application(s) Topical <User Schedule>  diltiazem Infusion 5 mG/Hr IV Continuous <Continuous>  glucagon  Injectable 1 milliGRAM(s) IntraMuscular once  insulin lispro (ADMELOG) corrective regimen sliding scale   SubCutaneous three times a day before meals  lactated ringers. 1000 milliLiter(s) IV Continuous <Continuous>  nafcillin  IVPB 2 Gram(s) IV Intermittent every 4 hours  pancrelipase  (CREON 12,000 Lipase Units) 2 Capsule(s) Oral three times a day with meals  pantoprazole    Tablet 40 milliGRAM(s) Oral before breakfast  sertraline 50 milliGRAM(s) Oral daily  tiZANidine 4 milliGRAM(s) Oral at bedtime  vitamin A & D Ointment 1 Application(s) Topical two times a day    PRN MEDICATIONS  acetaminophen     Tablet .. 650 milliGRAM(s) Oral every 6 hours PRN  dextrose Oral Gel 15 Gram(s) Oral once PRN  HYDROmorphone  Injectable 0.5 milliGRAM(s) IV Push every 3 hours PRN  metoprolol tartrate Injectable 5 milliGRAM(s) IV Push every 6 hours PRN    VITALS:   T(F): 96.8  HR: 86  BP: 121/61  RR: 18  SpO2: 100%    PHYSICAL EXAM:  GENERAL: ill-appearing  HEAD:  Atraumatic, normocephalic  EYES: EOMI, PERRLA, conjunctiva and sclera clear  NECK: Supple, no JVD  HEART: Regular rate and rhythm, no murmurs, rubs, or gallops  LUNGS: diminished bs  ABDOMEN: Soft, nontender, nondistended, +BS  EXTREMITIES: bilaterally. No clubbing, cyanosis, or edema  NERVOUS SYSTEM:  A&Ox0,     LABS:                        8.7    8.78  )-----------( 29       ( 30 Jun 2025 05:46 )             26.7     06-30    137  |  101  |  51[H]  ----------------------------<  185[H]  3.6   |  20  |  1.0    Ca    9.4      30 Jun 2025 05:46  Phos  2.6     06-29  Mg     2.4     06-30    TPro  4.4[L]  /  Alb  1.8[L]  /  TBili  0.6  /  DBili  x   /  AST  24  /  ALT  15  /  AlkPhos  132[H]  06-30      Urinalysis Basic - ( 30 Jun 2025 05:46 )    Color: x / Appearance: x / SG: x / pH: x  Gluc: 185 mg/dL / Ketone: x  / Bili: x / Urobili: x   Blood: x / Protein: x / Nitrite: x   Leuk Esterase: x / RBC: x / WBC x   Sq Epi: x / Non Sq Epi: x / Bacteria: x            Culture - Blood (collected 29 Jun 2025 05:49)  Source: Blood None  Gram Stain (30 Jun 2025 12:29):    Growth in aerobic bottle: Gram Positive Cocci in Clusters    Growth in anaerobic bottle: Gram Positive Cocci in Clusters  Preliminary Report (30 Jun 2025 12:29):    Growth in aerobic bottle: Gram Positive Cocci in Clusters    Growth in anaerobic bottle: Gram Positive Cocci in Clusters    Culture - Blood (collected 29 Jun 2025 05:49)  Source: Blood None  Gram Stain (30 Jun 2025 12:29):    Growth in aerobic bottle: Gram Positive Cocci in Clusters    Growth in anaerobic bottle: Gram Positive Cocci in Clusters  Preliminary Report (30 Jun 2025 16:07):    Growth in aerobic bottle: Gram Positive Cocci in Clusters    Growth in anaerobic bottle: Gram Positive Cocci in Clusters          RADIOLOGY:    ASSESSMENT AND PLAN  LINDA ONEAL is a [id number] Female with a history significant for    MSSA bacteremia- Unclear Source possible port  Multifocal Pneumonia   Thrombocytopenia   Anemia   Metabolic encephalopathy   Fall with Sacral bruising  CKD 3  Transaminitis  Pancreatic CA on chemo via left sided Chest wall port  Pulmonary Nodules  HO HTN  HO DVT       PLAN:      CNS: avoid sedation, CTH and C spine noted     HEENT: Oral care    PULMONARY:  HOB @ 45 degrees. Aspiration precautions, wean oxygen, CXR and Ct chest noted with PNA and possible metastatic spread, Incentive tomás     CARDIOVASCULAR: avoid volume overload, MAP adequate, f/u echo noted, cannot r/o tricuspid vegetation may need JOSE for eval, DC LR fluids, bolus as needed       GI: GI prophylaxis. Feeding after SLP eval      RENAL: Follow up lytes. Correct as needed, f/u UO, place Aguilera if retaining     INFECTIOUS DISEASE: Follow up cultures, procalcitonin, repeat until clearance, f/u MRSA nares and RVP negative, deescalate to Nafacillin, port removal planned today     HEMATOLOGICAL: Trend platelets, hold DVT PPX <50, US duplex noted, SCD, platelet transfusion today, Hb if <7, DIC panel       ENDOCRINE:  Follow up FS. Insulin protocol if needed.    MUSCULOSKELETAL: ambulate as tolerated    SDU   Pallative eval   Poor prognosis

## 2025-07-01 NOTE — PROGRESS NOTE ADULT - SUBJECTIVE AND OBJECTIVE BOX
Patient is a 74y old  Female who presents with a chief complaint of weakness (30 Jun 2025 19:12)        Over Night Events: No acute events, on 3LNC         ROS:     All ROS are negative except HPI         PHYSICAL EXAM    ICU Vital Signs Last 24 Hrs  T(C): 36.1 (01 Jul 2025 06:10), Max: 36.1 (01 Jul 2025 06:10)  T(F): 97 (01 Jul 2025 06:10), Max: 97 (01 Jul 2025 06:10)  HR: 86 (01 Jul 2025 06:32) (86 - 135)  BP: 113/63 (01 Jul 2025 06:10) (98/57 - 134/60)  BP(mean): 83 (01 Jul 2025 06:10) (72 - 88)  ABP: --  ABP(mean): --  RR: 27 (01 Jul 2025 06:32) (17 - 31)  SpO2: 97% (01 Jul 2025 06:32) (95% - 100%)    O2 Parameters below as of 01 Jul 2025 06:32  Patient On (Oxygen Delivery Method): nasal cannula  O2 Flow (L/min): 3      CONSTITUTIONAL:  NAD    ENT:   Airway patent,   Mouth with normal mucosa.     EYES:   Pupils equal,   Round and reactive to light.    CARDIAC:   Normal rate,   Regular rhythm.      Vascular:  Normal systolic impulse  No Carotid bruits    RESPIRATORY:   No wheezing  Bilateral BS    GASTROINTESTINAL:  Abdomen soft,   Non-tender,     MUSCULOSKELETAL:   No clubbing, cyanosis    NEUROLOGICAL:   Alert and oriented x0     SKIN:   Skin normal color for race,   Warm and dry and intact.     06-30-25 @ 07:01  -  07-01-25 @ 07:00  --------------------------------------------------------  IN:    Diltiazem: 95 mL    IV PiggyBack: 300 mL    Lactated Ringers: 825 mL  Total IN: 1220 mL    OUT:    Indwelling Catheter - Urethral (mL): 600 mL    Intermittent Catheterization - Urethral (mL): 265 mL    Stool (mL): 0 mL  Total OUT: 865 mL    Total NET: 355 mL          LABS:                            7.6    7.83  )-----------( 39       ( 01 Jul 2025 05:48 )             24.2                                               06-30    137  |  101  |  51[H]  ----------------------------<  185[H]  3.6   |  20  |  1.0    Ca    9.4      30 Jun 2025 05:46  Mg     2.2     07-01    TPro  4.4[L]  /  Alb  1.8[L]  /  TBili  0.6  /  DBili  x   /  AST  24  /  ALT  15  /  AlkPhos  132[H]  06-30                                             Urinalysis Basic - ( 30 Jun 2025 05:46 )    Color: x / Appearance: x / SG: x / pH: x  Gluc: 185 mg/dL / Ketone: x  / Bili: x / Urobili: x   Blood: x / Protein: x / Nitrite: x   Leuk Esterase: x / RBC: x / WBC x   Sq Epi: x / Non Sq Epi: x / Bacteria: x                                                  LIVER FUNCTIONS - ( 30 Jun 2025 05:46 )  Alb: 1.8 g/dL / Pro: 4.4 g/dL / ALK PHOS: 132 U/L / ALT: 15 U/L / AST: 24 U/L / GGT: x                                                  Culture - Blood (collected 29 Jun 2025 05:49)  Source: Blood None  Gram Stain (30 Jun 2025 12:29):    Growth in aerobic bottle: Gram Positive Cocci in Clusters    Growth in anaerobic bottle: Gram Positive Cocci in Clusters  Preliminary Report (30 Jun 2025 12:29):    Growth in aerobic bottle: Gram Positive Cocci in Clusters    Growth in anaerobic bottle: Gram Positive Cocci in Clusters    Culture - Blood (collected 29 Jun 2025 05:49)  Source: Blood None  Gram Stain (30 Jun 2025 12:29):    Growth in aerobic bottle: Gram Positive Cocci in Clusters    Growth in anaerobic bottle: Gram Positive Cocci in Clusters  Preliminary Report (30 Jun 2025 16:07):    Growth in aerobic bottle: Gram Positive Cocci in Clusters    Growth in anaerobic bottle: Gram Positive Cocci in Clusters                                                                                        MEDICATIONS  (STANDING):  bacitracin   Ointment 1 Application(s) Topical two times a day  chlorhexidine 2% Cloths 1 Application(s) Topical <User Schedule>  dextrose 5%. 1000 milliLiter(s) (50 mL/Hr) IV Continuous <Continuous>  dextrose 5%. 1000 milliLiter(s) (100 mL/Hr) IV Continuous <Continuous>  dextrose 50% Injectable 25 Gram(s) IV Push once  dextrose 50% Injectable 12.5 Gram(s) IV Push once  dextrose 50% Injectable 25 Gram(s) IV Push once  diltiazem Infusion 5 mG/Hr (5 mL/Hr) IV Continuous <Continuous>  glucagon  Injectable 1 milliGRAM(s) IntraMuscular once  insulin lispro (ADMELOG) corrective regimen sliding scale   SubCutaneous three times a day before meals  lactated ringers. 1000 milliLiter(s) (75 mL/Hr) IV Continuous <Continuous>  nafcillin  IVPB 2 Gram(s) IV Intermittent every 4 hours  pancrelipase  (CREON 12,000 Lipase Units) 2 Capsule(s) Oral three times a day with meals  pantoprazole    Tablet 40 milliGRAM(s) Oral before breakfast  sertraline 50 milliGRAM(s) Oral daily  tiZANidine 4 milliGRAM(s) Oral at bedtime  vitamin A & D Ointment 1 Application(s) Topical two times a day    MEDICATIONS  (PRN):  acetaminophen     Tablet .. 650 milliGRAM(s) Oral every 6 hours PRN Temp greater or equal to 38C (100.4F), Mild Pain (1 - 3)  dextrose Oral Gel 15 Gram(s) Oral once PRN Blood Glucose LESS THAN 70 milliGRAM(s)/deciliter  HYDROmorphone  Injectable 0.5 milliGRAM(s) IV Push every 3 hours PRN Severe Pain (7 - 10)  metoprolol tartrate Injectable 5 milliGRAM(s) IV Push every 6 hours PRN heart rate above 110      New X-rays reviewed:                                                                                  ECHO

## 2025-07-01 NOTE — CONSULT NOTE ADULT - CONSULT REQUESTED BY NAME
ICU
Diabetes    Fatty liver disease, nonalcoholic    GERD with esophagitis  Gastritis & Non Bleeding Ulcers  GIB (gastrointestinal bleeding)    Hepatic encephalopathy    Hypercholesteremia    Hypertension    Neuropathy    Obesity    Renal stones  25 years ago
Medicine
Dr. Arce
ICU
Medicine
primary

## 2025-07-01 NOTE — PROGRESS NOTE ADULT - ASSESSMENT
This is a 80-year-old female with past medical history of hypertension hyperlipidemia DVT being treated for pancreatic cancer on chemotherapy with chest port brought to ed for evaluation of the weakness.    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    Covid/Flu/RSV negative   MRSA nares negative     RECOMMENDATIONS  -Repeat blood cultures daily.  -Follow up with urine cultures. Noted E.coli   -TTE noted. Tricuspid valve thickening. Can not rule out vegetation.  -Surgery evaluation-the chest wall port will need to be removed.  -IV Naficillin 2 gram q 4 hours.   -Local wound care for the sacral injury. Avoid constipation.  -Off loading to prevent pressure sores and preventive measures to avoid aspiration. Guarded prognosis. GOC.    If any questions, please send a message or call on [facility] Teams  Please continue to update ID with any pertinent new laboratory or radiographic findings.    Amado Irwin M.D  Infectious Diseases Attending/   [employer] at [facility]     This is a 80-year-old female with past medical history of hypertension hyperlipidemia DVT being treated for pancreatic cancer on chemotherapy with chest port brought to ed for evaluation of the weakness.    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    Covid/Flu/RSV negative   MRSA nares negative     RECOMMENDATIONS  -Repeat blood cultures daily.  -Urine cultures noted for E.coli (Sensitive to Cefazolin)  -TTE noted. Tricuspid valve thickening. Can not rule out vegetation. Will need to consider JOSE when able.  -Surgery evaluation-the chest wall port will need to be removed. IDSA guidelines recommend removal regardless of it being the source of not. If it needs to be confirmed, can do a blood culture from port.   -IV Naficillin 2 gram q 4 hours.   -Local wound care for the sacral injury. Avoid constipation.  -Off loading to prevent pressure sores and preventive measures to avoid aspiration. Very Guarded prognosis. GOC.    If any questions, please send a message or call on [facility]  Please continue to update ID with any pertinent new laboratory or radiographic findings.    Amado Irwin M.D  Infectious Diseases Attending/   [employer] at [facility]

## 2025-07-01 NOTE — SWALLOW BEDSIDE ASSESSMENT ADULT - SWALLOW EVAL: DIAGNOSIS
Pt not appropriate for PO trials. Increased lethargy. Decreased awareness of feeding situation despite max verbal and tactile cues.

## 2025-07-02 LAB
ALBUMIN SERPL ELPH-MCNC: 2.1 G/DL — LOW (ref 3.5–5.2)
ALP SERPL-CCNC: 103 U/L — SIGNIFICANT CHANGE UP (ref 30–115)
ALT FLD-CCNC: <5 U/L — SIGNIFICANT CHANGE UP (ref 0–41)
ANION GAP SERPL CALC-SCNC: 10 MMOL/L — SIGNIFICANT CHANGE UP (ref 7–14)
ANION GAP SERPL CALC-SCNC: 11 MMOL/L — SIGNIFICANT CHANGE UP (ref 7–14)
AST SERPL-CCNC: 13 U/L — SIGNIFICANT CHANGE UP (ref 0–41)
BASOPHILS # BLD AUTO: 0.01 K/UL — SIGNIFICANT CHANGE UP (ref 0–0.2)
BASOPHILS NFR BLD AUTO: 0.1 % — SIGNIFICANT CHANGE UP (ref 0–2)
BILIRUB SERPL-MCNC: 0.7 MG/DL — SIGNIFICANT CHANGE UP (ref 0.2–1.2)
BUN SERPL-MCNC: 33 MG/DL — HIGH (ref 10–20)
BUN SERPL-MCNC: 33 MG/DL — HIGH (ref 10–20)
CALCIUM SERPL-MCNC: 9.1 MG/DL — SIGNIFICANT CHANGE UP (ref 8.4–10.5)
CALCIUM SERPL-MCNC: 9.2 MG/DL — SIGNIFICANT CHANGE UP (ref 8.4–10.5)
CHLORIDE SERPL-SCNC: 106 MMOL/L — SIGNIFICANT CHANGE UP (ref 98–110)
CHLORIDE SERPL-SCNC: 107 MMOL/L — SIGNIFICANT CHANGE UP (ref 98–110)
CO2 SERPL-SCNC: 24 MMOL/L — SIGNIFICANT CHANGE UP (ref 17–32)
CO2 SERPL-SCNC: 24 MMOL/L — SIGNIFICANT CHANGE UP (ref 17–32)
CREAT SERPL-MCNC: 0.7 MG/DL — SIGNIFICANT CHANGE UP (ref 0.7–1.5)
CREAT SERPL-MCNC: 0.7 MG/DL — SIGNIFICANT CHANGE UP (ref 0.7–1.5)
EGFR: 91 ML/MIN/1.73M2 — SIGNIFICANT CHANGE UP
EOSINOPHIL # BLD AUTO: 0 K/UL — SIGNIFICANT CHANGE UP (ref 0–0.5)
EOSINOPHIL NFR BLD AUTO: 0 % — SIGNIFICANT CHANGE UP (ref 0–6)
GLUCOSE SERPL-MCNC: 178 MG/DL — HIGH (ref 70–99)
GLUCOSE SERPL-MCNC: 258 MG/DL — HIGH (ref 70–99)
GRAM STN FLD: ABNORMAL
GRAM STN FLD: ABNORMAL
HCT VFR BLD CALC: 25 % — LOW (ref 34.5–45)
HCT VFR BLD CALC: 28 % — LOW (ref 34.5–45)
HGB BLD-MCNC: 7.8 G/DL — LOW (ref 11.5–15.5)
HGB BLD-MCNC: 8.6 G/DL — LOW (ref 11.5–15.5)
IMM GRANULOCYTES # BLD AUTO: 0.06 K/UL — SIGNIFICANT CHANGE UP (ref 0–0.07)
IMM GRANULOCYTES NFR BLD AUTO: 0.8 % — SIGNIFICANT CHANGE UP (ref 0–0.9)
IMMATURE PLATELET FRACTION #: 4.2 K/UL — LOW (ref 4.7–11.1)
IMMATURE PLATELET FRACTION #: 4.7 K/UL — SIGNIFICANT CHANGE UP (ref 4.7–11.1)
IMMATURE PLATELET FRACTION %: 5.4 % — HIGH (ref 1.6–4.9)
IMMATURE PLATELET FRACTION %: 5.5 % — HIGH (ref 1.6–4.9)
LYMPHOCYTES # BLD AUTO: 1.12 K/UL — SIGNIFICANT CHANGE UP (ref 1–3.3)
LYMPHOCYTES NFR BLD AUTO: 14.8 % — SIGNIFICANT CHANGE UP (ref 13–44)
MAGNESIUM SERPL-MCNC: 2.2 MG/DL — SIGNIFICANT CHANGE UP (ref 1.8–2.4)
MCHC RBC-ENTMCNC: 29.3 PG — SIGNIFICANT CHANGE UP (ref 27–34)
MCHC RBC-ENTMCNC: 29.4 PG — SIGNIFICANT CHANGE UP (ref 27–34)
MCHC RBC-ENTMCNC: 30.7 G/DL — LOW (ref 32–36)
MCHC RBC-ENTMCNC: 31.2 G/DL — LOW (ref 32–36)
MCV RBC AUTO: 94.3 FL — SIGNIFICANT CHANGE UP (ref 80–100)
MCV RBC AUTO: 95.2 FL — SIGNIFICANT CHANGE UP (ref 80–100)
MONOCYTES # BLD AUTO: 0.59 K/UL — SIGNIFICANT CHANGE UP (ref 0–0.9)
MONOCYTES NFR BLD AUTO: 7.8 % — SIGNIFICANT CHANGE UP (ref 2–14)
NEUTROPHILS # BLD AUTO: 5.77 K/UL — SIGNIFICANT CHANGE UP (ref 1.8–7.4)
NEUTROPHILS NFR BLD AUTO: 76.5 % — SIGNIFICANT CHANGE UP (ref 43–77)
NRBC # BLD AUTO: 0 K/UL — SIGNIFICANT CHANGE UP (ref 0–0)
NRBC # BLD AUTO: 0 K/UL — SIGNIFICANT CHANGE UP (ref 0–0)
NRBC # FLD: 0 K/UL — SIGNIFICANT CHANGE UP (ref 0–0)
NRBC # FLD: 0 K/UL — SIGNIFICANT CHANGE UP (ref 0–0)
NRBC BLD AUTO-RTO: 0 /100 WBCS — SIGNIFICANT CHANGE UP (ref 0–0)
NRBC BLD AUTO-RTO: 0 /100 WBCS — SIGNIFICANT CHANGE UP (ref 0–0)
PLATELET # BLD AUTO: 78 K/UL — LOW (ref 150–400)
PLATELET # BLD AUTO: 86 K/UL — LOW (ref 150–400)
PMV BLD: 12.2 FL — SIGNIFICANT CHANGE UP (ref 7–13)
PMV BLD: 12.6 FL — SIGNIFICANT CHANGE UP (ref 7–13)
POTASSIUM SERPL-MCNC: 2.7 MMOL/L — CRITICAL LOW (ref 3.5–5)
POTASSIUM SERPL-MCNC: 3.8 MMOL/L — SIGNIFICANT CHANGE UP (ref 3.5–5)
POTASSIUM SERPL-SCNC: 2.7 MMOL/L — CRITICAL LOW (ref 3.5–5)
POTASSIUM SERPL-SCNC: 3.8 MMOL/L — SIGNIFICANT CHANGE UP (ref 3.5–5)
PROT SERPL-MCNC: 5.2 G/DL — LOW (ref 6–8)
RBC # BLD: 2.65 M/UL — LOW (ref 3.8–5.2)
RBC # BLD: 2.94 M/UL — LOW (ref 3.8–5.2)
RBC # FLD: 15.8 % — HIGH (ref 10.3–14.5)
RBC # FLD: 15.9 % — HIGH (ref 10.3–14.5)
SODIUM SERPL-SCNC: 141 MMOL/L — SIGNIFICANT CHANGE UP (ref 135–146)
SODIUM SERPL-SCNC: 141 MMOL/L — SIGNIFICANT CHANGE UP (ref 135–146)
SPECIMEN SOURCE: SIGNIFICANT CHANGE UP
SPECIMEN SOURCE: SIGNIFICANT CHANGE UP
WBC # BLD: 7.55 K/UL — SIGNIFICANT CHANGE UP (ref 3.8–10.5)
WBC # BLD: 8.44 K/UL — SIGNIFICANT CHANGE UP (ref 3.8–10.5)
WBC # FLD AUTO: 7.55 K/UL — SIGNIFICANT CHANGE UP (ref 3.8–10.5)
WBC # FLD AUTO: 8.44 K/UL — SIGNIFICANT CHANGE UP (ref 3.8–10.5)

## 2025-07-02 PROCEDURE — 99233 SBSQ HOSP IP/OBS HIGH 50: CPT

## 2025-07-02 PROCEDURE — 71045 X-RAY EXAM CHEST 1 VIEW: CPT | Mod: 26

## 2025-07-02 PROCEDURE — 36590 REMOVAL TUNNELED CV CATH: CPT

## 2025-07-02 RX ORDER — LIDOCAINE HCL/EPINEPHRINE/PF 1 %-1:200K
30 AMPUL (ML) INJECTION ONCE
Refills: 0 | Status: COMPLETED | OUTPATIENT
Start: 2025-07-02 | End: 2025-07-02

## 2025-07-02 RX ORDER — LIDOCAINE HCL/PF 10 MG/ML
30 VIAL (ML) INJECTION ONCE
Refills: 0 | Status: DISCONTINUED | OUTPATIENT
Start: 2025-07-02 | End: 2025-07-02

## 2025-07-02 RX ADMIN — INSULIN LISPRO 2: 100 INJECTION, SOLUTION INTRAVENOUS; SUBCUTANEOUS at 11:37

## 2025-07-02 RX ADMIN — Medication 40 MILLIEQUIVALENT(S): at 09:31

## 2025-07-02 RX ADMIN — Medication 50 MILLIEQUIVALENT(S): at 09:31

## 2025-07-02 RX ADMIN — Medication 650 MILLIGRAM(S): at 11:42

## 2025-07-02 RX ADMIN — NAFCILLIN 200 GRAM(S): 2 INJECTION, SOLUTION INTRAVENOUS at 02:00

## 2025-07-02 RX ADMIN — Medication 50 MILLIEQUIVALENT(S): at 13:09

## 2025-07-02 RX ADMIN — MEDPURA VITAMIN A AND D 1 APPLICATION(S): 95 OINTMENT TOPICAL at 17:03

## 2025-07-02 RX ADMIN — SERTRALINE 50 MILLIGRAM(S): 100 TABLET, FILM COATED ORAL at 11:37

## 2025-07-02 RX ADMIN — INSULIN LISPRO 3: 100 INJECTION, SOLUTION INTRAVENOUS; SUBCUTANEOUS at 06:08

## 2025-07-02 RX ADMIN — Medication 650 MILLIGRAM(S): at 12:30

## 2025-07-02 RX ADMIN — NAFCILLIN 200 GRAM(S): 2 INJECTION, SOLUTION INTRAVENOUS at 21:11

## 2025-07-02 RX ADMIN — Medication 30 MILLILITER(S): at 14:59

## 2025-07-02 RX ADMIN — INSULIN LISPRO 2: 100 INJECTION, SOLUTION INTRAVENOUS; SUBCUTANEOUS at 16:50

## 2025-07-02 RX ADMIN — Medication 50 MILLIEQUIVALENT(S): at 11:37

## 2025-07-02 RX ADMIN — NAFCILLIN 200 GRAM(S): 2 INJECTION, SOLUTION INTRAVENOUS at 13:21

## 2025-07-02 RX ADMIN — Medication 1 APPLICATION(S): at 05:52

## 2025-07-02 RX ADMIN — NAFCILLIN 200 GRAM(S): 2 INJECTION, SOLUTION INTRAVENOUS at 17:03

## 2025-07-02 RX ADMIN — TIZANIDINE 4 MILLIGRAM(S): 4 TABLET ORAL at 21:11

## 2025-07-02 RX ADMIN — DILTIAZEM HYDROCHLORIDE 30 MILLIGRAM(S): 240 TABLET, EXTENDED RELEASE ORAL at 13:09

## 2025-07-02 RX ADMIN — DILTIAZEM HYDROCHLORIDE 30 MILLIGRAM(S): 240 TABLET, EXTENDED RELEASE ORAL at 05:52

## 2025-07-02 RX ADMIN — NAFCILLIN 200 GRAM(S): 2 INJECTION, SOLUTION INTRAVENOUS at 05:51

## 2025-07-02 RX ADMIN — MEDPURA VITAMIN A AND D 1 APPLICATION(S): 95 OINTMENT TOPICAL at 05:52

## 2025-07-02 RX ADMIN — NAFCILLIN 200 GRAM(S): 2 INJECTION, SOLUTION INTRAVENOUS at 09:30

## 2025-07-02 RX ADMIN — DILTIAZEM HYDROCHLORIDE 30 MILLIGRAM(S): 240 TABLET, EXTENDED RELEASE ORAL at 21:11

## 2025-07-02 NOTE — PROGRESS NOTE ADULT - ASSESSMENT
IMPRESSION:    MSSA bacteremia- Unclear Source possible port  Multifocal Pneumonia   Thrombocytopenia   Anemia   Metabolic encephalopathy   Fall with Sacral bruising  CKD 3  Transaminitis  Pancreatic CA on chemo via left sided Chest wall port  Pulmonary Nodules  HO HTN  HO DVT       PLAN:      CNS: avoid sedation, CTH and C spine noted     HEENT: Oral care    PULMONARY:  HOB @ 45 degrees. Aspiration precautions, wean oxygen, CXR and Ct chest noted with PNA and possible metastatic spread, Incentive tomás     CARDIOVASCULAR: avoid volume overload, MAP adequate, f/u echo noted, cannot r/o tricuspid vegetation may need JOSE for eval, DC LR fluids, bolus as needed       GI: GI prophylaxis. Feeding after SLP eval, NG tube      RENAL: Follow up lytes. Correct as needed, f/u UO, place [facility] if retaining     INFECTIOUS DISEASE: Follow up cultures, procalcitonin, repeat until clearance, f/u MRSA nares and RVP negative, deescalate to Nafacillin, port removal today     HEMATOLOGICAL: Trend platelets, hold DVT PPX <50, US duplex noted, SCD, s/p platelet transfusion, Hb if <7, DIC panel noted        ENDOCRINE:  Follow up FS. Insulin protocol if needed.    MUSCULOSKELETAL: ambulate as tolerated    SDU, repeat labs at 11, if stable can DG to tele   Pallative eval   Poor prognosis

## 2025-07-02 NOTE — PROGRESS NOTE ADULT - SUBJECTIVE AND OBJECTIVE BOX
24H events:    Patient is a 74y old Female who presents with a chief complaint of weakness (02 Jul 2025 12:15)    Primary diagnosis of Sepsis due to pneumonia    This morning patient was seen and examined at bedside  No acute or major events overnight.    PAST MEDICAL & SURGICAL HISTORY    SOCIAL HISTORY:  Social History:      ALLERGIES:  No Known Allergies    MEDICATIONS:  STANDING MEDICATIONS  bacitracin   Ointment 1 Application(s) Topical two times a day  chlorhexidine 2% Cloths 1 Application(s) Topical <User Schedule>  dextrose 5%. 1000 milliLiter(s) IV Continuous <Continuous>  dextrose 5%. 1000 milliLiter(s) IV Continuous <Continuous>  dextrose 50% Injectable 25 Gram(s) IV Push once  dextrose 50% Injectable 12.5 Gram(s) IV Push once  dextrose 50% Injectable 25 Gram(s) IV Push once  diltiazem    Tablet 30 milliGRAM(s) Oral every 8 hours  glucagon  Injectable 1 milliGRAM(s) IntraMuscular once  insulin lispro (ADMELOG) corrective regimen sliding scale   SubCutaneous three times a day before meals  lidocaine 1%/epinephrine 1:100,000 Inj 30 milliLiter(s) Local Injection once  nafcillin  IVPB 2 Gram(s) IV Intermittent every 4 hours  pantoprazole    Tablet 40 milliGRAM(s) Oral before breakfast  sertraline 50 milliGRAM(s) Oral daily  tiZANidine 4 milliGRAM(s) Oral at bedtime  vitamin A & D Ointment 1 Application(s) Topical two times a day    PRN MEDICATIONS  acetaminophen     Tablet .. 650 milliGRAM(s) Oral every 6 hours PRN  dextrose Oral Gel 15 Gram(s) Oral once PRN  HYDROmorphone  Injectable 0.5 milliGRAM(s) IV Push every 3 hours PRN  metoprolol tartrate Injectable 5 milliGRAM(s) IV Push every 6 hours PRN    VITALS:   T(F): 97.1  HR: 100  BP: 114/80  RR: 18  SpO2: 100%    PHYSICAL EXAM:  GENERAL: NAD, obtunded, ill-appearing  HEAD: NCAT  NECK: No JVD  HEART: RRR, s1, s2  LUNGS: coarse bs  ABDOMEN: Soft, NTTP  EXTREMITIES: no edema  NERVOUS SYSTEM: no focal deficits    LABS:                        8.6    8.44  )-----------( 86       ( 02 Jul 2025 11:40 )             28.0     07-02    141  |  107  |  33[H]  ----------------------------<  258[H]  2.7[LL]   |  24  |  0.7    Ca    9.2      02 Jul 2025 05:42  Mg     2.2     07-02    TPro  5.2[L]  /  Alb  2.1[L]  /  TBili  0.7  /  DBili  x   /  AST  13  /  ALT  <5  /  AlkPhos  103  07-02    PT/INR - ( 01 Jul 2025 11:24 )   PT: 10.90 sec;   INR: 0.93 ratio         PTT - ( 01 Jul 2025 11:24 )  PTT:27.5 sec  Urinalysis Basic - ( 02 Jul 2025 05:42 )    Color: x / Appearance: x / SG: x / pH: x  Gluc: 258 mg/dL / Ketone: x  / Bili: x / Urobili: x   Blood: x / Protein: x / Nitrite: x   Leuk Esterase: x / RBC: x / WBC x   Sq Epi: x / Non Sq Epi: x / Bacteria: x            Culture - Blood (collected 01 Jul 2025 05:48)  Source: Blood None  Gram Stain (02 Jul 2025 14:14):    Growth in aerobic bottle:    Gram Positive Cocci in Clusters  Preliminary Report (02 Jul 2025 14:14):    Growth in aerobic bottle:    Gram Positive Cocci in Clusters    Culture - Blood (collected 30 Jun 2025 05:46)  Source: Blood None  Preliminary Report (01 Jul 2025 17:01):    No growth at 24 hours          RADIOLOGY:    ASSESSMENT AND PLAN  LINDA ONEAL is a [id number] Female with a history significant for  MSSA bacteremia- Unclear Source possible port  Multifocal Pneumonia   Thrombocytopenia   Anemia   Metabolic encephalopathy   Fall with Sacral bruising  CKD 3  Transaminitis  Pancreatic CA on chemo via left sided Chest wall port  Pulmonary Nodules  HO HTN  HO DVT       PLAN:      CNS: avoid sedation, CTH and C spine noted     HEENT: Oral care    PULMONARY:  HOB @ 45 degrees. Aspiration precautions, wean oxygen, CXR and Ct chest noted with PNA and possible metastatic spread, Incentive tomás     CARDIOVASCULAR: avoid volume overload, MAP adequate, f/u echo noted, cannot r/o tricuspid vegetation may need JOSE for eval, DC LR fluids, bolus as needed       GI: GI prophylaxis. Feeding after SLP eval, NG tube      RENAL: Follow up lytes. Correct as needed, f/u UO, place [facility]il[facility] if retaining     INFECTIOUS DISEASE: Follow up cultures, procalcitonin, repeat until clearance, f/u MRSA nares and RVP negative, deescalate to Nafacillin, port removal today     HEMATOLOGICAL: Trend platelets, hold DVT PPX <50, US duplex noted, SCD, s/p platelet transfusion, Hb if <7, DIC panel noted        ENDOCRINE:  Follow up FS. Insulin protocol if needed.    MUSCULOSKELETAL: ambulate as tolerated    SDU, repeat labs at 11, if stable can DG to tele   Pallative eval   Poor prognosis

## 2025-07-02 NOTE — PROGRESS NOTE ADULT - ASSESSMENT
This is a 80-year-old female with past medical history of hypertension, hyperlipidemia, DVT being treated for pancreatic cancer on chemotherapy with chest port brought to ed for evaluation of the weakness ans s/p fall.    MSSA bacteremia- Unclear Source possible chemoport  Multifocal Pneumonia   Thrombocytopenia   Metabolic encephalopathy   Fall with Sacral bruising  CKD 3  Transaminitis  Pancreatic CA on chemo via left sided Chest wall port  Pulmonary Nodules  Afib RVR   HO HTN  HO DVT     -Bcx noted, MSSA, positive  and repeat  still positive,  prelim negative   -repeat cultures in lab, repeat daily until two negatives  -ucx noted e. coli, sens noted   - nafcillin per ID  -TTE noted, cannot r/o vegetation on tricuspid. ID considering JOSE when more stable though will likely not    -surgery to remove chemoport today, given one unit plt  per heme/onc, platelets improved >50k now  -Discussed with ID, bcx is positive for MSSA with a CVC (chemoport), regardless of culture results drawn yesterday  from chemoport, the chemoport still needs to be removed as per IDSA guidelines (I reviewed guidelines with ID)  -heme/onc apprecaited, very poor prognosis   -monitor plt's, suspect multifactorial in setting of active cancer pt on chemo as well as sepsis/bacteremia, continue to trend, given one unit  in aniticpation for chemoport removal   -cardizem switched to po, cardio apprecaited   -GOC noted, palliative appreciated,  already thinking about  arrangements, is considering comfort care though wants to see if improvement with abx and chemoport removal   -dilaudid per palliative   -c/w NGT feeds   -prognosis very poor overall-discussed with  at bedside today who understands though he wants to give her some more time

## 2025-07-02 NOTE — BRIEF OPERATIVE NOTE - OPERATION/FINDINGS
erythema and pus from left chest portocath  injection of 20cc 1% lidocaine   3cm transverse incision over the port, followed by removal of the catheter  Piece of catheter sent for culture, evidence of pus in the cavity  wound thoroughly irrigated with saline  3-0 vicryl used to approximate subQ tissue  Dressed with steristrips, gauze, and tegaderm erythema and pus from left chest portocath  injection of 20cc 1% lidocaine   3cm transverse incision over the port, followed by removal of the catheter  Piece of catheter tubing sent for culture, evidence of pus in the cavity noted today  wound thoroughly irrigated with saline  3-0 vicryl used to approximate subQ tissue  Dressed with steristrips, gauze, and tegaderm

## 2025-07-02 NOTE — PROGRESS NOTE ADULT - SUBJECTIVE AND OBJECTIVE BOX
Subjective/Objective:     HPI-Cardiology/Events/Updates  Pt evaluated at bedside. No overnight events and Pt has no complaints. Radiology tests and hospital records, were reviewed, as well as previous notes on this patient.         MEDICATIONS  (STANDING):  bacitracin   Ointment 1 Application(s) Topical two times a day  chlorhexidine 2% Cloths 1 Application(s) Topical <User Schedule>  dextrose 5%. 1000 milliLiter(s) (50 mL/Hr) IV Continuous <Continuous>  dextrose 5%. 1000 milliLiter(s) (100 mL/Hr) IV Continuous <Continuous>  dextrose 50% Injectable 25 Gram(s) IV Push once  dextrose 50% Injectable 12.5 Gram(s) IV Push once  dextrose 50% Injectable 25 Gram(s) IV Push once  diltiazem    Tablet 30 milliGRAM(s) Oral every 8 hours  glucagon  Injectable 1 milliGRAM(s) IntraMuscular once  insulin lispro (ADMELOG) corrective regimen sliding scale   SubCutaneous three times a day before meals  nafcillin  IVPB 2 Gram(s) IV Intermittent every 4 hours  pantoprazole    Tablet 40 milliGRAM(s) Oral before breakfast  potassium chloride  20 mEq/100 mL IVPB 20 milliEquivalent(s) IV Intermittent every 2 hours  sertraline 50 milliGRAM(s) Oral daily  tiZANidine 4 milliGRAM(s) Oral at bedtime  vitamin A & D Ointment 1 Application(s) Topical two times a day    MEDICATIONS  (PRN):  acetaminophen     Tablet .. 650 milliGRAM(s) Oral every 6 hours PRN Temp greater or equal to 38C (100.4F), Mild Pain (1 - 3)  dextrose Oral Gel 15 Gram(s) Oral once PRN Blood Glucose LESS THAN 70 milliGRAM(s)/deciliter  HYDROmorphone  Injectable 0.5 milliGRAM(s) IV Push every 3 hours PRN Severe Pain (7 - 10)  metoprolol tartrate Injectable 5 milliGRAM(s) IV Push every 6 hours PRN heart rate above 110          Vital Signs Last 24 Hrs  T(C): 36.2 (02 Jul 2025 07:01), Max: 36.3 (01 Jul 2025 15:02)  T(F): 97.1 (02 Jul 2025 07:01), Max: 97.4 (01 Jul 2025 15:02)  HR: 97 (02 Jul 2025 07:09) (80 - 97)  BP: 113/74 (02 Jul 2025 07:09) (108/62 - 140/63)  BP(mean): 89 (02 Jul 2025 07:09) (80 - 93)  RR: 18 (02 Jul 2025 07:09) (14 - 24)  SpO2: 100% (02 Jul 2025 07:09) (97% - 100%)    Parameters below as of 02 Jul 2025 07:09  Patient On (Oxygen Delivery Method): nasal cannula  O2 Flow (L/min): 3    I&O's Detail    01 Jul 2025 07:01  -  02 Jul 2025 07:00  --------------------------------------------------------  IN:    Diltiazem: 110 mL    Enteral Tube Flush: 50 mL    Free Water: 200 mL    Glucerna: 240 mL    IV PiggyBack: 500 mL    Lactated Ringers: 150 mL  Total IN: 1250 mL    OUT:    Indwelling Catheter - Urethral (mL): 1400 mL  Total OUT: 1400 mL    Total NET: -150 mL            PHYSICAL EXAM:  GENERAL:  75y/o Female NAD, resting comfortably.  HEAD:  Atraumatic, Normocephalic  EYES: EOMI, PERRLA, conjunctiva and sclera clear  NECK: Supple, No JVD, no cervical lymphadenopathy, non-tender  CHEST/LUNG: Clear to auscultation bilaterally; No wheeze, rhonchi, or rales  HEART: Regular rate and rhythm; S1&S2  ABDOMEN: Soft, Nontender, Nondistended x 4 quadrants; Bowel sounds present  EXTREMITIES:   Peripheral Pulses Present, No clubbing, no cyanosis, or no edema, no calf tenderness  PSYCH: AAOx3, cooperative, appropriate  NEUROLOGY: WNL  SKIN: WNL        EKG/ TELEM:  < from: 12 Lead ECG (06.28.25 @ 16:41) >  Sinus tachycardia with premature atrial complexes  Undetermined rhythm  Low voltage QRS  Incomplete right bundle branch block  Left anterior fascicular block  Possible Anterolateral infarct , age undetermined  Abnormal ECG    < end of copied text >          LABS:                          7.8    7.55  )-----------( 78       ( 02 Jul 2025 05:42 )             25.0     PT/INR - ( 01 Jul 2025 11:24 )   PT: 10.90 sec;   INR: 0.93 ratio         PTT - ( 01 Jul 2025 11:24 )  PTT:27.5 sec  02 Jul 2025 05:42    141    |  107    |  33[H]  ----------------------------<  258[H]  2.7[LL]   |  24     |  0.7    01 Jul 2025 05:48    139    |  101    |  40[H]  ----------------------------<  234[H]  3.1[L]   |  22     |  0.8      Ca    9.2        02 Jul 2025 05:42  Ca    9.1        01 Jul 2025 05:48  Mg     2.2       02 Jul 2025 05:42  Mg     2.2       01 Jul 2025 05:48    TPro  5.2[L]  /  Alb  2.1[L]  /  TBili  0.7    /  DBili  x      /  AST  13     /  ALT  <5     /  AlkPhos  103    02 Jul 2025 05:42  TPro  4.5[L]  /  Alb  1.8[L]  /  TBili  0.9    /  DBili  x      /  AST  20     /  ALT  6      /  AlkPhos  119[H]  01 Jul 2025 05:48          Diagnostic testing:  < from: Xray Chest 1 View-PORTABLE IMMEDIATE (Xray Chest 1 View-PORTABLE IMMEDIATE .) (07.01.25 @ 11:00) >  IMPRESSION:    Support devices as described.    Essentially stable left basilar opacity/effusion.    --- End of Report ---    < end of copied text >            < from: TTE Echo Complete w/o Contrast w/ Doppler (06.30.25 @ 11:35) >  CONCLUSIONS:     1. Left ventricular systolic function is normal with an ejection   fraction visually estimated at 55 to 60 %.   2. The left ventricular diastolic function is indeterminate.   3. Normal right ventricular cavity size, with normal wall thickness, and   normal right ventricular systolic function. Tricuspid annular tissue   Doppler S' is 25.3 cm/s (normal >10 cm/s).   4. Normal left and right atrial size.   5. Aortic valve was not well visualized.   6. There is mildcalcification of the mitral valve annulus.   7. Trace mitral regurgitation at a blood pressure of 118/57 mmHg.   8. Possible thickening of tricuspid valve. Cannot rule out vegetation.   9. Aortic root at the sinuses of Valsalva is dilated, measuring 3.80 cm   (indexed 2.25 cm/m²).  10. Estimated pulmonary artery systolic pressure is 29 mmHg, consistent   with normal pulmonary artery pressure.  11. Small pericardial effusion noted adjacent to the anterior right   ventricle with no echocardiographic evidence of tamponade physiology.    < end of copied text >        Assessment and Recommendations:  80-year-old female with past medical history of hypertension hyperlipidemia DVT on Eliquis recently being treated for pancreatic cancer on chemotherapy with chest port brought to ed for evaluation of the weakness.    OUTPATIENT CARDIOLOGIST:  Beatrice johnson Patient lethargic, in NAD    IMPRESSION:  #MSSA bacteremia- Unclear Source possible chemoport  #Multifocal Pneumonia   #Thrombocytopenia s/p platelets  #Pancreatic CA on chemo via left sided Chest wall port/ possible surgical removal today  #AFib c RVR?  Telemetry appears to be sinus w/ PACs  EKG: Sinus tachycardia with premature atrial complexes  TTE 6/30/2025: Normal LVSF, EF 55-60%, Small pericardial effusion noted adjacent to the anterior right ventricle with no echocardiographic evidence of tamponade physiology. Possible thickening of tricuspid valve. Cannot rule out vegetation.  Will consider JOSE when medically optimized  Cont with medical management per primary team

## 2025-07-02 NOTE — PROGRESS NOTE ADULT - SUBJECTIVE AND OBJECTIVE BOX
LINDA ONEAL 74y Female  MRN#: [id number]     Hospital Day: 5d      SUBJECTIVE  No acute events overnight, pt seen and examined this morning.                                          ----------------------------------------------------------  OBJECTIVE  PAST MEDICAL & SURGICAL HISTORY                                            -----------------------------------------------------------  ALLERGIES:  No Known Allergies                                            ------------------------------------------------------------    HOME MEDICATIONS  Home Medications:  Claritin 10 mg oral tablet: 1 tab(s) orally once a day (27 Jun 2025 16:31)  Creon 24,000 units oral delayed release capsule: 1 cap(s) orally 3 times a day (27 Jun 2025 16:31)  docusate: 3 tab(s) orally once a day (27 Jun 2025 16:31)  Eliquis 5 mg oral tablet: 1 tab(s) orally 2 times a day (27 Jun 2025 16:31)  glimepiride 2 mg oral tablet: 1 tab(s) orally once a day (27 Jun 2025 16:31)  HYDROmorphone 4 mg oral tablet: 1 tab(s) orally every 6 hours as needed for  severe pain (27 Jun 2025 16:31)  lansoprazole 30 mg oral delayed release capsule: 1 cap(s) orally once a day (27 Jun 2025 16:31)  morphine 30 mg oral tablet: 1 tab(s) orally 3 times a day as needed for  moderate pain (27 Jun 2025 16:31)  sertraline 50 mg oral tablet: 1 tab(s) orally once a day (27 Jun 2025 16:31)  tiZANidine 4 mg oral capsule: 2 cap(s) orally once a day (at bedtime) (27 Jun 2025 16:31)                           MEDICATIONS:  STANDING MEDICATIONS  bacitracin   Ointment 1 Application(s) Topical two times a day  chlorhexidine 2% Cloths 1 Application(s) Topical <User Schedule>  dextrose 5%. 1000 milliLiter(s) IV Continuous <Continuous>  dextrose 5%. 1000 milliLiter(s) IV Continuous <Continuous>  dextrose 50% Injectable 25 Gram(s) IV Push once  dextrose 50% Injectable 12.5 Gram(s) IV Push once  dextrose 50% Injectable 25 Gram(s) IV Push once  diltiazem    Tablet 30 milliGRAM(s) Oral every 8 hours  glucagon  Injectable 1 milliGRAM(s) IntraMuscular once  insulin lispro (ADMELOG) corrective regimen sliding scale   SubCutaneous three times a day before meals  nafcillin  IVPB 2 Gram(s) IV Intermittent every 4 hours  pantoprazole    Tablet 40 milliGRAM(s) Oral before breakfast  potassium chloride  20 mEq/100 mL IVPB 20 milliEquivalent(s) IV Intermittent every 2 hours  sertraline 50 milliGRAM(s) Oral daily  tiZANidine 4 milliGRAM(s) Oral at bedtime  vitamin A & D Ointment 1 Application(s) Topical two times a day    PRN MEDICATIONS  acetaminophen     Tablet .. 650 milliGRAM(s) Oral every 6 hours PRN  dextrose Oral Gel 15 Gram(s) Oral once PRN  HYDROmorphone  Injectable 0.5 milliGRAM(s) IV Push every 3 hours PRN  metoprolol tartrate Injectable 5 milliGRAM(s) IV Push every 6 hours PRN                                            ------------------------------------------------------------  VITAL SIGNS: Last 24 Hours  T(C): 36.2 (02 Jul 2025 07:01), Max: 36.3 (01 Jul 2025 15:02)  T(F): 97.1 (02 Jul 2025 07:01), Max: 97.4 (01 Jul 2025 15:02)  HR: 97 (02 Jul 2025 07:09) (80 - 97)  BP: 113/74 (02 Jul 2025 07:09) (108/62 - 140/63)  BP(mean): 89 (02 Jul 2025 07:09) (80 - 93)  RR: 18 (02 Jul 2025 07:09) (14 - 24)  SpO2: 100% (02 Jul 2025 07:09) (97% - 100%)      07-01-25 @ 07:01  -  07-02-25 @ 07:00  --------------------------------------------------------  IN: 1250 mL / OUT: 1400 mL / NET: -150 mL                                             --------------------------------------------------------------  LABS:                        7.8    7.55  )-----------( 78       ( 02 Jul 2025 05:42 )             25.0     07-02    141  |  107  |  33[H]  ----------------------------<  258[H]  2.7[LL]   |  24  |  0.7    Ca    9.2      02 Jul 2025 05:42  Mg     2.2     07-02    TPro  5.2[L]  /  Alb  2.1[L]  /  TBili  0.7  /  DBili  x   /  AST  13  /  ALT  <5  /  AlkPhos  103  07-02    PT/INR - ( 01 Jul 2025 11:24 )   PT: 10.90 sec;   INR: 0.93 ratio         PTT - ( 01 Jul 2025 11:24 )  PTT:27.5 sec  Urinalysis Basic - ( 02 Jul 2025 05:42 )    Color: x / Appearance: x / SG: x / pH: x  Gluc: 258 mg/dL / Ketone: x  / Bili: x / Urobili: x   Blood: x / Protein: x / Nitrite: x   Leuk Esterase: x / RBC: x / WBC x   Sq Epi: x / Non Sq Epi: x / Bacteria: x              Culture - Blood (collected 30 Jun 2025 05:46)  Source: Blood None  Preliminary Report (01 Jul 2025 17:01):    No growth at 24 hours                                                    -------------------------------------------------------------  RADIOLOGY:  CXR      CT                                            --------------------------------------------------------------    PHYSICAL EXAM:  GENERAL:  lying in bed, ill-appearing   CHEST/LUNG: decreased breath sounds  HEART: Regular rate and rhythm; No murmurs, rubs, or gallops  ABDOMEN: Soft, nontender, nondistended  EXTREMITIES: mild le edema

## 2025-07-02 NOTE — PROGRESS NOTE ADULT - SUBJECTIVE AND OBJECTIVE BOX
Patient is a 74y old  Female who presents with a chief complaint of weakness (01 Jul 2025 21:34)        Over Night Events: No acute events noted, on NC, no pressors         ROS:     All ROS are negative except HPI         PHYSICAL EXAM    ICU Vital Signs Last 24 Hrs  T(C): 36.2 (02 Jul 2025 07:01), Max: 36.3 (01 Jul 2025 15:02)  T(F): 97.1 (02 Jul 2025 07:01), Max: 97.4 (01 Jul 2025 15:02)  HR: 87 (02 Jul 2025 04:00) (80 - 93)  BP: 108/62 (02 Jul 2025 04:00) (108/62 - 140/63)  BP(mean): 80 (02 Jul 2025 04:00) (80 - 93)  ABP: --  ABP(mean): --  RR: 18 (02 Jul 2025 07:01) (14 - 24)  SpO2: 100% (02 Jul 2025 04:00) (97% - 100%)    O2 Parameters below as of 02 Jul 2025 04:00  Patient On (Oxygen Delivery Method): nasal cannula  O2 Flow (L/min): 3        CONSTITUTIONAL:  NAD  Ill appearing     ENT:   Airway patent,   Mouth with normal mucosa.     EYES:   Pupils equal,   Round and reactive to light.    CARDIAC:   Normal rate,   Regular rhythm.      Vascular:  Normal systolic impulse  No Carotid bruits    RESPIRATORY:   No wheezing  Bilateral BS    GASTROINTESTINAL:  Abdomen soft,   Non-tender,     MUSCULOSKELETAL:   No clubbing, cyanosis    NEUROLOGICAL:   Alert and oriented x0     SKIN:   Skin normal color for race,   Warm and dry and intact.     07-01-25 @ 07:01  -  07-02-25 @ 07:00  --------------------------------------------------------  IN:    Diltiazem: 110 mL    Enteral Tube Flush: 50 mL    Free Water: 200 mL    Glucerna: 240 mL    IV PiggyBack: 500 mL    Lactated Ringers: 150 mL  Total IN: 1250 mL    OUT:    Indwelling Catheter - Urethral (mL): 1400 mL  Total OUT: 1400 mL    Total NET: -150 mL          LABS:                            7.8    7.55  )-----------( 78       ( 02 Jul 2025 05:42 )             25.0                                               07-02    141  |  107  |  33[H]  ----------------------------<  258[H]  2.7[LL]   |  24  |  0.7    Ca    9.2      02 Jul 2025 05:42  Mg     2.2     07-02    TPro  5.2[L]  /  Alb  2.1[L]  /  TBili  0.7  /  DBili  x   /  AST  13  /  ALT  <5  /  AlkPhos  103  07-02      PT/INR - ( 01 Jul 2025 11:24 )   PT: 10.90 sec;   INR: 0.93 ratio         PTT - ( 01 Jul 2025 11:24 )  PTT:27.5 sec                                       Urinalysis Basic - ( 02 Jul 2025 05:42 )    Color: x / Appearance: x / SG: x / pH: x  Gluc: 258 mg/dL / Ketone: x  / Bili: x / Urobili: x   Blood: x / Protein: x / Nitrite: x   Leuk Esterase: x / RBC: x / WBC x   Sq Epi: x / Non Sq Epi: x / Bacteria: x                                                  LIVER FUNCTIONS - ( 02 Jul 2025 05:42 )  Alb: 2.1 g/dL / Pro: 5.2 g/dL / ALK PHOS: 103 U/L / ALT: <5 U/L / AST: 13 U/L / GGT: x                                                  Culture - Blood (collected 30 Jun 2025 05:46)  Source: Blood None  Preliminary Report (01 Jul 2025 17:01):    No growth at 24 hours                                                                                           MEDICATIONS  (STANDING):  bacitracin   Ointment 1 Application(s) Topical two times a day  chlorhexidine 2% Cloths 1 Application(s) Topical <User Schedule>  dextrose 5%. 1000 milliLiter(s) (50 mL/Hr) IV Continuous <Continuous>  dextrose 5%. 1000 milliLiter(s) (100 mL/Hr) IV Continuous <Continuous>  dextrose 50% Injectable 25 Gram(s) IV Push once  dextrose 50% Injectable 12.5 Gram(s) IV Push once  dextrose 50% Injectable 25 Gram(s) IV Push once  diltiazem    Tablet 30 milliGRAM(s) Oral every 8 hours  glucagon  Injectable 1 milliGRAM(s) IntraMuscular once  insulin lispro (ADMELOG) corrective regimen sliding scale   SubCutaneous three times a day before meals  nafcillin  IVPB 2 Gram(s) IV Intermittent every 4 hours  pantoprazole    Tablet 40 milliGRAM(s) Oral before breakfast  sertraline 50 milliGRAM(s) Oral daily  tiZANidine 4 milliGRAM(s) Oral at bedtime  vitamin A & D Ointment 1 Application(s) Topical two times a day    MEDICATIONS  (PRN):  acetaminophen     Tablet .. 650 milliGRAM(s) Oral every 6 hours PRN Temp greater or equal to 38C (100.4F), Mild Pain (1 - 3)  dextrose Oral Gel 15 Gram(s) Oral once PRN Blood Glucose LESS THAN 70 milliGRAM(s)/deciliter  HYDROmorphone  Injectable 0.5 milliGRAM(s) IV Push every 3 hours PRN Severe Pain (7 - 10)  metoprolol tartrate Injectable 5 milliGRAM(s) IV Push every 6 hours PRN heart rate above 110      New X-rays reviewed:                                                                                  ECHO

## 2025-07-02 NOTE — BRIEF OPERATIVE NOTE - COMMENTS
bedside procedure bedside procedure.  I, Dr. Montalvo was present for the duration of the procedure.

## 2025-07-03 LAB
ALBUMIN SERPL ELPH-MCNC: 2.3 G/DL — LOW (ref 3.5–5.2)
ALP SERPL-CCNC: 104 U/L — SIGNIFICANT CHANGE UP (ref 30–115)
ALT FLD-CCNC: <5 U/L — SIGNIFICANT CHANGE UP (ref 0–41)
ANION GAP SERPL CALC-SCNC: 13 MMOL/L — SIGNIFICANT CHANGE UP (ref 7–14)
AST SERPL-CCNC: 13 U/L — SIGNIFICANT CHANGE UP (ref 0–41)
BASOPHILS # BLD AUTO: 0.02 K/UL — SIGNIFICANT CHANGE UP (ref 0–0.2)
BASOPHILS NFR BLD AUTO: 0.2 % — SIGNIFICANT CHANGE UP (ref 0–2)
BILIRUB SERPL-MCNC: 0.6 MG/DL — SIGNIFICANT CHANGE UP (ref 0.2–1.2)
BUN SERPL-MCNC: 29 MG/DL — HIGH (ref 10–20)
CALCIUM SERPL-MCNC: 9 MG/DL — SIGNIFICANT CHANGE UP (ref 8.4–10.5)
CHLORIDE SERPL-SCNC: 106 MMOL/L — SIGNIFICANT CHANGE UP (ref 98–110)
CO2 SERPL-SCNC: 24 MMOL/L — SIGNIFICANT CHANGE UP (ref 17–32)
CREAT SERPL-MCNC: 0.7 MG/DL — SIGNIFICANT CHANGE UP (ref 0.7–1.5)
EGFR: 91 ML/MIN/1.73M2 — SIGNIFICANT CHANGE UP
EGFR: 91 ML/MIN/1.73M2 — SIGNIFICANT CHANGE UP
EOSINOPHIL # BLD AUTO: 0.01 K/UL — SIGNIFICANT CHANGE UP (ref 0–0.5)
EOSINOPHIL NFR BLD AUTO: 0.1 % — SIGNIFICANT CHANGE UP (ref 0–6)
GLUCOSE SERPL-MCNC: 154 MG/DL — HIGH (ref 70–99)
GRAM STN FLD: ABNORMAL
HCT VFR BLD CALC: 26 % — LOW (ref 34.5–45)
HGB BLD-MCNC: 8 G/DL — LOW (ref 11.5–15.5)
IMM GRANULOCYTES # BLD AUTO: 0.08 K/UL — HIGH (ref 0–0.07)
IMM GRANULOCYTES NFR BLD AUTO: 0.9 % — SIGNIFICANT CHANGE UP (ref 0–0.9)
IMMATURE PLATELET FRACTION #: 4.6 K/UL — LOW (ref 4.7–11.1)
IMMATURE PLATELET FRACTION %: 5.1 % — HIGH (ref 1.6–4.9)
LYMPHOCYTES # BLD AUTO: 1.14 K/UL — SIGNIFICANT CHANGE UP (ref 1–3.3)
LYMPHOCYTES NFR BLD AUTO: 12.4 % — LOW (ref 13–44)
MAGNESIUM SERPL-MCNC: 2.1 MG/DL — SIGNIFICANT CHANGE UP (ref 1.8–2.4)
MCHC RBC-ENTMCNC: 29.5 PG — SIGNIFICANT CHANGE UP (ref 27–34)
MCHC RBC-ENTMCNC: 30.8 G/DL — LOW (ref 32–36)
MCV RBC AUTO: 95.9 FL — SIGNIFICANT CHANGE UP (ref 80–100)
MONOCYTES # BLD AUTO: 0.52 K/UL — SIGNIFICANT CHANGE UP (ref 0–0.9)
MONOCYTES NFR BLD AUTO: 5.6 % — SIGNIFICANT CHANGE UP (ref 2–14)
NEUTROPHILS # BLD AUTO: 7.45 K/UL — HIGH (ref 1.8–7.4)
NEUTROPHILS NFR BLD AUTO: 80.8 % — HIGH (ref 43–77)
NRBC # BLD AUTO: 0 K/UL — SIGNIFICANT CHANGE UP (ref 0–0)
NRBC # FLD: 0 K/UL — SIGNIFICANT CHANGE UP (ref 0–0)
NRBC BLD AUTO-RTO: 0 /100 WBCS — SIGNIFICANT CHANGE UP (ref 0–0)
PLATELET # BLD AUTO: 90 K/UL — LOW (ref 150–400)
PMV BLD: 11.9 FL — SIGNIFICANT CHANGE UP (ref 7–13)
POTASSIUM SERPL-MCNC: 3.2 MMOL/L — LOW (ref 3.5–5)
POTASSIUM SERPL-SCNC: 3.2 MMOL/L — LOW (ref 3.5–5)
PROT SERPL-MCNC: 5.6 G/DL — LOW (ref 6–8)
RBC # BLD: 2.71 M/UL — LOW (ref 3.8–5.2)
RBC # FLD: 15.9 % — HIGH (ref 10.3–14.5)
SODIUM SERPL-SCNC: 143 MMOL/L — SIGNIFICANT CHANGE UP (ref 135–146)
SPECIMEN SOURCE: SIGNIFICANT CHANGE UP
WBC # BLD: 9.22 K/UL — SIGNIFICANT CHANGE UP (ref 3.8–10.5)
WBC # FLD AUTO: 9.22 K/UL — SIGNIFICANT CHANGE UP (ref 3.8–10.5)

## 2025-07-03 PROCEDURE — 71045 X-RAY EXAM CHEST 1 VIEW: CPT | Mod: 26

## 2025-07-03 PROCEDURE — 99233 SBSQ HOSP IP/OBS HIGH 50: CPT

## 2025-07-03 RX ORDER — ENOXAPARIN SODIUM 100 MG/ML
40 INJECTION SUBCUTANEOUS EVERY 24 HOURS
Refills: 0 | Status: DISCONTINUED | OUTPATIENT
Start: 2025-07-03 | End: 2025-07-05

## 2025-07-03 RX ADMIN — NAFCILLIN 200 GRAM(S): 2 INJECTION, SOLUTION INTRAVENOUS at 21:51

## 2025-07-03 RX ADMIN — Medication 40 MILLIEQUIVALENT(S): at 11:39

## 2025-07-03 RX ADMIN — INSULIN LISPRO 2: 100 INJECTION, SOLUTION INTRAVENOUS; SUBCUTANEOUS at 11:51

## 2025-07-03 RX ADMIN — TIZANIDINE 4 MILLIGRAM(S): 4 TABLET ORAL at 21:52

## 2025-07-03 RX ADMIN — DILTIAZEM HYDROCHLORIDE 30 MILLIGRAM(S): 240 TABLET, EXTENDED RELEASE ORAL at 05:57

## 2025-07-03 RX ADMIN — INSULIN LISPRO 2: 100 INJECTION, SOLUTION INTRAVENOUS; SUBCUTANEOUS at 18:26

## 2025-07-03 RX ADMIN — NAFCILLIN 200 GRAM(S): 2 INJECTION, SOLUTION INTRAVENOUS at 02:01

## 2025-07-03 RX ADMIN — Medication 1 APPLICATION(S): at 05:54

## 2025-07-03 RX ADMIN — ENOXAPARIN SODIUM 40 MILLIGRAM(S): 100 INJECTION SUBCUTANEOUS at 11:44

## 2025-07-03 RX ADMIN — NAFCILLIN 200 GRAM(S): 2 INJECTION, SOLUTION INTRAVENOUS at 05:54

## 2025-07-03 RX ADMIN — Medication 1 APPLICATION(S): at 18:22

## 2025-07-03 RX ADMIN — NAFCILLIN 200 GRAM(S): 2 INJECTION, SOLUTION INTRAVENOUS at 15:21

## 2025-07-03 RX ADMIN — NAFCILLIN 200 GRAM(S): 2 INJECTION, SOLUTION INTRAVENOUS at 11:39

## 2025-07-03 RX ADMIN — SERTRALINE 50 MILLIGRAM(S): 100 TABLET, FILM COATED ORAL at 11:51

## 2025-07-03 RX ADMIN — INSULIN LISPRO 1: 100 INJECTION, SOLUTION INTRAVENOUS; SUBCUTANEOUS at 06:56

## 2025-07-03 RX ADMIN — MEDPURA VITAMIN A AND D 1 APPLICATION(S): 95 OINTMENT TOPICAL at 18:23

## 2025-07-03 RX ADMIN — DILTIAZEM HYDROCHLORIDE 30 MILLIGRAM(S): 240 TABLET, EXTENDED RELEASE ORAL at 15:21

## 2025-07-03 RX ADMIN — NAFCILLIN 200 GRAM(S): 2 INJECTION, SOLUTION INTRAVENOUS at 18:24

## 2025-07-03 RX ADMIN — MEDPURA VITAMIN A AND D 1 APPLICATION(S): 95 OINTMENT TOPICAL at 05:54

## 2025-07-03 RX ADMIN — Medication 1 APPLICATION(S): at 05:53

## 2025-07-03 RX ADMIN — DILTIAZEM HYDROCHLORIDE 30 MILLIGRAM(S): 240 TABLET, EXTENDED RELEASE ORAL at 21:51

## 2025-07-03 NOTE — PROGRESS NOTE ADULT - SUBJECTIVE AND OBJECTIVE BOX
LINDA ONEAL 74y Female  MRN#: [id number]     Hospital Day: 6d      SUBJECTIVE  No acute events overnight, pt seen and examined this morning, somewhat more awake today,  at bedside, chemopotrt taken out yewsterday                                           ----------------------------------------------------------  OBJECTIVE  PAST MEDICAL & SURGICAL HISTORY                                            -----------------------------------------------------------  ALLERGIES:  No Known Allergies                                            ------------------------------------------------------------    HOME MEDICATIONS  Home Medications:  Claritin 10 mg oral tablet: 1 tab(s) orally once a day (27 Jun 2025 16:31)  Creon 24,000 units oral delayed release capsule: 1 cap(s) orally 3 times a day (27 Jun 2025 16:31)  docusate: 3 tab(s) orally once a day (27 Jun 2025 16:31)  Eliquis 5 mg oral tablet: 1 tab(s) orally 2 times a day (27 Jun 2025 16:31)  glimepiride 2 mg oral tablet: 1 tab(s) orally once a day (27 Jun 2025 16:31)  HYDROmorphone 4 mg oral tablet: 1 tab(s) orally every 6 hours as needed for  severe pain (27 Jun 2025 16:31)  lansoprazole 30 mg oral delayed release capsule: 1 cap(s) orally once a day (27 Jun 2025 16:31)  morphine 30 mg oral tablet: 1 tab(s) orally 3 times a day as needed for  moderate pain (27 Jun 2025 16:31)  sertraline 50 mg oral tablet: 1 tab(s) orally once a day (27 Jun 2025 16:31)  tiZANidine 4 mg oral capsule: 2 cap(s) orally once a day (at bedtime) (27 Jun 2025 16:31)                           MEDICATIONS:  STANDING MEDICATIONS  bacitracin   Ointment 1 Application(s) Topical two times a day  chlorhexidine 2% Cloths 1 Application(s) Topical <User Schedule>  dextrose 5%. 1000 milliLiter(s) IV Continuous <Continuous>  dextrose 5%. 1000 milliLiter(s) IV Continuous <Continuous>  dextrose 50% Injectable 25 Gram(s) IV Push once  dextrose 50% Injectable 12.5 Gram(s) IV Push once  dextrose 50% Injectable 25 Gram(s) IV Push once  diltiazem    Tablet 30 milliGRAM(s) Oral every 8 hours  enoxaparin Injectable 40 milliGRAM(s) SubCutaneous every 24 hours  glucagon  Injectable 1 milliGRAM(s) IntraMuscular once  insulin lispro (ADMELOG) corrective regimen sliding scale   SubCutaneous three times a day before meals  nafcillin  IVPB 2 Gram(s) IV Intermittent every 4 hours  pantoprazole    Tablet 40 milliGRAM(s) Oral before breakfast  potassium chloride   Powder 40 milliEquivalent(s) Oral once  sertraline 50 milliGRAM(s) Oral daily  tiZANidine 4 milliGRAM(s) Oral at bedtime  vitamin A & D Ointment 1 Application(s) Topical two times a day    PRN MEDICATIONS  acetaminophen     Tablet .. 650 milliGRAM(s) Oral every 6 hours PRN  dextrose Oral Gel 15 Gram(s) Oral once PRN  HYDROmorphone  Injectable 0.5 milliGRAM(s) IV Push every 3 hours PRN  metoprolol tartrate Injectable 5 milliGRAM(s) IV Push every 6 hours PRN                                            ------------------------------------------------------------  VITAL SIGNS: Last 24 Hours  T(C): 36.1 (03 Jul 2025 07:01), Max: 36.2 (02 Jul 2025 23:46)  T(F): 96.9 (03 Jul 2025 07:01), Max: 97.1 (02 Jul 2025 23:46)  HR: 115 (03 Jul 2025 07:07) (95 - 118)  BP: 123/78 (03 Jul 2025 07:07) (111/93 - 133/74)  BP(mean): 95 (03 Jul 2025 07:07) (80 - 106)  RR: 29 (03 Jul 2025 07:07) (18 - 32)  SpO2: 98% (03 Jul 2025 07:07) (97% - 100%)      07-02-25 @ 07:01  -  07-03-25 @ 07:00  --------------------------------------------------------  IN: 2720 mL / OUT: 1170 mL / NET: 1550 mL                                             --------------------------------------------------------------  LABS:                        8.0    9.22  )-----------( 90       ( 03 Jul 2025 05:48 )             26.0     07-03    143  |  106  |  29[H]  ----------------------------<  154[H]  3.2[L]   |  24  |  0.7    Ca    9.0      03 Jul 2025 05:48  Mg     2.1     07-03    TPro  5.6[L]  /  Alb  2.3[L]  /  TBili  0.6  /  DBili  x   /  AST  13  /  ALT  <5  /  AlkPhos  104  07-03    PT/INR - ( 01 Jul 2025 11:24 )   PT: 10.90 sec;   INR: 0.93 ratio         PTT - ( 01 Jul 2025 11:24 )  PTT:27.5 sec  Urinalysis Basic - ( 03 Jul 2025 05:48 )    Color: x / Appearance: x / SG: x / pH: x  Gluc: 154 mg/dL / Ketone: x  / Bili: x / Urobili: x   Blood: x / Protein: x / Nitrite: x   Leuk Esterase: x / RBC: x / WBC x   Sq Epi: x / Non Sq Epi: x / Bacteria: x              Culture - Blood (collected 02 Jul 2025 05:42)  Source: Blood None  Gram Stain (03 Jul 2025 08:14):    Growth in aerobic bottle: Gram Positive Cocci in Clusters  Preliminary Report (03 Jul 2025 08:15):    Growth in aerobic bottle: Gram Positive Cocci in Clusters    Culture - Blood (collected 01 Jul 2025 16:20)  Source: Blood Blood-Catheter  Gram Stain (03 Jul 2025 02:17):    Growth in aerobic bottle: Gram Positive Cocci in Clusters    Growth in anaerobic bottle: Gram Positive Cocci in Clusters  Preliminary Report (03 Jul 2025 02:17):    Growth in aerobic bottle: Gram Positive Cocci in Clusters    Growth in anaerobic bottle: Gram Positive Cocci in Clusters    Culture - Blood (collected 01 Jul 2025 05:48)  Source: Blood None  Gram Stain (03 Jul 2025 06:02):    Growth in aerobic bottle:    Gram Positive Cocci in Clusters    Growth in anaerobic bottle: Gram Positive Cocci in Clusters  Preliminary Report (03 Jul 2025 06:02):    Growth in aerobic bottle:    Gram Positive Cocci in Clusters    Growth in anaerobic bottle: Gram Positive Cocci in Clusters                                                    -------------------------------------------------------------  RADIOLOGY:  CXR  Xray Chest 1 View AP/PA:   ACC: [id number] EXAM:  XR CHEST 1 VIEW   ORDERED BY: POONAM GERBER     PROCEDURE DATE:  07/03/2025          INTERPRETATION:  CLINICAL HISTORY: Follow-up.    COMPARISON: July 2, 2025.    TECHNIQUE: Portable frontal chest radiograph. Low lung volume.    FINDINGS:    Support devices: NGT with its tip below the diaphragm.    Cardiac/mediastinum/hilum: Stable.    Lung parenchyma/Pleura: Left basilar opacity, unchanged and new right   basilar opacity. No pneumothorax seen.    Skeleton/soft tissues: Stable.      IMPRESSION: Low lung volume.    Left basilar opacity, unchanged and new right basilar opacity.    NGT.    --- End of Report ---            ALYSSA MOSELEY MD; Attending Radiologist  This document has been electronically signed. Jul  3 2025  7:54AM (07-03-25 @ 07:52)  Xray Chest 1 View AP/PA:   ACC: [id number] EXAM:  XR CHEST 1 VIEW   ORDERED BY: POONAM GERBER     PROCEDURE DATE:  07/02/2025          INTERPRETATION:  Clinical History: sob.    Comparison : Chest radiograph 7/1/2025.    Technique/Positioning: Frontal chest radiograph.    Findings:    Study limited by patient rotation    Support devices: Feeding tube coursing below field of view. Left chest   port redemonstrated.    Cardiac/mediastinum/hilum: Unchanged.    Lung parenchyma/Pleura: Increasing left-sided opacities/effusion.    Skeleton/soft tissues: Unchanged.    Impression:    Increasing left-sided opacities/effusion.    --- End of Report ---            ADEEL LUTHER MD; Attending Radiologist  This document has been electronically signed. Jul 2 2025 12:25PM (07-02-25 @ 10:35)      CT                                            --------------------------------------------------------------    PHYSICAL EXAM:  GENERAL: NAD, lying in bed, ill-appearing   CHEST/LUNG: decreased beath sounds   HEART: Regular rate and rhythm; No murmurs, rubs, or gallops  ABDOMEN: Soft, nontender, nondistended  EXTREMITIES:  mild le edema

## 2025-07-03 NOTE — PROGRESS NOTE ADULT - SUBJECTIVE AND OBJECTIVE BOX
Patient is a 74y old  Female who presents with a chief complaint of weakness (02 Jul 2025 14:42)        Over Night Events: s/p removal of chest port, on 3LNC         ROS:     All ROS are negative except HPI         PHYSICAL EXAM    ICU Vital Signs Last 24 Hrs  T(C): 36.1 (03 Jul 2025 07:01), Max: 36.2 (02 Jul 2025 23:46)  T(F): 96.9 (03 Jul 2025 07:01), Max: 97.1 (02 Jul 2025 23:46)  HR: 115 (03 Jul 2025 07:07) (95 - 118)  BP: 123/78 (03 Jul 2025 07:07) (111/93 - 133/74)  BP(mean): 95 (03 Jul 2025 07:07) (80 - 106)  ABP: --  ABP(mean): --  RR: 29 (03 Jul 2025 07:07) (18 - 32)  SpO2: 98% (03 Jul 2025 07:07) (97% - 100%)    O2 Parameters below as of 03 Jul 2025 07:07  Patient On (Oxygen Delivery Method): nasal cannula  O2 Flow (L/min): 3      CONSTITUTIONAL:  NAD  Ill appearing     ENT:   Airway patent,   Mouth with normal mucosa.     EYES:   Pupils equal,   Round and reactive to light.    CARDIAC:   Normal rate,   Regular rhythm.      Vascular:  Normal systolic impulse  No Carotid bruits    RESPIRATORY:   No wheezing  Bilateral BS    GASTROINTESTINAL:  Abdomen soft,   Non-tender,     MUSCULOSKELETAL:   No clubbing, cyanosis    NEUROLOGICAL:   Alert and oriented x0     SKIN:   Skin normal color for race,   Warm and dry and intact.         07-02-25 @ 07:01  -  07-03-25 @ 07:00  --------------------------------------------------------  IN:    Enteral Tube Flush: 300 mL    Free Water: 800 mL    Glucerna: 720 mL    IV PiggyBack: 900 mL  Total IN: 2720 mL    OUT:    Indwelling Catheter - Urethral (mL): 770 mL    Intermittent Catheterization - Urethral (mL): 400 mL    Voided (mL): 0 mL  Total OUT: 1170 mL    Total NET: 1550 mL          LABS:                            8.0    9.22  )-----------( 90       ( 03 Jul 2025 05:48 )             26.0                                               07-03    143  |  106  |  29[H]  ----------------------------<  154[H]  3.2[L]   |  24  |  0.7    Ca    9.0      03 Jul 2025 05:48  Mg     2.1     07-03    TPro  5.6[L]  /  Alb  2.3[L]  /  TBili  0.6  /  DBili  x   /  AST  13  /  ALT  <5  /  AlkPhos  104  07-03      PT/INR - ( 01 Jul 2025 11:24 )   PT: 10.90 sec;   INR: 0.93 ratio         PTT - ( 01 Jul 2025 11:24 )  PTT:27.5 sec                                       Urinalysis Basic - ( 03 Jul 2025 05:48 )    Color: x / Appearance: x / SG: x / pH: x  Gluc: 154 mg/dL / Ketone: x  / Bili: x / Urobili: x   Blood: x / Protein: x / Nitrite: x   Leuk Esterase: x / RBC: x / WBC x   Sq Epi: x / Non Sq Epi: x / Bacteria: x                                                  LIVER FUNCTIONS - ( 03 Jul 2025 05:48 )  Alb: 2.3 g/dL / Pro: 5.6 g/dL / ALK PHOS: 104 U/L / ALT: <5 U/L / AST: 13 U/L / GGT: x                                                  Culture - Blood (collected 01 Jul 2025 16:20)  Source: Blood Blood-Catheter  Gram Stain (03 Jul 2025 02:17):    Growth in aerobic bottle: Gram Positive Cocci in Clusters    Growth in anaerobic bottle: Gram Positive Cocci in Clusters  Preliminary Report (03 Jul 2025 02:17):    Growth in aerobic bottle: Gram Positive Cocci in Clusters    Growth in anaerobic bottle: Gram Positive Cocci in Clusters    Culture - Blood (collected 01 Jul 2025 05:48)  Source: Blood None  Gram Stain (03 Jul 2025 06:02):    Growth in aerobic bottle:    Gram Positive Cocci in Clusters    Growth in anaerobic bottle: Gram Positive Cocci in Clusters  Preliminary Report (03 Jul 2025 06:02):    Growth in aerobic bottle:    Gram Positive Cocci in Clusters    Growth in anaerobic bottle: Gram Positive Cocci in Clusters                                                                                       MEDICATIONS  (STANDING):  bacitracin   Ointment 1 Application(s) Topical two times a day  chlorhexidine 2% Cloths 1 Application(s) Topical <User Schedule>  dextrose 5%. 1000 milliLiter(s) (50 mL/Hr) IV Continuous <Continuous>  dextrose 5%. 1000 milliLiter(s) (100 mL/Hr) IV Continuous <Continuous>  dextrose 50% Injectable 25 Gram(s) IV Push once  dextrose 50% Injectable 12.5 Gram(s) IV Push once  dextrose 50% Injectable 25 Gram(s) IV Push once  diltiazem    Tablet 30 milliGRAM(s) Oral every 8 hours  glucagon  Injectable 1 milliGRAM(s) IntraMuscular once  insulin lispro (ADMELOG) corrective regimen sliding scale   SubCutaneous three times a day before meals  nafcillin  IVPB 2 Gram(s) IV Intermittent every 4 hours  pantoprazole    Tablet 40 milliGRAM(s) Oral before breakfast  sertraline 50 milliGRAM(s) Oral daily  tiZANidine 4 milliGRAM(s) Oral at bedtime  vitamin A & D Ointment 1 Application(s) Topical two times a day    MEDICATIONS  (PRN):  acetaminophen     Tablet .. 650 milliGRAM(s) Oral every 6 hours PRN Temp greater or equal to 38C (100.4F), Mild Pain (1 - 3)  dextrose Oral Gel 15 Gram(s) Oral once PRN Blood Glucose LESS THAN 70 milliGRAM(s)/deciliter  HYDROmorphone  Injectable 0.5 milliGRAM(s) IV Push every 3 hours PRN Severe Pain (7 - 10)  metoprolol tartrate Injectable 5 milliGRAM(s) IV Push every 6 hours PRN heart rate above 110      New X-rays reviewed:                                                                                  ECHO

## 2025-07-03 NOTE — PROGRESS NOTE ADULT - ATTENDING COMMENTS
Attending Statement: I have personally performed a face to face diagnostic evaluation on this patient. The patient is suffering from:  MSSA bacteremia- Unclear Source  Multifocal Pneumonia   Thrombocytopenia   Metabolic encephalopathy   Fall with Sacral bruising  CKD 3  Transaminitis  Pancreatic CA on chemo via left sided Chest wall port  Pulmonary Nodules  I have made amendments to the documentation where necessary. I have personally seen and examined this patient.  I have fully participated in the care of this patient.  I have reviewed all pertinent clinical information, including history, physical exam, plan and note. Attending Statement: I have personally performed a face to face diagnostic evaluation on this patient. The patient is suffering from:  MSSA bacteremia- Unclear Source  Multifocal Pneumonia   Thrombocytopenia   Metabolic encephalopathy   Fall with Sacral bruising  CKD 3  Transaminitis  Pancreatic CA on chemo via left sided Chest wall port  Pulmonary Nodules  I have made amendments to the documentation where necessary. I have personally seen and examined this patient.  I have fully participated in the care of this patient.  I have reviewed all pertinent clinical information, including history, physical exam, plan and note..

## 2025-07-03 NOTE — CHART NOTE - NSCHARTNOTEFT_GEN_A_CORE
Provider:                                              Met with: [  x ] Patient  [x   ] Family  [   ] Other:         Primary Language: [ x  ] English [   ] Other*:                      *Interpretation provided by:         SUPPORT DIAGNOSES            (Check all that apply)         [   ] EOL issues    [  x ] Advanced Illness    [   ] Cultural / spiritual concerns    [   ] Pain / suffering    [   ] Dementia / AMS    [   ] Other:    [   ] AD issues    [   ] Grief / loss / sadness    [   ] Discharge issues    [   ] Distress / coping         PSYCHOSOCIAL ASSESSMENT OF PATIENT         (Check all that apply)         [ X  ] Initial Assessment            [   ] Reassessment          [   ] Not Applicable this visit         Pain/suffering acuity:    [x   ] None to mild (0-3)           [   ] Moderate (4-6)        [   ] High (7-10)         Mental Status:    [   ] Alert/oriented (x3)          [  x ] Confused/Altered(x2/x1)         [   ] Non-resp         Functional status:    [   ] Independent w ADLs      [   ] Needs Assistance             [  x ] Bedbound/Full Care         Coping:    [   ] Coping well                     [  ] Coping w/difficulty            [   ] Poor coping   [x] unable to assess         Support system:    [ x  ] Strong                              [   ] Adequate                        [   ] Inadequate              Past history and medications for:         [ ] Anxiety       [ ] Depression    [ ] Sleep disorders              SERVICE PROVIDED    [   ]Discharge support / facilitation    [   ]AD / goals of care counseling                                  [   ]EOL / death / bereavement counseling    [ X  ]Counseling / support                                                [   ] Family meeting    [   ]Prayer / sacrament / ritual                                      [   ] Referral    [   ]Other                                                                           NOTE and Plan of Care (PoC):      80-year-old female with past medical history of hypertension hyperlipidemia DVT on Eliquis recently being treated for pancreatic cancer on chemotherapy with chest port brought to ed for evaluation of the weakness. Patient found to have bacteremia. Palliative care consulted to assist with GOC.   LMSW met with PT and Pt's  at bedside. Palliative Care SW role introduced to Pt's  and  understood. Pt's  notes that he is waiting on results. Pt's  wants to remain hopeful but understands Pt is very sick. We discussed dispo option for Pt if/when she is stable for d/c: home with home care vs home with hospice vs. SNF with hospice.  Pt would not qualify for Medicaid per  and we discussed the option to pvt pay care at home or pvt pay for SNF. PT's  noted he will also contact his insurance to see what PT is eligible for. At this time, Pt's  has not made a decision on hospice and wants to wait for results from workup. Questions answered and supportive counseling provided. Will continue to follow. [phone]

## 2025-07-03 NOTE — PROGRESS NOTE ADULT - ASSESSMENT
This is a 80-year-old female with past medical history of hypertension hyperlipidemia DVT being treated for pancreatic cancer on chemotherapy with chest port brought to ed for evaluation of the weakness.    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    Covid/Flu/RSV negative   MRSA nares negative     RECOMMENDATIONS  -Repeat blood cultures daily.  -Urine cultures noted for E.coli (Sensitive to Cefazolin)  -TTE noted. Tricuspid valve thickening. Can not rule out vegetation. Will need to consider JOSE when able.  -Port explanted 7/2/2025-Pus noted in the cavity   -IV Naficillin 2 gram q 4 hours.   -Local wound care for the sacral injury. Avoid constipation.  -Off loading to prevent pressure sores and preventive measures to avoid aspiration. Very Guarded prognosis. GOC.    If any questions, please send a message or call on [employer] Teams  Please continue to update ID with any pertinent new laboratory or radiographic findings.    Amado Irwin M.D  Infectious Diseases Attending/   [employer] at [facility]

## 2025-07-03 NOTE — PROGRESS NOTE ADULT - SUBJECTIVE AND OBJECTIVE BOX
Subjective/Objective:     HPI-Cardiology/Events/Updates  Pt evaluated at bedside. No overnight events and Pt has no complaints. Radiology tests and hospital records, were reviewed, as well as previous notes on this patient.         MEDICATIONS  (STANDING):  bacitracin   Ointment 1 Application(s) Topical two times a day  chlorhexidine 2% Cloths 1 Application(s) Topical <User Schedule>  dextrose 5%. 1000 milliLiter(s) (50 mL/Hr) IV Continuous <Continuous>  dextrose 5%. 1000 milliLiter(s) (100 mL/Hr) IV Continuous <Continuous>  dextrose 50% Injectable 25 Gram(s) IV Push once  dextrose 50% Injectable 12.5 Gram(s) IV Push once  dextrose 50% Injectable 25 Gram(s) IV Push once  diltiazem    Tablet 30 milliGRAM(s) Oral every 8 hours  enoxaparin Injectable 40 milliGRAM(s) SubCutaneous every 24 hours  glucagon  Injectable 1 milliGRAM(s) IntraMuscular once  insulin lispro (ADMELOG) corrective regimen sliding scale   SubCutaneous three times a day before meals  nafcillin  IVPB 2 Gram(s) IV Intermittent every 4 hours  pantoprazole    Tablet 40 milliGRAM(s) Oral before breakfast  potassium chloride   Powder 40 milliEquivalent(s) Oral once  sertraline 50 milliGRAM(s) Oral daily  tiZANidine 4 milliGRAM(s) Oral at bedtime  vitamin A & D Ointment 1 Application(s) Topical two times a day    MEDICATIONS  (PRN):  acetaminophen     Tablet .. 650 milliGRAM(s) Oral every 6 hours PRN Temp greater or equal to 38C (100.4F), Mild Pain (1 - 3)  dextrose Oral Gel 15 Gram(s) Oral once PRN Blood Glucose LESS THAN 70 milliGRAM(s)/deciliter  HYDROmorphone  Injectable 0.5 milliGRAM(s) IV Push every 3 hours PRN Severe Pain (7 - 10)  metoprolol tartrate Injectable 5 milliGRAM(s) IV Push every 6 hours PRN heart rate above 110          Vital Signs Last 24 Hrs  T(C): 36.1 (03 Jul 2025 07:01), Max: 36.2 (02 Jul 2025 23:46)  T(F): 96.9 (03 Jul 2025 07:01), Max: 97.1 (02 Jul 2025 23:46)  HR: 115 (03 Jul 2025 07:07) (95 - 118)  BP: 123/78 (03 Jul 2025 07:07) (111/93 - 133/74)  BP(mean): 95 (03 Jul 2025 07:07) (80 - 106)  RR: 29 (03 Jul 2025 07:07) (18 - 32)  SpO2: 98% (03 Jul 2025 07:07) (97% - 100%)    Parameters below as of 03 Jul 2025 07:07  Patient On (Oxygen Delivery Method): nasal cannula  O2 Flow (L/min): 3    I&O's Detail    02 Jul 2025 07:01  -  03 Jul 2025 07:00  --------------------------------------------------------  IN:    Enteral Tube Flush: 300 mL    Free Water: 800 mL    Glucerna: 720 mL    IV PiggyBack: 900 mL  Total IN: 2720 mL    OUT:    Indwelling Catheter - Urethral (mL): 770 mL    Intermittent Catheterization - Urethral (mL): 400 mL    Voided (mL): 0 mL  Total OUT: 1170 mL    Total NET: 1550 mL        PHYSICAL EXAM:  GENERAL:  75y/o Female NAD  HEAD:  Atraumatic, Normocephalic  EYES: EOMI, PERRLA, conjunctiva and sclera clear  NECK: Supple, No JVD, no cervical lymphadenopathy, non-tender  CHEST/LUNG: Clear to auscultation bilaterally; No wheeze, rhonchi, or rales  HEART: Regular rate and rhythm; S1&S2  ABDOMEN: Soft, Nontender, Nondistended x 4 quadrants; Bowel sounds present  EXTREMITIES:   Peripheral Pulses Present, No clubbing, no cyanosis, or no edema, no calf tenderness  PSYCH: AAOx3, cooperative, appropriate  NEUROLOGY: WNL  SKIN: WNL        EKG/ TELEM:  < from: 12 Lead ECG (06.28.25 @ 16:41) >  Sinus tachycardia with premature atrial complexes  Undetermined rhythm  Low voltage QRS  Incomplete right bundle branch block  Left anterior fascicular block  Possible Anterolateral infarct , age undetermined  Abnormal ECG    < end of copied text >          LABS:                          8.0    9.22  )-----------( 90       ( 03 Jul 2025 05:48 )             26.0     PT/INR - ( 01 Jul 2025 11:24 )   PT: 10.90 sec;   INR: 0.93 ratio         PTT - ( 01 Jul 2025 11:24 )  PTT:27.5 sec  03 Jul 2025 05:48    143    |  106    |  29[H]  ----------------------------<  154[H]  3.2[L]   |  24     |  0.7    02 Jul 2025 15:17    141    |  106    |  33[H]  ----------------------------<  178[H]  3.8     |  24     |  0.7      Ca    9.0        03 Jul 2025 05:48  Ca    9.1        02 Jul 2025 15:17  Mg     2.1       03 Jul 2025 05:48  Mg     2.2       02 Jul 2025 05:42    TPro  5.6[L]  /  Alb  2.3[L]  /  TBili  0.6    /  DBili  x      /  AST  13     /  ALT  <5     /  AlkPhos  104    03 Jul 2025 05:48  TPro  5.2[L]  /  Alb  2.1[L]  /  TBili  0.7    /  DBili  x      /  AST  13     /  ALT  <5     /  AlkPhos  103    02 Jul 2025 05:42          Diagnostic testing:  < from: Xray Chest 1 View AP/PA (07.03.25 @ 07:52) >  IMPRESSION: Low lung volume.    Left basilar opacity, unchanged and new right basilar opacity.    NGT.    --- End of Report ---    < end of copied text >              < from: TTE Echo Complete w/o Contrast w/ Doppler (06.30.25 @ 11:35) >  CONCLUSIONS:     1. Left ventricular systolic function is normal with an ejection   fraction visually estimated at 55 to 60 %.   2. The left ventricular diastolic function is indeterminate.   3. Normal right ventricular cavity size, with normal wall thickness, and   normal right ventricular systolic function. Tricuspid annular tissue   Doppler S' is 25.3 cm/s (normal >10 cm/s).   4. Normal left and right atrial size.   5. Aortic valve was not well visualized.   6. There is mildcalcification of the mitral valve annulus.   7. Trace mitral regurgitation at a blood pressure of 118/57 mmHg.   8. Possible thickening of tricuspid valve. Cannot rule out vegetation.   9. Aortic root at the sinuses of Valsalva is dilated, measuring 3.80 cm   (indexed 2.25 cm/m²).  10. Estimated pulmonary artery systolic pressure is 29 mmHg, consistent   with normal pulmonary artery pressure.  11. Small pericardial effusion noted adjacent to the anterior right   ventricle with no echocardiographic evidence of tamponade physiology.    < end of copied text >        Assessment and Recommendations:  80-year-old female with past medical history of hypertension hyperlipidemia DVT on Eliquis recently being treated for pancreatic cancer on chemotherapy with chest port brought to ed for evaluation of the weakness.    OUTPATIENT CARDIOLOGIST:  Beatrice johnson Patient lethargic, in NAD    IMPRESSION:  #MSSA bacteremia- Unclear Source possible chemoport  #Multifocal Pneumonia   #Thrombocytopenia s/p platelets  #Pancreatic CA on chemo via left sided Chest wall port/ possible surgical removal today  #AFib c RVR?  Telemetry appears to be sinus w/ PACs  EKG: Sinus tachycardia with premature atrial complexes  TTE 6/30/2025: Normal LVSF, EF 55-60%, Small pericardial effusion noted adjacent to the anterior right ventricle with no echocardiographic evidence of tamponade physiology. Possible thickening of tricuspid valve. Cannot rule out vegetation.  Will consider JOSE when medically optimized and if within GOC. Pt currently DNR/DNI. Also per Pt's spouse at bedside, they would prefer conservative medical management    Cont with medical management per primary team

## 2025-07-03 NOTE — PROGRESS NOTE ADULT - ASSESSMENT
IMPRESSION:    MSSA bacteremia- Unclear Source, port removed   Multifocal Pneumonia   Thrombocytopenia   Anemia   Metabolic encephalopathy   Fall with Sacral bruising  CKD 3  Transaminitis  Pancreatic CA on chemo via left sided Chest wall port  Pulmonary Nodules  HO HTN    PLAN:      CNS: avoid sedation, CTH and C spine noted     HEENT: Oral care    PULMONARY:  HOB @ 45 degrees. Aspiration precautions, wean oxygen, CXR and Ct chest noted with PNA and possible metastatic spread, Incentive tomás     CARDIOVASCULAR: avoid volume overload, MAP adequate, f/u echo noted, cannot r/o tricuspid vegetation may need JOSE for eval, DC LR fluids, bolus as needed       GI: GI prophylaxis. Feeding after SLP eval, NG tube      RENAL: Follow up lytes. Correct as needed, f/u UO, place [facility] if retaining     INFECTIOUS DISEASE: Follow up cultures, procalcitonin, repeat until clearance, f/u MRSA nares and RVP negative, deescalate to Nafacillin, port removal today     HEMATOLOGICAL: Trend platelets, hold DVT PPX if <50, s/p platelet transfusion, start DVT PPX today, repeat labs at 11       ENDOCRINE:  Follow up FS. Insulin protocol if needed.    MUSCULOSKELETAL: ambulate as tolerated    SDU  Pallative eval   Poor prognosis      IMPRESSION:    MSSA bacteremia- Unclear Source, port removed   Multifocal Pneumonia   Thrombocytopenia   Anemia   Metabolic encephalopathy   Fall with Sacral bruising  CKD 3  Transaminitis  Pancreatic CA on chemo via left sided Chest wall port  Pulmonary Nodules  HO HTN    PLAN:      CNS: avoid sedation, CTH and C spine noted     HEENT: Oral care    PULMONARY:  HOB @ 45 degrees. Aspiration precautions, wean oxygen, CXR and Ct chest noted with PNA and possible metastatic spread, Incentive tomás     CARDIOVASCULAR: avoid volume overload, MAP adequate, f/u echo noted, cannot r/o tricuspid vegetation may need JOSE for eval, DC LR fluids, bolus as needed       GI: GI prophylaxis. Feeding after SLP eval, NG tube      RENAL: Follow up lytes. Correct as needed, f/u UO, place [facility] if retaining     INFECTIOUS DISEASE: Follow up cultures, procalcitonin, repeat until clearance, f/u MRSA nares and RVP negative, deescalate to Nafacillin, port removal today     HEMATOLOGICAL: Trend platelets, hold DVT PPX if <50, s/p platelet transfusion, start DVT PPX today, repeat labs at 11       ENDOCRINE:  Follow up FS. Insulin protocol if needed.    MUSCULOSKELETAL: ambulate as tolerated    Tele   Pallative eval   Poor prognosis

## 2025-07-03 NOTE — CHART NOTE - NSCHARTNOTEFT_GEN_A_CORE
Transfer From: ICU  Transfer To: Telemetry      Patient is a 74y old  Female who presents with a chief complaint of weakness (03 Jul 2025 10:19)      HPI:    80-year-old female with past medical history of hypertension hyperlipidemia DVT on Eliquis recently being treated for pancreatic cancer on chemotherapy with chest port brought to ed for evaluation of the weakness. Hx taken from the patient  at bedside. States that for the past few days, patient has been very weak, dehydrated, not eating and drinking well, unable to walk and unable to take care of her self. Also reported that patient has been more sleepy recently and had a fall a few back.  said she normally has some dementia however the last 3 days, Patient was recently becoming more demented prior to the fall.  Patient complaining of diffuse body pain afterwards  (27 Jun 2025 15:17)      ------------------------------------------------------------------------  ICU Course:  - patient was found to have MSSA bacteremia, abx narrowed to Nafcillin; Chemo port removed 7/2 with pus noted in the cavity  - course c/b tachycardia, cardiology evaluated patinet found to have premature atrial complexes; TTE showed thickening of tricuspid valve; vetgation not ruled out  - GOC discussed with patient's ; pt is DNR/DNI. Given patient's overall poor prognosis there is strong consideration to treat empirically and avoid JOSE  - for thrombocytopenia, patient was transfused 1 plt, AC restarted 7/3    ------------------------------------------------------------------------  To Follow:  - f/u daily cultures, ID recs  - monitor HR on Tele, EKGs, c/w cardizem PO  - monitor for bleed, keep t&S active      MSSA bacteremia- Unclear Source, port removed   Multifocal Pneumonia   Thrombocytopenia   Anemia   Metabolic encephalopathy   Fall with Sacral bruising  CKD 3  Transaminitis  Pancreatic CA on chemo via left sided Chest wall port  Pulmonary Nodules  HO HTN    PLAN:      CNS: avoid sedation, CTH and C spine noted     HEENT: Oral care    PULMONARY:  HOB @ 45 degrees. Aspiration precautions, wean oxygen, CXR and Ct chest noted with PNA and possible metastatic spread, Incentive tomás     CARDIOVASCULAR: avoid volume overload, MAP adequate, f/u echo noted, cannot r/o tricuspid vegetation may need JOSE for eval, DC LR fluids, bolus as needed       GI: GI prophylaxis. Feeding after SLP eval, NG tube      RENAL: Follow up lytes. Correct as needed, f/u UO, place Aguilera if retaining     INFECTIOUS DISEASE: Follow up cultures, procalcitonin, repeat until clearance, f/u MRSA nares and RVP negative, deescalate to Nfacillin, s/p port removal     HEMATOLOGICAL: Trend platelets, hold DVT PPX if <50, s/p platelet transfusion, start DVT PPX today, repeat labs at 11       ENDOCRINE:  Follow up FS. Insulin protocol if needed.    MUSCULOSKELETAL: ambulate as tolerated    Tele   Pallative eval   Poor prognosis         MEDICATIONS:  acetaminophen     Tablet .. 650 milliGRAM(s) Oral every 6 hours PRN  bacitracin   Ointment 1 Application(s) Topical two times a day  chlorhexidine 2% Cloths 1 Application(s) Topical <User Schedule>  dextrose 5%. 1000 milliLiter(s) IV Continuous <Continuous>  dextrose 5%. 1000 milliLiter(s) IV Continuous <Continuous>  dextrose 50% Injectable 25 Gram(s) IV Push once  dextrose 50% Injectable 12.5 Gram(s) IV Push once  dextrose 50% Injectable 25 Gram(s) IV Push once  dextrose Oral Gel 15 Gram(s) Oral once PRN  diltiazem    Tablet 30 milliGRAM(s) Oral every 8 hours  enoxaparin Injectable 40 milliGRAM(s) SubCutaneous every 24 hours  glucagon  Injectable 1 milliGRAM(s) IntraMuscular once  HYDROmorphone  Injectable 0.5 milliGRAM(s) IV Push every 3 hours PRN  insulin lispro (ADMELOG) corrective regimen sliding scale   SubCutaneous three times a day before meals  metoprolol tartrate Injectable 5 milliGRAM(s) IV Push every 6 hours PRN  nafcillin  IVPB 2 Gram(s) IV Intermittent every 4 hours  pantoprazole    Tablet 40 milliGRAM(s) Oral before breakfast  potassium chloride   Powder 40 milliEquivalent(s) Oral once  sertraline 50 milliGRAM(s) Oral daily  tiZANidine 4 milliGRAM(s) Oral at bedtime  vitamin A & D Ointment 1 Application(s) Topical two times a day      T(C): 36.1 (07-03-25 @ 07:01), Max: 36.2 (07-02-25 @ 23:46)  HR: 115 (07-03-25 @ 07:07) (95 - 118)  BP: 123/78 (07-03-25 @ 07:07) (111/93 - 133/74)  RR: 29 (07-03-25 @ 07:07) (18 - 32)  SpO2: 98% (07-03-25 @ 07:07) (97% - 100%)  Wt(kg): --Vital Signs Last 24 Hrs  T(C): 36.1 (03 Jul 2025 07:01), Max: 36.2 (02 Jul 2025 23:46)  T(F): 96.9 (03 Jul 2025 07:01), Max: 97.1 (02 Jul 2025 23:46)  HR: 115 (03 Jul 2025 07:07) (95 - 118)  BP: 123/78 (03 Jul 2025 07:07) (111/93 - 133/74)  BP(mean): 95 (03 Jul 2025 07:07) (80 - 106)  RR: 29 (03 Jul 2025 07:07) (18 - 32)  SpO2: 98% (03 Jul 2025 07:07) (97% - 100%)    Parameters below as of 03 Jul 2025 07:07  Patient On (Oxygen Delivery Method): nasal cannula  O2 Flow (L/min): 3    ------------------------------------------------------------------------  3-day Trends:  Potassium: 2.7 mmol/L *LL* (07-02-25 @ 05:42)  Potassium: 3.8 mmol/L (07-02-25 @ 15:17)  Potassium: 3.2 mmol/L *L* (07-03-25 @ 05:48)    Creatinine: 0.7 mg/dL (07-02-25 @ 05:42)  Creatinine: 0.7 mg/dL (07-02-25 @ 15:17)  Creatinine: 0.7 mg/dL (07-03-25 @ 05:48)    Hemoglobin: 7.8 g/dL *L* (07-02-25 @ 05:42)  Hemoglobin: 8.6 g/dL *L* (07-02-25 @ 11:40)  Hemoglobin: 8.0 g/dL *L* (07-03-25 @ 05:48)    Platelet Count - Automated: 78 K/uL *L* (07-02-25 @ 05:42)  Platelet Count - Automated: 86 K/uL *L* (07-02-25 @ 11:40)  Platelet Count - Automated: 90 K/uL *L* (07-03-25 @ 05:48)      WBC Count: 7.55 K/uL (07-02-25 @ 05:42)  WBC Count: 8.44 K/uL (07-02-25 @ 11:40)  WBC Count: 9.22 K/uL (07-03-25 @ 05:48)    Lactate, Blood: 2.2 mmol/L *H* (06-28-25 @ 16:04)  Lactate, Blood: 2.3 mmol/L *H* (06-29-25 @ 05:49)  Lactate, Blood: 2.4 mmol/L *H* (06-29-25 @ 15:44)          PHYSICAL EXAM:  GENERAL: ill-appearing  HEAD:  Atraumatic, normocephalic  EYES: EOMI, PERRLA, conjunctiva and sclera clear  NECK: Supple, no JVD  HEART: Regular rate and rhythm, no murmurs, rubs, or gallops  LUNGS: diminished bs  ABDOMEN: Soft, nontender, nondistended, +BS  EXTREMITIES: bilaterally. No clubbing, cyanosis, or edema  NERVOUS SYSTEM:  A&Ox1,               LABS:                        8.0    9.22  )-----------( 90 ( 03 Jul 2025 05:48 )             26.0     07-03    143  |  106  |  29[H]  ----------------------------<  154[H]  3.2[L]   |  24  |  0.7    Ca    9.0      03 Jul 2025 05:48  Mg     2.1     07-03    TPro  5.6[L]  /  Alb  2.3[L]  /  TBili  0.6  /  DBili  x   /  AST  13  /  ALT  <5  /  AlkPhos  104  07-03    PT/INR - ( 01 Jul 2025 11:24 )   PT: 10.90 sec;   INR: 0.93 ratio         PTT - ( 01 Jul 2025 11:24 )  PTT:27.5 sec  Urinalysis Basic - ( 03 Jul 2025 05:48 )    Color: x / Appearance: x / SG: x / pH: x  Gluc: 154 mg/dL / Ketone: x  / Bili: x / Urobili: x   Blood: x / Protein: x / Nitrite: x   Leuk Esterase: x / RBC: x / WBC x   Sq Epi: x / Non Sq Epi: x / Bacteria: x      CAPILLARY BLOOD GLUCOSE      POCT Blood Glucose.: 181 mg/dL (03 Jul 2025 06:45)  POCT Blood Glucose.: 107 mg/dL (02 Jul 2025 22:03)  POCT Blood Glucose.: 202 mg/dL (02 Jul 2025 16:34)  POCT Blood Glucose.: 225 mg/dL (02 Jul 2025 11:24)        Urinalysis Basic - ( 03 Jul 2025 05:48 )    Color: x / Appearance: x / SG: x / pH: x  Gluc: 154 mg/dL / Ketone: x  / Bili: x / Urobili: x   Blood: x / Protein: x / Nitrite: x   Leuk Esterase: x / RBC: x / WBC x   Sq Epi: x / Non Sq Epi: x / Bacteria: x        RADIOLOGY & ADDITIONAL TESTS: Transfer From: ICU  Transfer To: Telemetry      Patient is a 74y old  Female who presents with a chief complaint of weakness (03 Jul 2025 10:19)      HPI:    80-year-old female with past medical history of hypertension hyperlipidemia DVT on Eliquis recently being treated for pancreatic cancer on chemotherapy with chest port brought to ed for evaluation of the weakness. Hx taken from the patient  at bedside. States that for the past few days, patient has been very weak, dehydrated, not eating and drinking well, unable to walk and unable to take care of her self. Also reported that patient has been more sleepy recently and had a fall a few back.  said she normally has some dementia however the last 3 days, Patient was recently becoming more demented prior to the fall.  Patient complaining of diffuse body pain afterwards  (27 Jun 2025 15:17)      ------------------------------------------------------------------------  ICU Course:  - patient was found to have MSSA bacteremia, abx narrowed to Nafcillin; Chemo port removed 7/2 with pus noted in the cavity  - course c/b tachycardia, cardiology evaluated patient found to have premature atrial complexes; TTE showed thickening of tricuspid valve; vetgation not ruled out  - GOC discussed with patient's ; pt is DNR/DNI. Given patient's overall poor prognosis there is strong consideration to treat empirically and avoid JOSE, however if pt continues to clinically improve JOSE can be considered.  - for thrombocytopenia, patient was transfused 1 plt, AC restarted 7/3, Heme Onc was consulted    ------------------------------------------------------------------------  To Follow:  - f/u daily cultures, ID recs  - monitor HR on Tele, EKGs, c/w cardizem PO  - monitor for bleed, keep t&S active      MSSA bacteremia- Unclear Source, port removed   Multifocal Pneumonia   Thrombocytopenia   Anemia   Metabolic encephalopathy   Fall with Sacral bruising  CKD 3  Transaminitis  Pancreatic CA on chemo via left sided Chest wall port  Pulmonary Nodules  HO HTN    PLAN:      CNS: avoid sedation, CTH and C spine noted     HEENT: Oral care    PULMONARY:  HOB @ 45 degrees. Aspiration precautions, wean oxygen, CXR and Ct chest noted with PNA and possible metastatic spread, Incentive tomás     CARDIOVASCULAR: avoid volume overload, MAP adequate, f/u echo noted, cannot r/o tricuspid vegetation may need JOSE for eval, DC LR fluids, bolus as needed       GI: GI prophylaxis. Feeding after SLP eval, NG tube      RENAL: Follow up lytes. Correct as needed, f/u UO, place Aguilera if retaining     INFECTIOUS DISEASE: Follow up cultures, procalcitonin, repeat until clearance, f/u MRSA nares and RVP negative, deescalate to Nfacillin, s/p port removal     HEMATOLOGICAL: Trend platelets, hold DVT PPX if <50, s/p platelet transfusion, start DVT PPX today, repeat labs at 11       ENDOCRINE:  Follow up FS. Insulin protocol if needed.    MUSCULOSKELETAL: ambulate as tolerated    Tele   Pallative eval   Poor prognosis         MEDICATIONS:  acetaminophen     Tablet .. 650 milliGRAM(s) Oral every 6 hours PRN  bacitracin   Ointment 1 Application(s) Topical two times a day  chlorhexidine 2% Cloths 1 Application(s) Topical <User Schedule>  dextrose 5%. 1000 milliLiter(s) IV Continuous <Continuous>  dextrose 5%. 1000 milliLiter(s) IV Continuous <Continuous>  dextrose 50% Injectable 25 Gram(s) IV Push once  dextrose 50% Injectable 12.5 Gram(s) IV Push once  dextrose 50% Injectable 25 Gram(s) IV Push once  dextrose Oral Gel 15 Gram(s) Oral once PRN  diltiazem    Tablet 30 milliGRAM(s) Oral every 8 hours  enoxaparin Injectable 40 milliGRAM(s) SubCutaneous every 24 hours  glucagon  Injectable 1 milliGRAM(s) IntraMuscular once  HYDROmorphone  Injectable 0.5 milliGRAM(s) IV Push every 3 hours PRN  insulin lispro (ADMELOG) corrective regimen sliding scale   SubCutaneous three times a day before meals  metoprolol tartrate Injectable 5 milliGRAM(s) IV Push every 6 hours PRN  nafcillin  IVPB 2 Gram(s) IV Intermittent every 4 hours  pantoprazole    Tablet 40 milliGRAM(s) Oral before breakfast  potassium chloride   Powder 40 milliEquivalent(s) Oral once  sertraline 50 milliGRAM(s) Oral daily  tiZANidine 4 milliGRAM(s) Oral at bedtime  vitamin A & D Ointment 1 Application(s) Topical two times a day      T(C): 36.1 (07-03-25 @ 07:01), Max: 36.2 (07-02-25 @ 23:46)  HR: 115 (07-03-25 @ 07:07) (95 - 118)  BP: 123/78 (07-03-25 @ 07:07) (111/93 - 133/74)  RR: 29 (07-03-25 @ 07:07) (18 - 32)  SpO2: 98% (07-03-25 @ 07:07) (97% - 100%)  Wt(kg): --Vital Signs Last 24 Hrs  T(C): 36.1 (03 Jul 2025 07:01), Max: 36.2 (02 Jul 2025 23:46)  T(F): 96.9 (03 Jul 2025 07:01), Max: 97.1 (02 Jul 2025 23:46)  HR: 115 (03 Jul 2025 07:07) (95 - 118)  BP: 123/78 (03 Jul 2025 07:07) (111/93 - 133/74)  BP(mean): 95 (03 Jul 2025 07:07) (80 - 106)  RR: 29 (03 Jul 2025 07:07) (18 - 32)  SpO2: 98% (03 Jul 2025 07:07) (97% - 100%)    Parameters below as of 03 Jul 2025 07:07  Patient On (Oxygen Delivery Method): nasal cannula  O2 Flow (L/min): 3    ------------------------------------------------------------------------  3-day Trends:  Potassium: 2.7 mmol/L *LL* (07-02-25 @ 05:42)  Potassium: 3.8 mmol/L (07-02-25 @ 15:17)  Potassium: 3.2 mmol/L *L* (07-03-25 @ 05:48)    Creatinine: 0.7 mg/dL (07-02-25 @ 05:42)  Creatinine: 0.7 mg/dL (07-02-25 @ 15:17)  Creatinine: 0.7 mg/dL (07-03-25 @ 05:48)    Hemoglobin: 7.8 g/dL *L* (07-02-25 @ 05:42)  Hemoglobin: 8.6 g/dL *L* (07-02-25 @ 11:40)  Hemoglobin: 8.0 g/dL *L* (07-03-25 @ 05:48)    Platelet Count - Automated: 78 K/uL *L* (07-02-25 @ 05:42)  Platelet Count - Automated: 86 K/uL *L* (07-02-25 @ 11:40)  Platelet Count - Automated: 90 K/uL *L* (07-03-25 @ 05:48)      WBC Count: 7.55 K/uL (07-02-25 @ 05:42)  WBC Count: 8.44 K/uL (07-02-25 @ 11:40)  WBC Count: 9.22 K/uL (07-03-25 @ 05:48)    Lactate, Blood: 2.2 mmol/L *H* (06-28-25 @ 16:04)  Lactate, Blood: 2.3 mmol/L *H* (06-29-25 @ 05:49)  Lactate, Blood: 2.4 mmol/L *H* (06-29-25 @ 15:44)          PHYSICAL EXAM:  GENERAL: ill-appearing  HEAD:  Atraumatic, normocephalic  EYES: EOMI, PERRLA, conjunctiva and sclera clear  NECK: Supple, no JVD  HEART: Regular rate and rhythm, no murmurs, rubs, or gallops  LUNGS: diminished bs  ABDOMEN: Soft, nontender, nondistended, +BS  EXTREMITIES: bilaterally. No clubbing, cyanosis, or edema  NERVOUS SYSTEM:  A&Ox1,           LABS:                        8.0    9.22  )-----------( 90 ( 03 Jul 2025 05:48 )             26.0     07-03    143  |  106  |  29[H]  ----------------------------<  154[H]  3.2[L]   |  24  |  0.7    Ca    9.0      03 Jul 2025 05:48  Mg     2.1     07-03    TPro  5.6[L]  /  Alb  2.3[L]  /  TBili  0.6  /  DBili  x   /  AST  13  /  ALT  <5  /  AlkPhos  104  07-03    PT/INR - ( 01 Jul 2025 11:24 )   PT: 10.90 sec;   INR: 0.93 ratio         PTT - ( 01 Jul 2025 11:24 )  PTT:27.5 sec  Urinalysis Basic - ( 03 Jul 2025 05:48 )    Color: x / Appearance: x / SG: x / pH: x  Gluc: 154 mg/dL / Ketone: x  / Bili: x / Urobili: x   Blood: x / Protein: x / Nitrite: x   Leuk Esterase: x / RBC: x / WBC x   Sq Epi: x / Non Sq Epi: x / Bacteria: x      CAPILLARY BLOOD GLUCOSE      POCT Blood Glucose.: 181 mg/dL (03 Jul 2025 06:45)  POCT Blood Glucose.: 107 mg/dL (02 Jul 2025 22:03)  POCT Blood Glucose.: 202 mg/dL (02 Jul 2025 16:34)  POCT Blood Glucose.: 225 mg/dL (02 Jul 2025 11:24)        Urinalysis Basic - ( 03 Jul 2025 05:48 )    Color: x / Appearance: x / SG: x / pH: x  Gluc: 154 mg/dL / Ketone: x  / Bili: x / Urobili: x   Blood: x / Protein: x / Nitrite: x   Leuk Esterase: x / RBC: x / WBC x   Sq Epi: x / Non Sq Epi: x / Bacteria: x        RADIOLOGY & ADDITIONAL TESTS:

## 2025-07-03 NOTE — PROGRESS NOTE ADULT - SUBJECTIVE AND OBJECTIVE BOX
LINDA ONEAL  74y, Female    LOS  6d    INTERVAL EVENTS/HPI  - T(F): , Max: 97.1 (07-02-25 @ 23:46)  - WBC Count: 9.22 (07-03-25 @ 05:48)  WBC Count: 8.44 (07-02-25 @ 11:40)  - Creatinine: 0.7 (07-03-25 @ 05:48)  Creatinine: 0.7 (07-02-25 @ 15:17)    REVIEW OF SYSTEMS:  CONSTITUTIONAL: No fever or chills  HEENT: No sore throat  RESPIRATORY: No cough, no shortness of breath  CARDIOVASCULAR: No chest pain or palpitations  GASTROINTESTINAL: No abdominal or epigastric pain  GENITOURINARY: No dysuria  NEUROLOGICAL: No headache/dizziness  MSK: No joint pain, erythema, or swelling; no back pain  SKIN: No itching, rashes  All other ROS negative except noted above    Prior hospital charts reviewed [Yes]  Primary team notes reviewed [Yes]  Other consultant notes reviewed [Yes]    ANTIMICROBIALS:   nafcillin  IVPB 2 every 4 hours      OTHER MEDS: MEDICATIONS  (STANDING):  acetaminophen     Tablet .. 650 every 6 hours PRN  dextrose 50% Injectable 25 once  dextrose 50% Injectable 12.5 once  dextrose 50% Injectable 25 once  dextrose Oral Gel 15 once PRN  diltiazem    Tablet 30 every 8 hours  enoxaparin Injectable 40 every 24 hours  glucagon  Injectable 1 once  HYDROmorphone  Injectable 0.5 every 3 hours PRN  insulin lispro (ADMELOG) corrective regimen sliding scale  three times a day before meals  metoprolol tartrate Injectable 5 every 6 hours PRN  pantoprazole    Tablet 40 before breakfast  sertraline 50 daily  tiZANidine 4 at bedtime      Vital Signs Last 24 Hrs  T(F): 96.9 (07-03-25 @ 07:01), Max: 97.4 (07-01-25 @ 15:02)    Vital Signs Last 24 Hrs  HR: 115 (07-03-25 @ 07:07) (95 - 118)  BP: 123/78 (07-03-25 @ 07:07) (111/93 - 133/74)  RR: 29 (07-03-25 @ 07:07)  SpO2: 98% (07-03-25 @ 07:07) (97% - 100%)  Wt(kg): --    EXAM:  GENERAL: In NAD, On NC  HEAD: Port Explanted. Site area C/D/I  NECK: Supple  CHEST/LUNG: Shallow breath sounds.   HEART: S1 S2  ABDOMEN: Soft, nontender  EXTREMITIES: No clubbing  NERVOUS SYSTEM: Awake. Confused  MSK: No joint erythema, swelling or pain    Labs:                        8.0    9.22  )-----------( 90       ( 03 Jul 2025 05:48 )             26.0     07-03    143  |  106  |  29[H]  ----------------------------<  154[H]  3.2[L]   |  24  |  0.7    Ca    9.0      03 Jul 2025 05:48  Mg     2.1     07-03    TPro  5.6[L]  /  Alb  2.3[L]  /  TBili  0.6  /  DBili  x   /  AST  13  /  ALT  <5  /  AlkPhos  104  07-03      WBC Trend:  WBC Count: 9.22 (07-03-25 @ 05:48)  WBC Count: 8.44 (07-02-25 @ 11:40)  WBC Count: 7.55 (07-02-25 @ 05:42)  WBC Count: 6.91 (07-01-25 @ 11:24)      Creatine Trend:  Creatinine: 0.7 (07-03)  Creatinine: 0.7 (07-02)  Creatinine: 0.7 (07-02)  Creatinine: 0.8 (07-01)      Liver Biochemical Testing Trend:  Alanine Aminotransferase (ALT/SGPT): <5 (07-03)  Alanine Aminotransferase (ALT/SGPT): <5 (07-02)  Alanine Aminotransferase (ALT/SGPT): 6 (07-01)  Alanine Aminotransferase (ALT/SGPT): 15 (06-30)  Alanine Aminotransferase (ALT/SGPT): 37 (06-29)  Aspartate Aminotransferase (AST/SGOT): 13 (07-03-25 @ 05:48)  Aspartate Aminotransferase (AST/SGOT): 13 (07-02-25 @ 05:42)  Aspartate Aminotransferase (AST/SGOT): 20 (07-01-25 @ 05:48)  Aspartate Aminotransferase (AST/SGOT): 24 (06-30-25 @ 05:46)  Aspartate Aminotransferase (AST/SGOT): 44 (06-29-25 @ 05:49)  Bilirubin Total: 0.6 (07-03)  Bilirubin Total: 0.7 (07-02)  Bilirubin Total: 0.9 (07-01)  Bilirubin Total: 0.6 (06-30)  Bilirubin Total: 0.6 (06-29)      Trend LDH      Urinalysis Basic - ( 03 Jul 2025 05:48 )    Color: x / Appearance: x / SG: x / pH: x  Gluc: 154 mg/dL / Ketone: x  / Bili: x / Urobili: x   Blood: x / Protein: x / Nitrite: x   Leuk Esterase: x / RBC: x / WBC x   Sq Epi: x / Non Sq Epi: x / Bacteria: x        MICROBIOLOGY:    Female    Culture - Blood (collected 02 Jul 2025 05:42)  Source: Blood None  Preliminary Report:    Growth in aerobic bottle: Gram Positive Cocci in Clusters    Culture - Blood (collected 01 Jul 2025 16:20)  Source: Blood Blood-Catheter  Preliminary Report:    Growth in aerobic bottle: Gram Positive Cocci in Clusters    Growth in anaerobic bottle: Gram Positive Cocci in Clusters    Culture - Blood (collected 01 Jul 2025 05:48)  Source: Blood None  Preliminary Report:    Growth in aerobic bottle:    Gram Positive Cocci in Clusters    Growth in anaerobic bottle: Gram Positive Cocci in Clusters    Culture - Blood (collected 30 Jun 2025 05:46)  Source: Blood None  Preliminary Report:    No growth at 48 Hours    Culture - Blood (collected 29 Jun 2025 05:49)  Source: Blood None  Final Report:    Growth in aerobic and anaerobic bottles: Staphylococcus aureus    See previous culture [id number]    Culture - Blood (collected 29 Jun 2025 05:49)  Source: Blood None  Final Report:    Growth in aerobic and anaerobic bottles: Staphylococcus aureus    See previous culture  [id number]    Urinalysis with Rflx Culture (collected 27 Jun 2025 10:20)    Culture - Urine (collected 27 Jun 2025 10:20)  Source: Clean Catch None  Final Report:    >100,000 CFU/ml Escherichia coli    <10,000 CFU/ml Normal Urogenital pee present  Organism: Escherichia coli  Organism: Escherichia coli    Sensitivities:      -  Levofloxacin: S <=0.5      -  Tobramycin: S <=2      -  Nitrofurantoin: S <=32 Should not be used to treat pyelonephritis      -  Aztreonam: S <=4      -  Gentamicin: S <=2      -  Cefazolin: S 8 For uncomplicated UTI with K. pneumoniae, E. coli, or P. mirablis: AUGUSTINE <=16 is sensitive and AUGUSTINE >=32 is resistant. This also predicts results for oral agents cefaclor, cefdinir, cefpodoxime, cefprozil, cefuroxime axetil, cephalexin and locarbef for uncomplicated UTI. Note that some isolates may be susceptible to these agents while testing resistant to cefazolin.      -  Cefepime: S <=2      -  Piperacillin/Tazobactam: R 32      -  Ciprofloxacin: S <=0.25      -  Imipenem: S <=1      -  Ceftriaxone: S <=1      -  Ampicillin: R >16 These ampicillin results predict results for amoxicillin      Method Type: AUGUSTINE      -  Meropenem: S <=1      -  Ampicillin/Sulbactam: R >16/8      -  Cefoxitin: S <=8      -  Cefuroxime: S 8      -  Amoxicillin/Clavulanic Acid: R >16/8      -  Trimethoprim/Sulfamethoxazole: R >2/38      -  Tigecycline: S <=2 Interpretations based on FDA breakpoints      -  Ertapenem: S <=0.5    Culture - Blood (collected 27 Jun 2025 09:30)  Source: Blood Blood-Peripheral  Final Report:    Growth in aerobic and anaerobic bottles: Staphylococcus aureus    Direct identification is available within approximately 3-5    hours either by Blood Panel Multiplexed PCR or Direct    MALDI-TOF. Details: [email or web address]  Organism: Blood Culture PCR  Staphylococcus aureus  Organism: Blood Culture PCR    Sensitivities:      Method Type: PCR      -  Methicillin SENSITIVE Staphylococcus aureus (MSSA): Detec Any isolate of Staphylococcus aureus from a blood culture is NOT considered a contaminant.  Organism: Staphylococcus aureus    Sensitivities:      -  Clindamycin: S <=0.25      -  Oxacillin: S 0.5 Oxacillin predicts susceptibility for dicloxacillin, methicillin, and nafcillin      -  Gentamicin: S <=4 Should not be used as monotherapy      -  Vancomycin: S 1      -  Tetracycline: S <=4      Method Type: AUGUSTINE      -  Penicillin: R >2      -  Rifampin: S <=1 Should not be used as monotherapy      -  Erythromycin: S <=0.25      -  Trimethoprim/Sulfamethoxazole: S <=0.5/9.5    Culture - Blood (collected 27 Jun 2025 09:30)  Source: Blood Blood-Peripheral  Final Report:    Growth in aerobic and anaerobic bottles: Staphylococcus aureus    See previous culture [id number]                              Procalcitonin: 0.97 (06-27)        D-Dimer Assay, Quantitative: 2085 (07-01)  D-Dimer Assay, Quantitative: 863 (06-28)                RADIOLOGY & ADDITIONAL TESTS:  I have personally reviewed the relevant images.   CXR  Xray Chest 1 View AP/PA:   ACC: [id number] EXAM:  XR CHEST 1 VIEW   ORDERED BY: POONAM GERBER     PROCEDURE DATE:  07/03/2025          INTERPRETATION:  CLINICAL HISTORY: Follow-up.    COMPARISON: July 2, 2025.    TECHNIQUE: Portable frontal chest radiograph. Low lung volume.    FINDINGS:    Support devices: NGT with its tip below the diaphragm.    Cardiac/mediastinum/hilum: Stable.    Lung parenchyma/Pleura: Left basilar opacity, unchanged and new right   basilar opacity. No pneumothorax seen.    Skeleton/soft tissues: Stable.      IMPRESSION: Low lung volume.    Left basilar opacity, unchanged and new right basilar opacity.    NGT.    --- End of Report ---            ALYSSA MOSELEY MD; Attending Radiologist  This document has been electronically signed. Jul  3 2025  7:54AM (07-03-25 @ 07:52)      CT        WEIGHT  Weight (kg): 58.2 (06-27-25 @ 18:21)  Creatinine: 0.7 mg/dL (07-03-25 @ 05:48)  Creatinine: 0.7 mg/dL (07-02-25 @ 15:17)      All available historical records have been reviewed       LINDA ONEAL  74y, Female    LOS  6d    INTERVAL EVENTS/HPI  - T(F): , Max: 97.1 (07-02-25 @ 23:46)  - WBC Count: 9.22 (07-03-25 @ 05:48)  WBC Count: 8.44 (07-02-25 @ 11:40)  - Creatinine: 0.7 (07-03-25 @ 05:48)  Creatinine: 0.7 (07-02-25 @ 15:17)    REVIEW OF SYSTEMS:  CONSTITUTIONAL: No fever or chills  HEENT: No sore throat  RESPIRATORY: No cough, no shortness of breath  CARDIOVASCULAR: No chest pain or palpitations  GASTROINTESTINAL: No abdominal or epigastric pain  GENITOURINARY: No dysuria  NEUROLOGICAL: No headache/dizziness  MSK: No joint pain, erythema, or swelling; no back pain  SKIN: No itching, rashes  All other ROS negative except noted above    Prior hospital charts reviewed [Yes]  Primary team notes reviewed [Yes]  Other consultant notes reviewed [Yes]    ANTIMICROBIALS:   nafcillin  IVPB 2 every 4 hours      OTHER MEDS: MEDICATIONS  (STANDING):  acetaminophen     Tablet .. 650 every 6 hours PRN  dextrose 50% Injectable 25 once  dextrose 50% Injectable 12.5 once  dextrose 50% Injectable 25 once  dextrose Oral Gel 15 once PRN  diltiazem    Tablet 30 every 8 hours  enoxaparin Injectable 40 every 24 hours  glucagon  Injectable 1 once  HYDROmorphone  Injectable 0.5 every 3 hours PRN  insulin lispro (ADMELOG) corrective regimen sliding scale  three times a day before meals  metoprolol tartrate Injectable 5 every 6 hours PRN  pantoprazole    Tablet 40 before breakfast  sertraline 50 daily  tiZANidine 4 at bedtime      Vital Signs Last 24 Hrs  T(F): 96.9 (07-03-25 @ 07:01), Max: 97.4 (07-01-25 @ 15:02)    Vital Signs Last 24 Hrs  HR: 115 (07-03-25 @ 07:07) (95 - 118)  BP: 123/78 (07-03-25 @ 07:07) (111/93 - 133/74)  RR: 29 (07-03-25 @ 07:07)  SpO2: 98% (07-03-25 @ 07:07) (97% - 100%)  Wt(kg): --    EXAM:  GENERAL: In NAD, On NC  HEAD: Port Explanted. Site area C/D/I  NECK: Supple  CHEST/LUNG: Shallow breath sounds.   HEART: S1 S2  ABDOMEN: Soft, nontender  EXTREMITIES: No clubbing  NERVOUS SYSTEM: Awake.   MSK: No joint erythema, swelling or pain    Labs:                        8.0    9.22  )-----------( 90       ( 03 Jul 2025 05:48 )             26.0     07-03    143  |  106  |  29[H]  ----------------------------<  154[H]  3.2[L]   |  24  |  0.7    Ca    9.0      03 Jul 2025 05:48  Mg     2.1     07-03    TPro  5.6[L]  /  Alb  2.3[L]  /  TBili  0.6  /  DBili  x   /  AST  13  /  ALT  <5  /  AlkPhos  104  07-03      WBC Trend:  WBC Count: 9.22 (07-03-25 @ 05:48)  WBC Count: 8.44 (07-02-25 @ 11:40)  WBC Count: 7.55 (07-02-25 @ 05:42)  WBC Count: 6.91 (07-01-25 @ 11:24)      Creatine Trend:  Creatinine: 0.7 (07-03)  Creatinine: 0.7 (07-02)  Creatinine: 0.7 (07-02)  Creatinine: 0.8 (07-01)      Liver Biochemical Testing Trend:  Alanine Aminotransferase (ALT/SGPT): <5 (07-03)  Alanine Aminotransferase (ALT/SGPT): <5 (07-02)  Alanine Aminotransferase (ALT/SGPT): 6 (07-01)  Alanine Aminotransferase (ALT/SGPT): 15 (06-30)  Alanine Aminotransferase (ALT/SGPT): 37 (06-29)  Aspartate Aminotransferase (AST/SGOT): 13 (07-03-25 @ 05:48)  Aspartate Aminotransferase (AST/SGOT): 13 (07-02-25 @ 05:42)  Aspartate Aminotransferase (AST/SGOT): 20 (07-01-25 @ 05:48)  Aspartate Aminotransferase (AST/SGOT): 24 (06-30-25 @ 05:46)  Aspartate Aminotransferase (AST/SGOT): 44 (06-29-25 @ 05:49)  Bilirubin Total: 0.6 (07-03)  Bilirubin Total: 0.7 (07-02)  Bilirubin Total: 0.9 (07-01)  Bilirubin Total: 0.6 (06-30)  Bilirubin Total: 0.6 (06-29)      Trend LDH      Urinalysis Basic - ( 03 Jul 2025 05:48 )    Color: x / Appearance: x / SG: x / pH: x  Gluc: 154 mg/dL / Ketone: x  / Bili: x / Urobili: x   Blood: x / Protein: x / Nitrite: x   Leuk Esterase: x / RBC: x / WBC x   Sq Epi: x / Non Sq Epi: x / Bacteria: x        MICROBIOLOGY:    Female    Culture - Blood (collected 02 Jul 2025 05:42)  Source: Blood None  Preliminary Report:    Growth in aerobic bottle: Gram Positive Cocci in Clusters    Culture - Blood (collected 01 Jul 2025 16:20)  Source: Blood Blood-Catheter  Preliminary Report:    Growth in aerobic bottle: Gram Positive Cocci in Clusters    Growth in anaerobic bottle: Gram Positive Cocci in Clusters    Culture - Blood (collected 01 Jul 2025 05:48)  Source: Blood None  Preliminary Report:    Growth in aerobic bottle:    Gram Positive Cocci in Clusters    Growth in anaerobic bottle: Gram Positive Cocci in Clusters    Culture - Blood (collected 30 Jun 2025 05:46)  Source: Blood None  Preliminary Report:    No growth at 48 Hours    Culture - Blood (collected 29 Jun 2025 05:49)  Source: Blood None  Final Report:    Growth in aerobic and anaerobic bottles: Staphylococcus aureus    See previous culture [id number]    Culture - Blood (collected 29 Jun 2025 05:49)  Source: Blood None  Final Report:    Growth in aerobic and anaerobic bottles: Staphylococcus aureus    See previous culture  [id number]    Urinalysis with Rflx Culture (collected 27 Jun 2025 10:20)    Culture - Urine (collected 27 Jun 2025 10:20)  Source: Clean Catch None  Final Report:    >100,000 CFU/ml Escherichia coli    <10,000 CFU/ml Normal Urogenital pee present  Organism: Escherichia coli  Organism: Escherichia coli    Sensitivities:      -  Levofloxacin: S <=0.5      -  Tobramycin: S <=2      -  Nitrofurantoin: S <=32 Should not be used to treat pyelonephritis      -  Aztreonam: S <=4      -  Gentamicin: S <=2      -  Cefazolin: S 8 For uncomplicated UTI with K. pneumoniae, E. coli, or P. mirablis: AUGUSTINE <=16 is sensitive and AUGUSTINE >=32 is resistant. This also predicts results for oral agents cefaclor, cefdinir, cefpodoxime, cefprozil, cefuroxime axetil, cephalexin and locarbef for uncomplicated UTI. Note that some isolates may be susceptible to these agents while testing resistant to cefazolin.      -  Cefepime: S <=2      -  Piperacillin/Tazobactam: R 32      -  Ciprofloxacin: S <=0.25      -  Imipenem: S <=1      -  Ceftriaxone: S <=1      -  Ampicillin: R >16 These ampicillin results predict results for amoxicillin      Method Type: AUGUSTINE      -  Meropenem: S <=1      -  Ampicillin/Sulbactam: R >16/8      -  Cefoxitin: S <=8      -  Cefuroxime: S 8      -  Amoxicillin/Clavulanic Acid: R >16/8      -  Trimethoprim/Sulfamethoxazole: R >2/38      -  Tigecycline: S <=2 Interpretations based on FDA breakpoints      -  Ertapenem: S <=0.5    Culture - Blood (collected 27 Jun 2025 09:30)  Source: Blood Blood-Peripheral  Final Report:    Growth in aerobic and anaerobic bottles: Staphylococcus aureus    Direct identification is available within approximately 3-5    hours either by Blood Panel Multiplexed PCR or Direct    MALDI-TOF. Details: [email or web address]  Organism: Blood Culture PCR  Staphylococcus aureus  Organism: Blood Culture PCR    Sensitivities:      Method Type: PCR      -  Methicillin SENSITIVE Staphylococcus aureus (MSSA): Detec Any isolate of Staphylococcus aureus from a blood culture is NOT considered a contaminant.  Organism: Staphylococcus aureus    Sensitivities:      -  Clindamycin: S <=0.25      -  Oxacillin: S 0.5 Oxacillin predicts susceptibility for dicloxacillin, methicillin, and nafcillin      -  Gentamicin: S <=4 Should not be used as monotherapy      -  Vancomycin: S 1      -  Tetracycline: S <=4      Method Type: AUGUSTINE      -  Penicillin: R >2      -  Rifampin: S <=1 Should not be used as monotherapy      -  Erythromycin: S <=0.25      -  Trimethoprim/Sulfamethoxazole: S <=0.5/9.5    Culture - Blood (collected 27 Jun 2025 09:30)  Source: Blood Blood-Peripheral  Final Report:    Growth in aerobic and anaerobic bottles: Staphylococcus aureus    See previous culture [id number]                              Procalcitonin: 0.97 (06-27)        D-Dimer Assay, Quantitative: 2085 (07-01)  D-Dimer Assay, Quantitative: 863 (06-28)                RADIOLOGY & ADDITIONAL TESTS:  I have personally reviewed the relevant images.   CXR  Xray Chest 1 View AP/PA:   ACC: [id number] EXAM:  XR CHEST 1 VIEW   ORDERED BY: POONAM GERBER     PROCEDURE DATE:  07/03/2025          INTERPRETATION:  CLINICAL HISTORY: Follow-up.    COMPARISON: July 2, 2025.    TECHNIQUE: Portable frontal chest radiograph. Low lung volume.    FINDINGS:    Support devices: NGT with its tip below the diaphragm.    Cardiac/mediastinum/hilum: Stable.    Lung parenchyma/Pleura: Left basilar opacity, unchanged and new right   basilar opacity. No pneumothorax seen.    Skeleton/soft tissues: Stable.      IMPRESSION: Low lung volume.    Left basilar opacity, unchanged and new right basilar opacity.    NGT.    --- End of Report ---            ALYSSA MOSELEY MD; Attending Radiologist  This document has been electronically signed. Jul  3 2025  7:54AM (07-03-25 @ 07:52)      CT        WEIGHT  Weight (kg): 58.2 (06-27-25 @ 18:21)  Creatinine: 0.7 mg/dL (07-03-25 @ 05:48)  Creatinine: 0.7 mg/dL (07-02-25 @ 15:17)      All available historical records have been reviewed

## 2025-07-03 NOTE — PROGRESS NOTE ADULT - ASSESSMENT
This is a 80-year-old female with past medical history of hypertension, hyperlipidemia, DVT being treated for pancreatic cancer on chemotherapy with chest port brought to ed for evaluation of the weakness ans s/p fall.    MSSA bacteremia- Source likely chemoport  Multifocal Pneumonia   Thrombocytopenia   Metabolic encephalopathy   Fall with Sacral bruising  CKD 3  Transaminitis  Pancreatic CA on chemo via left sided Chest wall port  Pulmonary Nodules  Afib RVR   HO HTN  HO DVT     -Bcx noted, MSSA, still persistetnly positive 6,, 6.29, , , chemoport finally removed yesterday, daily bcx (one random negative  though repeats after positive, likely low yeild)   -chemoport removed - cx taken directly from port  positive and pus noted on port on removal   -repeat cultures in lab, repeat daily until two negatives  -ucx noted e. coli, sens noted   - nafcillin per ID  -TTE noted, cannot r/o vegetation on tricuspid. ID considering JOSE when more stable though will likely not    -platelets improving, got one unit plt    -heme/onc apprecaited, very poor prognosis   -monitor plt's, suspect multifactorial in setting of active cancer pt on chemo as well as sepsis/bacteremia, continue to trend  -cardizem switched to po, cardio apprecaited   -GOC noted, palliative appreciated,  already thinking about  arrangements, is considering comfort care though wants to see if improvement with abx and chemoport removal-this morning pt is somewhat more awake, discussed with  a t bedside, reasonable to see if improvement for now that chemoport removed and c/w abx, he understand though that overall her prognosis remains poor due to complications from already usual aggressive cancer   -dilaudid per palliative   -c/w NGT feeds, recall S+S when pt more awake and alert-better today but still seems apparent aspiraiton risk   -prognosis acutely guarded though poor overall   -start dvt ppx today now that plt better and port out

## 2025-07-03 NOTE — PROGRESS NOTE ADULT - CRITICAL CARE ATTENDING COMMENT
Attending comments:  -I have personally seen and examined this patient.  I have reviewed all pertinent clinical information and reviewed all relevant imaging and diagnostic studies personally.   -The patient's injury acutely impairs one or more vital organ systems, and there is a high probability of imminent or life threatening deterioration in the patient's condition. The care of this patient requires complex medical decision making in the field of Infectious Diseases and extensive interpretation of laboratory, microbiological and radiographic data.

## 2025-07-04 LAB
-  CLINDAMYCIN: SIGNIFICANT CHANGE UP
-  ERYTHROMYCIN: SIGNIFICANT CHANGE UP
-  GENTAMICIN: SIGNIFICANT CHANGE UP
-  OXACILLIN: SIGNIFICANT CHANGE UP
-  PENICILLIN: SIGNIFICANT CHANGE UP
-  RIFAMPIN: SIGNIFICANT CHANGE UP
-  TETRACYCLINE: SIGNIFICANT CHANGE UP
-  TRIMETHOPRIM/SULFAMETHOXAZOLE: SIGNIFICANT CHANGE UP
-  VANCOMYCIN: SIGNIFICANT CHANGE UP
ALBUMIN SERPL ELPH-MCNC: 1.9 G/DL — LOW (ref 3.5–5.2)
ALP SERPL-CCNC: 90 U/L — SIGNIFICANT CHANGE UP (ref 30–115)
ALT FLD-CCNC: <5 U/L — SIGNIFICANT CHANGE UP (ref 0–41)
ANION GAP SERPL CALC-SCNC: 14 MMOL/L — SIGNIFICANT CHANGE UP (ref 7–14)
ANION GAP SERPL CALC-SCNC: 15 MMOL/L — HIGH (ref 7–14)
AST SERPL-CCNC: 10 U/L — SIGNIFICANT CHANGE UP (ref 0–41)
BASOPHILS # BLD AUTO: 0.01 K/UL — SIGNIFICANT CHANGE UP (ref 0–0.2)
BASOPHILS # BLD AUTO: 0.01 K/UL — SIGNIFICANT CHANGE UP (ref 0–0.2)
BASOPHILS NFR BLD AUTO: 0.1 % — SIGNIFICANT CHANGE UP (ref 0–2)
BASOPHILS NFR BLD AUTO: 0.1 % — SIGNIFICANT CHANGE UP (ref 0–2)
BILIRUB SERPL-MCNC: 0.5 MG/DL — SIGNIFICANT CHANGE UP (ref 0.2–1.2)
BUN SERPL-MCNC: 25 MG/DL — HIGH (ref 10–20)
BUN SERPL-MCNC: 25 MG/DL — HIGH (ref 10–20)
CALCIUM SERPL-MCNC: 8.4 MG/DL — SIGNIFICANT CHANGE UP (ref 8.4–10.5)
CALCIUM SERPL-MCNC: 8.5 MG/DL — SIGNIFICANT CHANGE UP (ref 8.4–10.5)
CHLORIDE SERPL-SCNC: 107 MMOL/L — SIGNIFICANT CHANGE UP (ref 98–110)
CHLORIDE SERPL-SCNC: 109 MMOL/L — SIGNIFICANT CHANGE UP (ref 98–110)
CO2 SERPL-SCNC: 19 MMOL/L — SIGNIFICANT CHANGE UP (ref 17–32)
CO2 SERPL-SCNC: 21 MMOL/L — SIGNIFICANT CHANGE UP (ref 17–32)
CREAT SERPL-MCNC: 0.6 MG/DL — LOW (ref 0.7–1.5)
CREAT SERPL-MCNC: 0.6 MG/DL — LOW (ref 0.7–1.5)
CULTURE RESULTS: ABNORMAL
CULTURE RESULTS: ABNORMAL
EGFR: 95 ML/MIN/1.73M2 — SIGNIFICANT CHANGE UP
EOSINOPHIL # BLD AUTO: 0 K/UL — SIGNIFICANT CHANGE UP (ref 0–0.5)
EOSINOPHIL # BLD AUTO: 0.01 K/UL — SIGNIFICANT CHANGE UP (ref 0–0.5)
EOSINOPHIL NFR BLD AUTO: 0 % — SIGNIFICANT CHANGE UP (ref 0–6)
EOSINOPHIL NFR BLD AUTO: 0.1 % — SIGNIFICANT CHANGE UP (ref 0–6)
GLUCOSE SERPL-MCNC: 228 MG/DL — HIGH (ref 70–99)
GLUCOSE SERPL-MCNC: 359 MG/DL — HIGH (ref 70–99)
GRAM STN FLD: ABNORMAL
GRAM STN FLD: ABNORMAL
HCT VFR BLD CALC: 23.1 % — LOW (ref 34.5–45)
HCT VFR BLD CALC: 24.3 % — LOW (ref 34.5–45)
HGB BLD-MCNC: 6.8 G/DL — CRITICAL LOW (ref 11.5–15.5)
HGB BLD-MCNC: 7.1 G/DL — LOW (ref 11.5–15.5)
IMM GRANULOCYTES # BLD AUTO: 0.05 K/UL — SIGNIFICANT CHANGE UP (ref 0–0.07)
IMM GRANULOCYTES # BLD AUTO: 0.05 K/UL — SIGNIFICANT CHANGE UP (ref 0–0.07)
IMM GRANULOCYTES NFR BLD AUTO: 0.7 % — SIGNIFICANT CHANGE UP (ref 0–0.9)
IMM GRANULOCYTES NFR BLD AUTO: 0.7 % — SIGNIFICANT CHANGE UP (ref 0–0.9)
IMMATURE PLATELET FRACTION #: 3.9 K/UL — LOW (ref 4.7–11.1)
IMMATURE PLATELET FRACTION #: 4.1 K/UL — LOW (ref 4.7–11.1)
IMMATURE PLATELET FRACTION %: 4.4 % — SIGNIFICANT CHANGE UP (ref 1.6–4.9)
IMMATURE PLATELET FRACTION %: 4.5 % — SIGNIFICANT CHANGE UP (ref 1.6–4.9)
LYMPHOCYTES # BLD AUTO: 0.47 K/UL — LOW (ref 1–3.3)
LYMPHOCYTES # BLD AUTO: 0.55 K/UL — LOW (ref 1–3.3)
LYMPHOCYTES NFR BLD AUTO: 6.8 % — LOW (ref 13–44)
LYMPHOCYTES NFR BLD AUTO: 7.3 % — LOW (ref 13–44)
MAGNESIUM SERPL-MCNC: 2 MG/DL — SIGNIFICANT CHANGE UP (ref 1.8–2.4)
MCHC RBC-ENTMCNC: 28.7 PG — SIGNIFICANT CHANGE UP (ref 27–34)
MCHC RBC-ENTMCNC: 29.2 G/DL — LOW (ref 32–36)
MCHC RBC-ENTMCNC: 29.2 PG — SIGNIFICANT CHANGE UP (ref 27–34)
MCHC RBC-ENTMCNC: 29.4 G/DL — LOW (ref 32–36)
MCV RBC AUTO: 98.4 FL — SIGNIFICANT CHANGE UP (ref 80–100)
MCV RBC AUTO: 99.1 FL — SIGNIFICANT CHANGE UP (ref 80–100)
METHOD TYPE: SIGNIFICANT CHANGE UP
MONOCYTES # BLD AUTO: 0.36 K/UL — SIGNIFICANT CHANGE UP (ref 0–0.9)
MONOCYTES # BLD AUTO: 0.41 K/UL — SIGNIFICANT CHANGE UP (ref 0–0.9)
MONOCYTES NFR BLD AUTO: 5.2 % — SIGNIFICANT CHANGE UP (ref 2–14)
MONOCYTES NFR BLD AUTO: 5.5 % — SIGNIFICANT CHANGE UP (ref 2–14)
NEUTROPHILS # BLD AUTO: 6.04 K/UL — SIGNIFICANT CHANGE UP (ref 1.8–7.4)
NEUTROPHILS # BLD AUTO: 6.47 K/UL — SIGNIFICANT CHANGE UP (ref 1.8–7.4)
NEUTROPHILS NFR BLD AUTO: 86.3 % — HIGH (ref 43–77)
NEUTROPHILS NFR BLD AUTO: 87.2 % — HIGH (ref 43–77)
NRBC # BLD AUTO: 0 K/UL — SIGNIFICANT CHANGE UP (ref 0–0)
NRBC # BLD AUTO: 0 K/UL — SIGNIFICANT CHANGE UP (ref 0–0)
NRBC # FLD: 0 K/UL — SIGNIFICANT CHANGE UP (ref 0–0)
NRBC # FLD: 0 K/UL — SIGNIFICANT CHANGE UP (ref 0–0)
NRBC BLD AUTO-RTO: 0 /100 WBCS — SIGNIFICANT CHANGE UP (ref 0–0)
NRBC BLD AUTO-RTO: 0 /100 WBCS — SIGNIFICANT CHANGE UP (ref 0–0)
ORGANISM # SPEC MICROSCOPIC CNT: ABNORMAL
ORGANISM # SPEC MICROSCOPIC CNT: SIGNIFICANT CHANGE UP
PLATELET # BLD AUTO: 88 K/UL — LOW (ref 150–400)
PLATELET # BLD AUTO: 91 K/UL — LOW (ref 150–400)
PMV BLD: 12.1 FL — SIGNIFICANT CHANGE UP (ref 7–13)
PMV BLD: 12.2 FL — SIGNIFICANT CHANGE UP (ref 7–13)
POTASSIUM SERPL-MCNC: 2.5 MMOL/L — CRITICAL LOW (ref 3.5–5)
POTASSIUM SERPL-MCNC: 3.5 MMOL/L — SIGNIFICANT CHANGE UP (ref 3.5–5)
POTASSIUM SERPL-SCNC: 2.5 MMOL/L — CRITICAL LOW (ref 3.5–5)
POTASSIUM SERPL-SCNC: 3.5 MMOL/L — SIGNIFICANT CHANGE UP (ref 3.5–5)
PROT SERPL-MCNC: 5.2 G/DL — LOW (ref 6–8)
RBC # BLD: 2.33 M/UL — LOW (ref 3.8–5.2)
RBC # BLD: 2.47 M/UL — LOW (ref 3.8–5.2)
RBC # FLD: 16 % — HIGH (ref 10.3–14.5)
RBC # FLD: 16 % — HIGH (ref 10.3–14.5)
SODIUM SERPL-SCNC: 141 MMOL/L — SIGNIFICANT CHANGE UP (ref 135–146)
SODIUM SERPL-SCNC: 144 MMOL/L — SIGNIFICANT CHANGE UP (ref 135–146)
SPECIMEN SOURCE: SIGNIFICANT CHANGE UP
WBC # BLD: 6.93 K/UL — SIGNIFICANT CHANGE UP (ref 3.8–10.5)
WBC # BLD: 7.5 K/UL — SIGNIFICANT CHANGE UP (ref 3.8–10.5)
WBC # FLD AUTO: 6.93 K/UL — SIGNIFICANT CHANGE UP (ref 3.8–10.5)
WBC # FLD AUTO: 7.5 K/UL — SIGNIFICANT CHANGE UP (ref 3.8–10.5)

## 2025-07-04 PROCEDURE — 71045 X-RAY EXAM CHEST 1 VIEW: CPT | Mod: 26

## 2025-07-04 PROCEDURE — 99232 SBSQ HOSP IP/OBS MODERATE 35: CPT

## 2025-07-04 PROCEDURE — 99233 SBSQ HOSP IP/OBS HIGH 50: CPT

## 2025-07-04 RX ADMIN — INSULIN LISPRO 1: 100 INJECTION, SOLUTION INTRAVENOUS; SUBCUTANEOUS at 13:12

## 2025-07-04 RX ADMIN — DILTIAZEM HYDROCHLORIDE 30 MILLIGRAM(S): 240 TABLET, EXTENDED RELEASE ORAL at 22:14

## 2025-07-04 RX ADMIN — Medication 1 APPLICATION(S): at 06:12

## 2025-07-04 RX ADMIN — NAFCILLIN 200 GRAM(S): 2 INJECTION, SOLUTION INTRAVENOUS at 09:14

## 2025-07-04 RX ADMIN — SERTRALINE 50 MILLIGRAM(S): 100 TABLET, FILM COATED ORAL at 11:05

## 2025-07-04 RX ADMIN — Medication 50 MILLIEQUIVALENT(S): at 11:05

## 2025-07-04 RX ADMIN — Medication 50 MILLIEQUIVALENT(S): at 09:10

## 2025-07-04 RX ADMIN — Medication 50 MILLIEQUIVALENT(S): at 13:07

## 2025-07-04 RX ADMIN — MEDPURA VITAMIN A AND D 1 APPLICATION(S): 95 OINTMENT TOPICAL at 17:31

## 2025-07-04 RX ADMIN — NAFCILLIN 200 GRAM(S): 2 INJECTION, SOLUTION INTRAVENOUS at 13:07

## 2025-07-04 RX ADMIN — Medication 40 MILLIEQUIVALENT(S): at 09:10

## 2025-07-04 RX ADMIN — INSULIN LISPRO 2: 100 INJECTION, SOLUTION INTRAVENOUS; SUBCUTANEOUS at 06:13

## 2025-07-04 RX ADMIN — Medication 1 APPLICATION(S): at 06:16

## 2025-07-04 RX ADMIN — NAFCILLIN 200 GRAM(S): 2 INJECTION, SOLUTION INTRAVENOUS at 22:15

## 2025-07-04 RX ADMIN — Medication 1 APPLICATION(S): at 17:31

## 2025-07-04 RX ADMIN — NAFCILLIN 200 GRAM(S): 2 INJECTION, SOLUTION INTRAVENOUS at 06:12

## 2025-07-04 RX ADMIN — MEDPURA VITAMIN A AND D 1 APPLICATION(S): 95 OINTMENT TOPICAL at 06:09

## 2025-07-04 RX ADMIN — DILTIAZEM HYDROCHLORIDE 30 MILLIGRAM(S): 240 TABLET, EXTENDED RELEASE ORAL at 06:09

## 2025-07-04 RX ADMIN — TIZANIDINE 4 MILLIGRAM(S): 4 TABLET ORAL at 22:14

## 2025-07-04 RX ADMIN — INSULIN LISPRO 3: 100 INJECTION, SOLUTION INTRAVENOUS; SUBCUTANEOUS at 17:02

## 2025-07-04 RX ADMIN — NAFCILLIN 200 GRAM(S): 2 INJECTION, SOLUTION INTRAVENOUS at 01:31

## 2025-07-04 RX ADMIN — ENOXAPARIN SODIUM 40 MILLIGRAM(S): 100 INJECTION SUBCUTANEOUS at 11:05

## 2025-07-04 RX ADMIN — DILTIAZEM HYDROCHLORIDE 30 MILLIGRAM(S): 240 TABLET, EXTENDED RELEASE ORAL at 13:07

## 2025-07-04 RX ADMIN — NAFCILLIN 200 GRAM(S): 2 INJECTION, SOLUTION INTRAVENOUS at 17:31

## 2025-07-04 NOTE — PROGRESS NOTE ADULT - SUBJECTIVE AND OBJECTIVE BOX
LINDA ONEAL 74y Female  MRN#: [id number]     Hospital Day: 7d      SUBJECTIVE  No acute events overnight, pt seen and examined this morning.                                          ----------------------------------------------------------  OBJECTIVE  PAST MEDICAL & SURGICAL HISTORY                                            -----------------------------------------------------------  ALLERGIES:  No Known Allergies                                            ------------------------------------------------------------    HOME MEDICATIONS  Home Medications:  Claritin 10 mg oral tablet: 1 tab(s) orally once a day (27 Jun 2025 16:31)  Creon 24,000 units oral delayed release capsule: 1 cap(s) orally 3 times a day (27 Jun 2025 16:31)  docusate: 3 tab(s) orally once a day (27 Jun 2025 16:31)  Eliquis 5 mg oral tablet: 1 tab(s) orally 2 times a day (27 Jun 2025 16:31)  glimepiride 2 mg oral tablet: 1 tab(s) orally once a day (27 Jun 2025 16:31)  HYDROmorphone 4 mg oral tablet: 1 tab(s) orally every 6 hours as needed for  severe pain (27 Jun 2025 16:31)  lansoprazole 30 mg oral delayed release capsule: 1 cap(s) orally once a day (27 Jun 2025 16:31)  morphine 30 mg oral tablet: 1 tab(s) orally 3 times a day as needed for  moderate pain (27 Jun 2025 16:31)  sertraline 50 mg oral tablet: 1 tab(s) orally once a day (27 Jun 2025 16:31)  tiZANidine 4 mg oral capsule: 2 cap(s) orally once a day (at bedtime) (27 Jun 2025 16:31)                           MEDICATIONS:  STANDING MEDICATIONS  bacitracin   Ointment 1 Application(s) Topical two times a day  chlorhexidine 2% Cloths 1 Application(s) Topical <User Schedule>  dextrose 5%. 1000 milliLiter(s) IV Continuous <Continuous>  dextrose 5%. 1000 milliLiter(s) IV Continuous <Continuous>  dextrose 50% Injectable 25 Gram(s) IV Push once  dextrose 50% Injectable 12.5 Gram(s) IV Push once  dextrose 50% Injectable 25 Gram(s) IV Push once  diltiazem    Tablet 30 milliGRAM(s) Oral every 8 hours  enoxaparin Injectable 40 milliGRAM(s) SubCutaneous every 24 hours  glucagon  Injectable 1 milliGRAM(s) IntraMuscular once  insulin lispro (ADMELOG) corrective regimen sliding scale   SubCutaneous three times a day before meals  nafcillin  IVPB 2 Gram(s) IV Intermittent every 4 hours  pantoprazole    Tablet 40 milliGRAM(s) Oral before breakfast  potassium chloride  20 mEq/100 mL IVPB 20 milliEquivalent(s) IV Intermittent every 2 hours  sertraline 50 milliGRAM(s) Oral daily  tiZANidine 4 milliGRAM(s) Oral at bedtime  vitamin A & D Ointment 1 Application(s) Topical two times a day    PRN MEDICATIONS  acetaminophen     Tablet .. 650 milliGRAM(s) Oral every 6 hours PRN  dextrose Oral Gel 15 Gram(s) Oral once PRN  HYDROmorphone  Injectable 0.5 milliGRAM(s) IV Push every 3 hours PRN  metoprolol tartrate Injectable 5 milliGRAM(s) IV Push every 6 hours PRN                                            ------------------------------------------------------------  VITAL SIGNS: Last 24 Hours  T(C): 36.1 (04 Jul 2025 07:01), Max: 36.7 (03 Jul 2025 15:15)  T(F): 97 (04 Jul 2025 07:01), Max: 98.1 (03 Jul 2025 23:01)  HR: 89 (04 Jul 2025 07:10) (89 - 110)  BP: 102/55 (04 Jul 2025 07:10) (102/55 - 150/69)  BP(mean): 70 (04 Jul 2025 07:10) (70 - 99)  RR: 22 (04 Jul 2025 07:10) (20 - 28)  SpO2: 94% (04 Jul 2025 07:10) (94% - 100%)      07-03-25 @ 07:01  -  07-04-25 @ 07:00  --------------------------------------------------------  IN: 1580 mL / OUT: 1351 mL / NET: 229 mL    07-04-25 @ 07:01  -  07-04-25 @ 10:41  --------------------------------------------------------  IN: 400 mL / OUT: 0 mL / NET: 400 mL                                             --------------------------------------------------------------  LABS:                        7.1    7.50  )-----------( 88       ( 04 Jul 2025 05:52 )             24.3     07-04    144  |  109  |  25[H]  ----------------------------<  228[H]  2.5[LL]   |  21  |  0.6[L]    Ca    8.4      04 Jul 2025 05:52  Mg     2.0     07-04    TPro  5.2[L]  /  Alb  1.9[L]  /  TBili  0.5  /  DBili  x   /  AST  10  /  ALT  <5  /  AlkPhos  90  07-04      Urinalysis Basic - ( 04 Jul 2025 05:52 )    Color: x / Appearance: x / SG: x / pH: x  Gluc: 228 mg/dL / Ketone: x  / Bili: x / Urobili: x   Blood: x / Protein: x / Nitrite: x   Leuk Esterase: x / RBC: x / WBC x   Sq Epi: x / Non Sq Epi: x / Bacteria: x              Culture - Blood (collected 03 Jul 2025 05:48)  Source: Blood None  Gram Stain (04 Jul 2025 04:45):    Growth in aerobic bottle: Gram Positive Cocci in Clusters    Growth in anaerobic bottle: Gram Positive Cocci in Clusters  Preliminary Report (04 Jul 2025 04:45):    Growth in aerobic bottle: Gram Positive Cocci in Clusters    Growth in anaerobic bottle: Gram Positive Cocci in Clusters    Culture - Blood (collected 02 Jul 2025 05:42)  Source: Blood None  Gram Stain (03 Jul 2025 11:31):    Growth in aerobic bottle: Gram Positive Cocci in Clusters    Growth in anaerobic bottle: Gram Positive Cocci in Clusters  Preliminary Report (03 Jul 2025 11:31):    Growth in aerobic bottle: Gram Positive Cocci in Clusters    Growth in anaerobic bottle: Gram Positive Cocci in Clusters    Culture - Blood (collected 01 Jul 2025 16:20)  Source: Blood Blood-Catheter  Gram Stain (03 Jul 2025 02:17):    Growth in aerobic bottle: Gram Positive Cocci in Clusters    Growth in anaerobic bottle: Gram Positive Cocci in Clusters  Final Report (04 Jul 2025 00:27):    Growth in aerobic and anaerobic bottles: Staphylococcus haemolyticus    "Susceptibilities not performed"    Growth in aerobic bottle: Staphylococcus aureus    See previous culture [id number]                                                    -------------------------------------------------------------  RADIOLOGY:  CXR  Xray Chest 1 View AP/PA:   ACC: [id number] EXAM:  XR CHEST 1 VIEW   ORDERED BY: POONAM GERBER     PROCEDURE DATE:  07/04/2025          INTERPRETATION:  CLINICAL HISTORY / REASON FOR EXAM: Shortness of breath.    COMPARISON: Chest radiograph from July 3, 2025.    TECHNIQUE/POSITIONING: Satisfactory. Single image, AP chest radiograph.    FINDINGS:    SUPPORT DEVICES: Enteric tube courses below the left hemidiaphragm, with   distal tip out of the field of view.    CARDIAC/MEDIASTINUM/HILUM: Unchanged cardiac silhouette.    LUNG PARENCHYMA/PLEURA: Unchanged left basilar opacity/effusion. Stable   right basilar hazy opacity.    SKELETON/SOFT TISSUES: Unchanged.      IMPRESSION:    Unchanged left basilar opacity/effusion.    --- End of Report ---            STARLA CALLE MD; Attending Radiologist  This document has been electronically signed. Jul 4 2025  8:54AM (07-04-25 @ 07:41)  Xray Chest 1 View AP/PA:   ACC: [id number] EXAM:  XR CHEST 1 VIEW   ORDERED BY: POONAM BELLWESI     PROCEDURE DATE:  07/03/2025          INTERPRETATION:  CLINICAL HISTORY: Follow-up.    COMPARISON: July 2, 2025.    TECHNIQUE: Portable frontal chest radiograph. Low lung volume.    FINDINGS:    Support devices: NGT with its tip below the diaphragm.    Cardiac/mediastinum/hilum: Stable.    Lung parenchyma/Pleura: Left basilar opacity, unchanged and new right   basilar opacity. No pneumothorax seen.    Skeleton/soft tissues: Stable.      IMPRESSION: Low lung volume.    Left basilar opacity, unchanged and new right basilar opacity.    NGT.    --- End of Report ---            ALYSSA MOSELEY MD; Attending Radiologist  This document has been electronically signed. Jul  3 2025  7:54AM (07-03-25 @ 07:52)  Xray Chest 1 View AP/PA:   ACC: [id number] EXAM:  XR CHEST 1 VIEW   ORDERED BY: POONAMCECILIO GERBER     PROCEDURE DATE:  07/02/2025          INTERPRETATION:  Clinical History: sob.    Comparison : Chest radiograph 7/1/2025.    Technique/Positioning: Frontal chest radiograph.    Findings:    Study limited by patient rotation    Support devices: Feeding tube coursing below field of view. Left chest   port redemonstrated.    Cardiac/mediastinum/hilum: Unchanged.    Lung parenchyma/Pleura: Increasing left-sided opacities/effusion.    Skeleton/soft tissues: Unchanged.    Impression:    Increasing left-sided opacities/effusion.    --- End of Report ---            ADEEL LUTHER MD; Attending Radiologist  This document has been electronically signed. Jul 2 2025 12:25PM (07-02-25 @ 10:35)      CT                                            --------------------------------------------------------------    PHYSICAL EXAM:  GENERAL: lying in bed, ill-appearing   CHEST/LUNG: decreased breath sounds  HEART: slight tachy   ABDOMEN: Soft, nontender, nondistended  EXTREMITIES:  mild le edema   NERVOUS SYSTEM:  A&Ox1

## 2025-07-04 NOTE — PROGRESS NOTE ADULT - ASSESSMENT
This is a 80-year-old female with past medical history of hypertension, hyperlipidemia, DVT being treated for pancreatic cancer on chemotherapy with chest port brought to ed for evaluation of the weakness ans s/p fall.    MSSA bacteremia- Source likely chemoport  Multifocal Pneumonia   Thrombocytopenia   Metabolic encephalopathy   Fall with Sacral bruising  CKD 3  Transaminitis  Pancreatic CA on chemo via left sided Chest wall port  Pulmonary Nodules  Afib RVR   HO HTN  HO DVT     -Bcx noted, MSSA, still persistetnly positive 6,27, 6.29, , , 7/3 chemoport finally removed , daily bcx until 2 negatives (one random negative  though subsequent repeat positive-lacks utility)   -chemoport removed - cx taken directly from port  positive and pus noted on port on removal   -repeat cultures in lab, repeat daily until two negatives  -ucx noted e. coli, sens noted   - nafcillin per ID, will need 6 week course likely   -TTE noted, cannot r/o vegetation on tricuspid. ID considering JOSE when more stable though will likely not ?   -platelets improving, got one unit plt    -heme/onc apprecaited, very poor prognosis   -monitor plt's, suspect multifactorial in setting of active cancer pt on chemo as well as sepsis/bacteremia, continue to trend  -cardizem switched to po, cardio apprecaited   -GOC noted, palliative appreciated,  already thinking about  arrangements, is considering comfort care though wants to see if improvement with abx and chemoport removal  - pt is somewhat more awake last two days since chemoport removed, discussed with  at bedside yesterday, reasonable to see if improvement for now that chemoport removed and c/w abx, though he understands that overall her prognosis remains poor due to complications from already a usual aggressive cancer   -dilaudid per palliative   -c/w NGT feeds, cleared today by S+S for pureed now tht pt more awake, though c/w NGT feeds to meet nutrion goals   -prognosis acutely guarded though very poor overall- understands, wants to treat infx   -replete K po and IV toda, repeat bmp pm  -recheck cbc in pm, hgb noted   -dvt ppx

## 2025-07-04 NOTE — CHART NOTE - NSCHARTNOTEFT_GEN_A_CORE
Registered Dietitian Follow-Up     Patient Profile Reviewed                           Yes [X]   No []     Nutrition History Previously Obtained        Yes [X]  No []       Pertinent  Information:   pt is 80 year old female with hx of HTN, DVT, dementia, DM (as per spouse)  pancreatic CA on chemo p/w weakness with decreased po for several days. pt admitted with AMS, sepsis POA, + MSSA, new onset afib. pt is DNR/DNI     failed SLP eval, NGT placed for EN, pt received feeds from  to this afternoon of glucerna 1.2, 240 ml q 8 hrs and tolerated well providing pt with 864 kcals, 43g protein.      s/p removal of chest port    pt downgraded to 3S, SLP eval with recommendations for puree diet with EN via NGT       Diet order:     Diet, Pureed:   Consistent Carbohydrate {No Snacks} (CSTCHO)  Tube Feeding Modality: Nasogastric  Glucerna 1.2 Appa (GLUCERNARTH)  Total Volume for 24 Hours (mL): 1080  Bolus  Total Volume of Bolus (mL):  360  Tube Feed Frequency: Every 8 hours   Tube Feed Start Time: 14:00  Bolus Feed Rate (mL per Hour): 360   Bolus Feed Duration (in Hours): 1  Free Water Flush  Free Water Flush Instructions:  50 ml pre and post feed (25 @ 18:50) [Pending Verification By Attending]  Diet, Pureed (25 @ 10:40) [Active]      HT: 172.7 cm  Daily Weight in k.7 (), Weight in k.7 (), Weight in k.4 (), Weight in k.8 (), Weight in k.9 ()  % Weight Change    MEDICATIONS  (STANDING):  bacitracin   Ointment 1 Application(s) Topical two times a day  diltiazem    Tablet 30 milliGRAM(s) Oral every 8 hours  enoxaparin Injectable 40 milliGRAM(s) SubCutaneous every 24 hours  glucagon  Injectable 1 milliGRAM(s) IntraMuscular once  insulin lispro (ADMELOG) corrective regimen sliding scale   SubCutaneous three times a day before meals  nafcillin  IVPB 2 Gram(s) IV Intermittent every 4 hours  pantoprazole    Tablet 40 milliGRAM(s) Oral before breakfast  sertraline 50 milliGRAM(s) Oral daily  tiZANidine 4 milliGRAM(s) Oral at bedtime  vitamin A & D Ointment 1 Application(s) Topical two times a day    MEDICATIONS  (PRN):  acetaminophen     Tablet .. 650 milliGRAM(s) Oral every 6 hours PRN Temp greater or equal to 38C (100.4F), Mild Pain (1 - 3)  dextrose Oral Gel 15 Gram(s) Oral once PRN Blood Glucose LESS THAN 70 milliGRAM(s)/deciliter  HYDROmorphone  Injectable 0.5 milliGRAM(s) IV Push every 3 hours PRN Severe Pain (7 - 10)  metoprolol tartrate Injectable 5 milliGRAM(s) IV Push every 6 hours PRN heart rate above 110    Pertinent Labs:  @ 16:05: Na 141, BUN 25[H], Cr 0.6[L], [H], K+ 3.5, Phos --, Mg --, Alk Phos --, ALT/SGPT --, AST/SGOT --, HbA1c --   @ 05:52: Na 144, BUN 25[H], Cr 0.6[L], [H], K+ 2.5[LL], Phos --, Mg 2.0, Alk Phos 90, ALT/SGPT <5, AST/SGOT 10, HbA1c --    Finger Sticks:  POCT Blood Glucose.: 323 mg/dL ( @ 21:07)  POCT Blood Glucose.: 292 mg/dL ( @ 16:28)  POCT Blood Glucose.: 189 mg/dL ( @ 13:06)  POCT Blood Glucose.: 221 mg/dL ( @ 05:50)    Physical Findings:  - Appearance: lethargic   - GI function: +BS  - Tubes: NGT  - Oral/Mouth cavity:  - Skin: sacrum DTI, L heel suspected DTI  - Edema: L wrist +1, B/L feet + 2    Nutrition Requirements:  Weight Used: 58. 2 kgs      Estimated Energy Needs      9035-0042 kcals ( 25-30 kcal/kg/BW)  76-87g protein ( 1.3-1.5g/kg/BW)  1;1 kcal for estimated fluid needs     Nutrient Intake: pt presently on a puree diet only with <25% of tray consumed    [] Previous Nutrition Diagnosis:            X[] Ongoing          [] Resolved    severe PCM remains        Goal/Expected Outcome: pt to tolerate EN and meet at least 80% of estimated needs within 4 days      Indicator/Monitoring: tolerance to po diet and EN, labs/meds, bowel fxn, NFPF    Recommendation:   -puree diet with EN of glucerna 1.2  360 ml q 8 hrs with H20 flushes of 50 ml AC/PC will provide pt with 1296 kcals, 64g protein and 1164 ml H20 meeting >80% of estimated needs. If pt consumes >50-75% of tray hold next feed.  HIGH risk

## 2025-07-04 NOTE — PROGRESS NOTE ADULT - SUBJECTIVE AND OBJECTIVE BOX
Patient is a 74y old  Female who presents with a chief complaint of weakness (03 Jul 2025 11:09)        Over Night Events:    No events         ROS:  See HPI    PHYSICAL EXAM    ICU Vital Signs Last 24 Hrs  T(C): 36.7 (03 Jul 2025 23:01), Max: 36.7 (03 Jul 2025 15:15)  T(F): 98.1 (03 Jul 2025 23:01), Max: 98.1 (03 Jul 2025 23:01)  HR: 104 (04 Jul 2025 06:00) (94 - 115)  BP: 129/79 (04 Jul 2025 06:00) (116/72 - 150/69)  BP(mean): 94 (04 Jul 2025 06:00) (88 - 99)  ABP: --  ABP(mean): --  RR: 24 (04 Jul 2025 06:00) (24 - 29)  SpO2: 98% (04 Jul 2025 06:00) (98% - 100%)    O2 Parameters below as of 04 Jul 2025 04:00  Patient On (Oxygen Delivery Method): nasal cannula            General: NGT   HEENT: ALICE             Lymphatic system: No cervical LN   Lungs: Bilateral BS; coarse   Cardiovascular: Regular   Gastrointestinal: Soft, Positive BS  Extremities: No clubbing.  weakness   Skin: Warm, intact  Neurological: baseline       07-02-25 @ 07:01  -  07-03-25 @ 07:00  --------------------------------------------------------  IN:    Enteral Tube Flush: 300 mL    Free Water: 800 mL    Glucerna: 720 mL    IV PiggyBack: 900 mL  Total IN: 2720 mL    OUT:    Indwelling Catheter - Urethral (mL): 770 mL    Intermittent Catheterization - Urethral (mL): 400 mL    Voided (mL): 0 mL  Total OUT: 1170 mL    Total NET: 1550 mL      07-03-25 @ 07:01  -  07-04-25 @ 06:52  --------------------------------------------------------  IN:    Enteral Tube Flush: 200 mL    Free Water: 600 mL    Glucerna: 480 mL    IV PiggyBack: 300 mL  Total IN: 1580 mL    OUT:    Indwelling Catheter - Urethral (mL): 1350 mL    Intermittent Catheterization - Urethral (mL): 1 mL  Total OUT: 1351 mL    Total NET: 229 mL          LABS:                            8.0    9.22  )-----------( 90       ( 03 Jul 2025 05:48 )             26.0                                               07-03    143  |  106  |  29[H]  ----------------------------<  154[H]  3.2[L]   |  24  |  0.7    Ca    9.0      03 Jul 2025 05:48  Mg     2.1     07-03    TPro  5.6[L]  /  Alb  2.3[L]  /  TBili  0.6  /  DBili  x   /  AST  13  /  ALT  <5  /  AlkPhos  104  07-03                                             Urinalysis Basic - ( 03 Jul 2025 05:48 )    Color: x / Appearance: x / SG: x / pH: x  Gluc: 154 mg/dL / Ketone: x  / Bili: x / Urobili: x   Blood: x / Protein: x / Nitrite: x   Leuk Esterase: x / RBC: x / WBC x   Sq Epi: x / Non Sq Epi: x / Bacteria: x                                                  LIVER FUNCTIONS - ( 03 Jul 2025 05:48 )  Alb: 2.3 g/dL / Pro: 5.6 g/dL / ALK PHOS: 104 U/L / ALT: <5 U/L / AST: 13 U/L / GGT: x                                                  Culture - Blood (collected 03 Jul 2025 05:48)  Source: Blood None  Gram Stain (04 Jul 2025 04:45):    Growth in aerobic bottle: Gram Positive Cocci in Clusters    Growth in anaerobic bottle: Gram Positive Cocci in Clusters  Preliminary Report (04 Jul 2025 04:45):    Growth in aerobic bottle: Gram Positive Cocci in Clusters    Growth in anaerobic bottle: Gram Positive Cocci in Clusters    Culture - Blood (collected 02 Jul 2025 05:42)  Source: Blood None  Gram Stain (03 Jul 2025 11:31):    Growth in aerobic bottle: Gram Positive Cocci in Clusters    Growth in anaerobic bottle: Gram Positive Cocci in Clusters  Preliminary Report (03 Jul 2025 11:31):    Growth in aerobic bottle: Gram Positive Cocci in Clusters    Growth in anaerobic bottle: Gram Positive Cocci in Clusters    Culture - Blood (collected 01 Jul 2025 16:20)  Source: Blood Blood-Catheter  Gram Stain (03 Jul 2025 02:17):    Growth in aerobic bottle: Gram Positive Cocci in Clusters    Growth in anaerobic bottle: Gram Positive Cocci in Clusters  Final Report (04 Jul 2025 00:27):    Growth in aerobic and anaerobic bottles: Staphylococcus haemolyticus    "Susceptibilities not performed"    Growth in aerobic bottle: Staphylococcus aureus    See previous culture [id number]                                                                                           MEDICATIONS  (STANDING):  bacitracin   Ointment 1 Application(s) Topical two times a day  chlorhexidine 2% Cloths 1 Application(s) Topical <User Schedule>  dextrose 5%. 1000 milliLiter(s) (100 mL/Hr) IV Continuous <Continuous>  dextrose 5%. 1000 milliLiter(s) (50 mL/Hr) IV Continuous <Continuous>  dextrose 50% Injectable 25 Gram(s) IV Push once  dextrose 50% Injectable 12.5 Gram(s) IV Push once  dextrose 50% Injectable 25 Gram(s) IV Push once  diltiazem    Tablet 30 milliGRAM(s) Oral every 8 hours  enoxaparin Injectable 40 milliGRAM(s) SubCutaneous every 24 hours  glucagon  Injectable 1 milliGRAM(s) IntraMuscular once  insulin lispro (ADMELOG) corrective regimen sliding scale   SubCutaneous three times a day before meals  nafcillin  IVPB 2 Gram(s) IV Intermittent every 4 hours  pantoprazole    Tablet 40 milliGRAM(s) Oral before breakfast  sertraline 50 milliGRAM(s) Oral daily  tiZANidine 4 milliGRAM(s) Oral at bedtime  vitamin A & D Ointment 1 Application(s) Topical two times a day    MEDICATIONS  (PRN):  acetaminophen     Tablet .. 650 milliGRAM(s) Oral every 6 hours PRN Temp greater or equal to 38C (100.4F), Mild Pain (1 - 3)  dextrose Oral Gel 15 Gram(s) Oral once PRN Blood Glucose LESS THAN 70 milliGRAM(s)/deciliter  HYDROmorphone  Injectable 0.5 milliGRAM(s) IV Push every 3 hours PRN Severe Pain (7 - 10)  metoprolol tartrate Injectable 5 milliGRAM(s) IV Push every 6 hours PRN heart rate above 110      Xrays:                                                                                     ECHO

## 2025-07-04 NOTE — PROGRESS NOTE ADULT - ASSESSMENT
IMPRESSION:    MSSA bacteremia- Unclear Source, port removed   Multifocal Pneumonia   Thrombocytopenia   Anemia   Metabolic encephalopathy   Fall with Sacral bruising  CKD 3  Transaminitis  Pancreatic CA on chemo via left sided Chest wall port  Pulmonary Nodules  HO HTN    PLAN:    CNS: avoid sedation, CTH and C spine negative    HEENT: Oral care    PULMONARY:  HOB @ 45 degrees. On NC. CT with pachy mutifocal pneumonia and nodules.     CARDIOVASCULAR: Echo with normal EF; Possible thickening of tricuspid valve. Cannot rule out vegetation. Keep equal.     GI: GI prophylaxis. NG tube in place. Speech and swallow follow up.     RENAL: Follow up lytes. Correct as needed.     INFECTIOUS DISEASE: S/P port removal. Daily blood cultures. ID follow up.     HEMATOLOGICAL: DVT ppx as long as platelets more than 50k.     ENDOCRINE:  Follow up FS. Insulin protocol if needed.    MUSCULOSKELETAL: PT OT.     Tele   Palliative care following  Poor prognosis   Recall as needed

## 2025-07-05 LAB
ALBUMIN SERPL ELPH-MCNC: 1.9 G/DL — LOW (ref 3.5–5.2)
ALP SERPL-CCNC: 90 U/L — SIGNIFICANT CHANGE UP (ref 30–115)
ALT FLD-CCNC: <5 U/L — SIGNIFICANT CHANGE UP (ref 0–41)
ANION GAP SERPL CALC-SCNC: 14 MMOL/L — SIGNIFICANT CHANGE UP (ref 7–14)
AST SERPL-CCNC: 10 U/L — SIGNIFICANT CHANGE UP (ref 0–41)
BASOPHILS # BLD AUTO: 0.01 K/UL — SIGNIFICANT CHANGE UP (ref 0–0.2)
BASOPHILS NFR BLD AUTO: 0.2 % — SIGNIFICANT CHANGE UP (ref 0–2)
BILIRUB SERPL-MCNC: 0.5 MG/DL — SIGNIFICANT CHANGE UP (ref 0.2–1.2)
BUN SERPL-MCNC: 21 MG/DL — HIGH (ref 10–20)
CALCIUM SERPL-MCNC: 8.4 MG/DL — SIGNIFICANT CHANGE UP (ref 8.4–10.5)
CHLORIDE SERPL-SCNC: 111 MMOL/L — HIGH (ref 98–110)
CO2 SERPL-SCNC: 23 MMOL/L — SIGNIFICANT CHANGE UP (ref 17–32)
CREAT SERPL-MCNC: 0.6 MG/DL — LOW (ref 0.7–1.5)
CULTURE RESULTS: ABNORMAL
CULTURE RESULTS: ABNORMAL
CULTURE RESULTS: SIGNIFICANT CHANGE UP
EGFR: 95 ML/MIN/1.73M2 — SIGNIFICANT CHANGE UP
EGFR: 95 ML/MIN/1.73M2 — SIGNIFICANT CHANGE UP
EOSINOPHIL # BLD AUTO: 0 K/UL — SIGNIFICANT CHANGE UP (ref 0–0.5)
EOSINOPHIL NFR BLD AUTO: 0 % — SIGNIFICANT CHANGE UP (ref 0–6)
GLUCOSE BLDC GLUCOMTR-MCNC: 186 MG/DL — HIGH (ref 70–99)
GLUCOSE BLDC GLUCOMTR-MCNC: 216 MG/DL — HIGH (ref 70–99)
GLUCOSE BLDC GLUCOMTR-MCNC: 221 MG/DL — HIGH (ref 70–99)
GLUCOSE BLDC GLUCOMTR-MCNC: 259 MG/DL — HIGH (ref 70–99)
GLUCOSE SERPL-MCNC: 294 MG/DL — HIGH (ref 70–99)
GRAM STN FLD: ABNORMAL
GRAM STN FLD: ABNORMAL
HCT VFR BLD CALC: 23.3 % — LOW (ref 34.5–45)
HCT VFR BLD CALC: 27.8 % — LOW (ref 34.5–45)
HGB BLD-MCNC: 6.9 G/DL — CRITICAL LOW (ref 11.5–15.5)
HGB BLD-MCNC: 8.6 G/DL — LOW (ref 11.5–15.5)
IMM GRANULOCYTES # BLD AUTO: 0.05 K/UL — SIGNIFICANT CHANGE UP (ref 0–0.07)
IMM GRANULOCYTES NFR BLD AUTO: 0.8 % — SIGNIFICANT CHANGE UP (ref 0–0.9)
IMMATURE PLATELET FRACTION #: 4.1 K/UL — LOW (ref 4.7–11.1)
IMMATURE PLATELET FRACTION #: 5.5 K/UL — SIGNIFICANT CHANGE UP (ref 4.7–11.1)
IMMATURE PLATELET FRACTION %: 4.3 % — SIGNIFICANT CHANGE UP (ref 1.6–4.9)
IMMATURE PLATELET FRACTION %: 5.3 % — HIGH (ref 1.6–4.9)
LYMPHOCYTES # BLD AUTO: 0.66 K/UL — LOW (ref 1–3.3)
LYMPHOCYTES NFR BLD AUTO: 11.2 % — LOW (ref 13–44)
MAGNESIUM SERPL-MCNC: 2.1 MG/DL — SIGNIFICANT CHANGE UP (ref 1.8–2.4)
MCHC RBC-ENTMCNC: 29 PG — SIGNIFICANT CHANGE UP (ref 27–34)
MCHC RBC-ENTMCNC: 29.6 G/DL — LOW (ref 32–36)
MCHC RBC-ENTMCNC: 29.7 PG — SIGNIFICANT CHANGE UP (ref 27–34)
MCHC RBC-ENTMCNC: 30.9 G/DL — LOW (ref 32–36)
MCV RBC AUTO: 95.9 FL — SIGNIFICANT CHANGE UP (ref 80–100)
MCV RBC AUTO: 97.9 FL — SIGNIFICANT CHANGE UP (ref 80–100)
MONOCYTES # BLD AUTO: 0.35 K/UL — SIGNIFICANT CHANGE UP (ref 0–0.9)
MONOCYTES NFR BLD AUTO: 5.9 % — SIGNIFICANT CHANGE UP (ref 2–14)
NEUTROPHILS # BLD AUTO: 4.82 K/UL — SIGNIFICANT CHANGE UP (ref 1.8–7.4)
NEUTROPHILS NFR BLD AUTO: 81.9 % — HIGH (ref 43–77)
NRBC # BLD AUTO: 0 K/UL — SIGNIFICANT CHANGE UP (ref 0–0)
NRBC # BLD AUTO: 0 K/UL — SIGNIFICANT CHANGE UP (ref 0–0)
NRBC # FLD: 0 K/UL — SIGNIFICANT CHANGE UP (ref 0–0)
NRBC # FLD: 0 K/UL — SIGNIFICANT CHANGE UP (ref 0–0)
NRBC BLD AUTO-RTO: 0 /100 WBCS — SIGNIFICANT CHANGE UP (ref 0–0)
NRBC BLD AUTO-RTO: 0 /100 WBCS — SIGNIFICANT CHANGE UP (ref 0–0)
PLATELET # BLD AUTO: 100 K/UL — LOW (ref 150–400)
PLATELET # BLD AUTO: 96 K/UL — LOW (ref 150–400)
PMV BLD: 11.5 FL — SIGNIFICANT CHANGE UP (ref 7–13)
PMV BLD: 11.9 FL — SIGNIFICANT CHANGE UP (ref 7–13)
POTASSIUM SERPL-MCNC: 2.7 MMOL/L — CRITICAL LOW (ref 3.5–5)
POTASSIUM SERPL-MCNC: 3.6 MMOL/L — SIGNIFICANT CHANGE UP (ref 3.5–5)
POTASSIUM SERPL-SCNC: 2.7 MMOL/L — CRITICAL LOW (ref 3.5–5)
POTASSIUM SERPL-SCNC: 3.6 MMOL/L — SIGNIFICANT CHANGE UP (ref 3.5–5)
PROT SERPL-MCNC: 5.2 G/DL — LOW (ref 6–8)
RBC # BLD: 2.38 M/UL — LOW (ref 3.8–5.2)
RBC # BLD: 2.9 M/UL — LOW (ref 3.8–5.2)
RBC # FLD: 16 % — HIGH (ref 10.3–14.5)
RBC # FLD: 16.1 % — HIGH (ref 10.3–14.5)
SODIUM SERPL-SCNC: 148 MMOL/L — HIGH (ref 135–146)
SPECIMEN SOURCE: SIGNIFICANT CHANGE UP
WBC # BLD: 5.89 K/UL — SIGNIFICANT CHANGE UP (ref 3.8–10.5)
WBC # BLD: 6.15 K/UL — SIGNIFICANT CHANGE UP (ref 3.8–10.5)
WBC # FLD AUTO: 5.89 K/UL — SIGNIFICANT CHANGE UP (ref 3.8–10.5)
WBC # FLD AUTO: 6.15 K/UL — SIGNIFICANT CHANGE UP (ref 3.8–10.5)

## 2025-07-05 PROCEDURE — 71045 X-RAY EXAM CHEST 1 VIEW: CPT | Mod: 26

## 2025-07-05 PROCEDURE — 99233 SBSQ HOSP IP/OBS HIGH 50: CPT

## 2025-07-05 RX ORDER — POTASSIUM CHLORIDE, DEXTROSE MONOHYDRATE AND SODIUM CHLORIDE 150; 5; 900 MG/100ML; G/100ML; MG/100ML
1000 INJECTION, SOLUTION INTRAVENOUS
Refills: 0 | Status: DISCONTINUED | OUTPATIENT
Start: 2025-07-05 | End: 2025-07-06

## 2025-07-05 RX ADMIN — Medication 50 MILLIEQUIVALENT(S): at 14:15

## 2025-07-05 RX ADMIN — MEDPURA VITAMIN A AND D 1 APPLICATION(S): 95 OINTMENT TOPICAL at 18:28

## 2025-07-05 RX ADMIN — Medication 40 MILLIGRAM(S): at 08:28

## 2025-07-05 RX ADMIN — DILTIAZEM HYDROCHLORIDE 30 MILLIGRAM(S): 240 TABLET, EXTENDED RELEASE ORAL at 13:56

## 2025-07-05 RX ADMIN — NAFCILLIN 200 GRAM(S): 2 INJECTION, SOLUTION INTRAVENOUS at 10:20

## 2025-07-05 RX ADMIN — Medication 50 MILLIEQUIVALENT(S): at 18:21

## 2025-07-05 RX ADMIN — POTASSIUM CHLORIDE, DEXTROSE MONOHYDRATE AND SODIUM CHLORIDE 40 MILLILITER(S): 150; 5; 900 INJECTION, SOLUTION INTRAVENOUS at 14:09

## 2025-07-05 RX ADMIN — NAFCILLIN 200 GRAM(S): 2 INJECTION, SOLUTION INTRAVENOUS at 07:04

## 2025-07-05 RX ADMIN — DILTIAZEM HYDROCHLORIDE 30 MILLIGRAM(S): 240 TABLET, EXTENDED RELEASE ORAL at 07:07

## 2025-07-05 RX ADMIN — INSULIN LISPRO 1: 100 INJECTION, SOLUTION INTRAVENOUS; SUBCUTANEOUS at 17:15

## 2025-07-05 RX ADMIN — Medication 40 MILLIEQUIVALENT(S): at 10:34

## 2025-07-05 RX ADMIN — NAFCILLIN 200 GRAM(S): 2 INJECTION, SOLUTION INTRAVENOUS at 21:10

## 2025-07-05 RX ADMIN — Medication 1 APPLICATION(S): at 18:29

## 2025-07-05 RX ADMIN — NAFCILLIN 200 GRAM(S): 2 INJECTION, SOLUTION INTRAVENOUS at 14:15

## 2025-07-05 RX ADMIN — Medication 1 APPLICATION(S): at 07:07

## 2025-07-05 RX ADMIN — Medication 650 MILLIGRAM(S): at 08:48

## 2025-07-05 RX ADMIN — Medication 0.5 MILLIGRAM(S): at 21:11

## 2025-07-05 RX ADMIN — NAFCILLIN 200 GRAM(S): 2 INJECTION, SOLUTION INTRAVENOUS at 18:20

## 2025-07-05 RX ADMIN — NAFCILLIN 200 GRAM(S): 2 INJECTION, SOLUTION INTRAVENOUS at 03:59

## 2025-07-05 RX ADMIN — SERTRALINE 50 MILLIGRAM(S): 100 TABLET, FILM COATED ORAL at 13:56

## 2025-07-05 RX ADMIN — DILTIAZEM HYDROCHLORIDE 30 MILLIGRAM(S): 240 TABLET, EXTENDED RELEASE ORAL at 21:11

## 2025-07-05 RX ADMIN — TIZANIDINE 4 MILLIGRAM(S): 4 TABLET ORAL at 21:11

## 2025-07-05 RX ADMIN — Medication 50 MILLIEQUIVALENT(S): at 16:33

## 2025-07-05 RX ADMIN — INSULIN LISPRO 2: 100 INJECTION, SOLUTION INTRAVENOUS; SUBCUTANEOUS at 12:01

## 2025-07-05 RX ADMIN — Medication 0.5 MILLIGRAM(S): at 21:41

## 2025-07-05 RX ADMIN — INSULIN LISPRO 3: 100 INJECTION, SOLUTION INTRAVENOUS; SUBCUTANEOUS at 08:27

## 2025-07-05 RX ADMIN — MEDPURA VITAMIN A AND D 1 APPLICATION(S): 95 OINTMENT TOPICAL at 07:07

## 2025-07-05 RX ADMIN — POTASSIUM CHLORIDE, DEXTROSE MONOHYDRATE AND SODIUM CHLORIDE 40 MILLILITER(S): 150; 5; 900 INJECTION, SOLUTION INTRAVENOUS at 21:11

## 2025-07-05 RX ADMIN — Medication 650 MILLIGRAM(S): at 09:18

## 2025-07-05 NOTE — CHART NOTE - NSCHARTNOTEFT_GEN_A_CORE
received call from lab for crit result hgb 6.8 and potassium 2.7; results d/W Dr. Downs - DAYNA unit PRBC and 60meq K ordered stat; dc lvx for now  and order SCD        results and plan d/w pt and pt's , verbalizing agreement    On exam    GENERAL: laying in bed, appearing comfortable and in NAD  CHEST/LUNG: even respirations b/l; no accessory muscle use  EXTREMITIES:   No calf TTP b/l; no swelling b/l LE      - f/u 7pm cbc  -f/u 3pm K    consider restarting vte ppx when bld lvls stabilize received call from lab for crit result hgb 6.8 and potassium 2.7; results d/W Dr. Downs - I unit PRBC and 60meq K ordered stat; will c/w lvx given high risk VTE      results and plan d/w pt and pt's , verbalizing agreement    On exam    GENERAL: laying in bed, appearing comfortable and in NAD  CHEST/LUNG: even respirations b/l; no accessory muscle use  EXTREMITIES:   No calf TTP b/l; no swelling b/l LE      - f/u 7pm cbc  -f/u 3pm K  - can consider dc'ing lvx if hgb/plt count doesn't improve and/or gets worsen received call from lab for crit result hgb 6.8 and potassium 2.7; results d/W Dr. Downs - I unit PRBC and 60meq K ordered stat; dc lvx given ins setting of thrombocytopenia       results and plan d/w pt and pt's , verbalizing agreement    On exam    GENERAL: laying in bed, appearing comfortable and in NAD  CHEST/LUNG: even respirations b/l; no accessory muscle use  EXTREMITIES:   No calf TTP b/l; no swelling b/l LE      - f/u 7pm cbc  -f/u 3pm K  - can consider resuming lvx pending hgb/plt count stabilizes received call from lab for crit result hgb 6.8 and potassium 2.7; results d/W Dr. Downs - I unit PRBC and 60meq K ordered stat; dc lvx given ins setting of thrombocytopenia       results and plan d/w pt and pt's , verbalizing agreement    On exam    GENERAL: laying in bed, appearing comfortable and in NAD  CHEST/LUNG: even respirations b/l; no accessory muscle use  EXTREMITIES:   No calf TTP b/l; no swelling b/l LE    - SCDS for vte ppx  - f/u 7pm cbc and potassium   - can consider resuming lvx pending hgb/plt count stabilizes

## 2025-07-05 NOTE — PROGRESS NOTE ADULT - SUBJECTIVE AND OBJECTIVE BOX
Patient is a 74y old  Female who presents with a chief complaint of weakness (04 Jul 2025 10:41)      OVERNIGHT EVENTS: remained stable     SUBJECTIVE / INTERVAL HPI: Patient seen and examined at bedside.     VITAL SIGNS:  Vital Signs Last 24 Hrs  T(C): 36.7 (05 Jul 2025 04:10), Max: 36.8 (04 Jul 2025 20:22)  T(F): 98 (05 Jul 2025 04:10), Max: 98.3 (04 Jul 2025 20:22)  HR: 81 (05 Jul 2025 04:10) (69 - 89)  BP: 117/73 (05 Jul 2025 04:10) (91/64 - 134/67)  BP(mean): 74 (04 Jul 2025 14:20) (74 - 74)  RR: 18 (05 Jul 2025 04:10) (18 - 18)  SpO2: 96% (05 Jul 2025 04:10) (93% - 99%)    Parameters below as of 05 Jul 2025 04:10  Patient On (Oxygen Delivery Method): nasal cannula  O2 Flow (L/min): 3      PHYSICAL EXAM:    General: dario thin lady chronically looks ill   HEENT: NC/AT; PERRL, clear conjunctiva  Neck: supple  Cardiovascular: +S1/S2; RRR  Respiratory: CTA b/l; no W/R/R  Gastrointestinal: soft, NT/ND; +BSx4  Extremities: WWP; 2+ peripheral pulses; no edema   Neurological: AAOx3; globally weak, no focal deficits    MEDICATIONS:  MEDICATIONS  (STANDING):  bacitracin   Ointment 1 Application(s) Topical two times a day  chlorhexidine 2% Cloths 1 Application(s) Topical <User Schedule>  dextrose 5%. 1000 milliLiter(s) (50 mL/Hr) IV Continuous <Continuous>  dextrose 5%. 1000 milliLiter(s) (100 mL/Hr) IV Continuous <Continuous>  dextrose 50% Injectable 25 Gram(s) IV Push once  dextrose 50% Injectable 12.5 Gram(s) IV Push once  dextrose 50% Injectable 25 Gram(s) IV Push once  diltiazem    Tablet 30 milliGRAM(s) Oral every 8 hours  enoxaparin Injectable 40 milliGRAM(s) SubCutaneous every 24 hours  glucagon  Injectable 1 milliGRAM(s) IntraMuscular once  insulin lispro (ADMELOG) corrective regimen sliding scale   SubCutaneous three times a day before meals  nafcillin  IVPB 2 Gram(s) IV Intermittent every 4 hours  pantoprazole    Tablet 40 milliGRAM(s) Oral before breakfast  potassium chloride  20 mEq/100 mL IVPB 20 milliEquivalent(s) IV Intermittent once  sertraline 50 milliGRAM(s) Oral daily  tiZANidine 4 milliGRAM(s) Oral at bedtime  vitamin A & D Ointment 1 Application(s) Topical two times a day    MEDICATIONS  (PRN):  acetaminophen     Tablet .. 650 milliGRAM(s) Oral every 6 hours PRN Temp greater or equal to 38C (100.4F), Mild Pain (1 - 3)  dextrose Oral Gel 15 Gram(s) Oral once PRN Blood Glucose LESS THAN 70 milliGRAM(s)/deciliter  HYDROmorphone  Injectable 0.5 milliGRAM(s) IV Push every 3 hours PRN Severe Pain (7 - 10)  metoprolol tartrate Injectable 5 milliGRAM(s) IV Push every 6 hours PRN heart rate above 110      ALLERGIES:  Allergies    No Known Allergies    Intolerances        LABS:                        6.8    6.93  )-----------( 91       ( 04 Jul 2025 16:05 )             23.1     07-05    148[H]  |  111[H]  |  21[H]  ----------------------------<  294[H]  2.7[LL]   |  23  |  0.6[L]    Ca    8.4      05 Jul 2025 07:49  Mg     2.1     07-05    TPro  5.2[L]  /  Alb  1.9[L]  /  TBili  0.5  /  DBili  x   /  AST  10  /  ALT  <5  /  AlkPhos  90  07-05      Urinalysis Basic - ( 05 Jul 2025 07:49 )    Color: x / Appearance: x / SG: x / pH: x  Gluc: 294 mg/dL / Ketone: x  / Bili: x / Urobili: x   Blood: x / Protein: x / Nitrite: x   Leuk Esterase: x / RBC: x / WBC x   Sq Epi: x / Non Sq Epi: x / Bacteria: x      CAPILLARY BLOOD GLUCOSE      POCT Blood Glucose.: 216 mg/dL (05 Jul 2025 11:31)      RADIOLOGY & ADDITIONAL TESTS: Reviewed.    ASSESSMENT:    PLAN:

## 2025-07-05 NOTE — CHART NOTE - NSCHARTNOTEFT_GEN_A_CORE
received call from lab relaying 7/3 cx demonstrating growth of gram positive cocci in clusters  ID following and pt on nafcillin     - ID f/u

## 2025-07-05 NOTE — PROGRESS NOTE ADULT - ASSESSMENT
This is a 80-year-old female with past medical history of hypertension, hyperlipidemia, DVT being treated for pancreatic cancer on chemotherapy with chest port brought to ed for evaluation of the weakness ans s/p fall.    MSSA bacteremia- Source likely chemoport  Multifocal Pneumonia / acute hypoxemic resp failure   Thrombocytopenia   Metabolic encephalopathy   Fall with Sacral bruising  CKD 3  Transaminitis  Pancreatic CA on chemo via left sided Chest wall port  Pulmonary Nodules  Afib RVR   HO HTN  HO DVT     - Bcx noted, MSSA, still persistently positive, repeat till cleared,   - s/p chemoport removal  - TTe cant r/o vegetation on tricuspid. will need JOSE   - Hbg dropped today, no evidence of bleeding, give 1 u prbc, hold dvt ppx, laso has been off eliquis  - Plt noted, has been low, 2/2 infection, active Ca and chemo   -heme/onc appreciated very poor prognosis   Dilaudid per palliative   - pureed diet for now, can remove NGT if able to take half of meals by mouth, Na noted, D5+LR+20KCL  - replace K, tele monitor,   - freeman, TOV  after PT eval, PT/ OT eval,   - GOC: DNR/DNI  - very poor prognosis,  aware that pt is at high risk of decompensation  - spent 55 mins eval pt and coordinating care, charts, images and labs reviewed

## 2025-07-06 LAB
ALBUMIN SERPL ELPH-MCNC: 2 G/DL — LOW (ref 3.5–5.2)
ALP SERPL-CCNC: 94 U/L — SIGNIFICANT CHANGE UP (ref 30–115)
ALT FLD-CCNC: <5 U/L — SIGNIFICANT CHANGE UP (ref 0–41)
ANION GAP SERPL CALC-SCNC: 11 MMOL/L — SIGNIFICANT CHANGE UP (ref 7–14)
AST SERPL-CCNC: 8 U/L — SIGNIFICANT CHANGE UP (ref 0–41)
BASOPHILS # BLD AUTO: 0.01 K/UL — SIGNIFICANT CHANGE UP (ref 0–0.2)
BASOPHILS NFR BLD AUTO: 0.2 % — SIGNIFICANT CHANGE UP (ref 0–2)
BILIRUB SERPL-MCNC: 0.5 MG/DL — SIGNIFICANT CHANGE UP (ref 0.2–1.2)
BUN SERPL-MCNC: 16 MG/DL — SIGNIFICANT CHANGE UP (ref 10–20)
CALCIUM SERPL-MCNC: 8.3 MG/DL — LOW (ref 8.4–10.5)
CHLORIDE SERPL-SCNC: 112 MMOL/L — HIGH (ref 98–110)
CO2 SERPL-SCNC: 25 MMOL/L — SIGNIFICANT CHANGE UP (ref 17–32)
CREAT SERPL-MCNC: 0.5 MG/DL — LOW (ref 0.7–1.5)
CULTURE RESULTS: ABNORMAL
EGFR: 99 ML/MIN/1.73M2 — SIGNIFICANT CHANGE UP
EGFR: 99 ML/MIN/1.73M2 — SIGNIFICANT CHANGE UP
EOSINOPHIL # BLD AUTO: 0.01 K/UL — SIGNIFICANT CHANGE UP (ref 0–0.5)
EOSINOPHIL NFR BLD AUTO: 0.2 % — SIGNIFICANT CHANGE UP (ref 0–6)
GLUCOSE BLDC GLUCOMTR-MCNC: 214 MG/DL — HIGH (ref 70–99)
GLUCOSE BLDC GLUCOMTR-MCNC: 220 MG/DL — HIGH (ref 70–99)
GLUCOSE BLDC GLUCOMTR-MCNC: 262 MG/DL — HIGH (ref 70–99)
GLUCOSE BLDC GLUCOMTR-MCNC: 266 MG/DL — HIGH (ref 70–99)
GLUCOSE SERPL-MCNC: 267 MG/DL — HIGH (ref 70–99)
GRAM STN FLD: ABNORMAL
GRAM STN FLD: ABNORMAL
HCT VFR BLD CALC: 27.8 % — LOW (ref 34.5–45)
HGB BLD-MCNC: 8.5 G/DL — LOW (ref 11.5–15.5)
IMM GRANULOCYTES # BLD AUTO: 0.05 K/UL — SIGNIFICANT CHANGE UP (ref 0–0.07)
IMM GRANULOCYTES NFR BLD AUTO: 0.8 % — SIGNIFICANT CHANGE UP (ref 0–0.9)
LYMPHOCYTES # BLD AUTO: 0.68 K/UL — LOW (ref 1–3.3)
LYMPHOCYTES NFR BLD AUTO: 10.3 % — LOW (ref 13–44)
MAGNESIUM SERPL-MCNC: 1.9 MG/DL — SIGNIFICANT CHANGE UP (ref 1.8–2.4)
MCHC RBC-ENTMCNC: 29.4 PG — SIGNIFICANT CHANGE UP (ref 27–34)
MCHC RBC-ENTMCNC: 30.6 G/DL — LOW (ref 32–36)
MCV RBC AUTO: 96.2 FL — SIGNIFICANT CHANGE UP (ref 80–100)
MONOCYTES # BLD AUTO: 0.33 K/UL — SIGNIFICANT CHANGE UP (ref 0–0.9)
MONOCYTES NFR BLD AUTO: 5 % — SIGNIFICANT CHANGE UP (ref 2–14)
NEUTROPHILS # BLD AUTO: 5.52 K/UL — SIGNIFICANT CHANGE UP (ref 1.8–7.4)
NEUTROPHILS NFR BLD AUTO: 83.5 % — HIGH (ref 43–77)
NRBC # BLD AUTO: 0 K/UL — SIGNIFICANT CHANGE UP (ref 0–0)
NRBC # FLD: 0 K/UL — SIGNIFICANT CHANGE UP (ref 0–0)
NRBC BLD AUTO-RTO: 0 /100 WBCS — SIGNIFICANT CHANGE UP (ref 0–0)
PLATELET # BLD AUTO: 115 K/UL — LOW (ref 150–400)
PMV BLD: 11.5 FL — SIGNIFICANT CHANGE UP (ref 7–13)
POTASSIUM SERPL-MCNC: 3.6 MMOL/L — SIGNIFICANT CHANGE UP (ref 3.5–5)
POTASSIUM SERPL-SCNC: 3.6 MMOL/L — SIGNIFICANT CHANGE UP (ref 3.5–5)
PROT SERPL-MCNC: 5.2 G/DL — LOW (ref 6–8)
RBC # BLD: 2.89 M/UL — LOW (ref 3.8–5.2)
RBC # FLD: 17.1 % — HIGH (ref 10.3–14.5)
SODIUM SERPL-SCNC: 148 MMOL/L — HIGH (ref 135–146)
SPECIMEN SOURCE: SIGNIFICANT CHANGE UP
SPECIMEN SOURCE: SIGNIFICANT CHANGE UP
WBC # BLD: 6.6 K/UL — SIGNIFICANT CHANGE UP (ref 3.8–10.5)
WBC # FLD AUTO: 6.6 K/UL — SIGNIFICANT CHANGE UP (ref 3.8–10.5)

## 2025-07-06 PROCEDURE — 71045 X-RAY EXAM CHEST 1 VIEW: CPT | Mod: 26

## 2025-07-06 PROCEDURE — 99233 SBSQ HOSP IP/OBS HIGH 50: CPT

## 2025-07-06 RX ORDER — DEXTROSE 50 % IN WATER 50 %
25 SYRINGE (ML) INTRAVENOUS ONCE
Refills: 0 | Status: DISCONTINUED | OUTPATIENT
Start: 2025-07-06 | End: 2025-07-11

## 2025-07-06 RX ORDER — INSULIN GLARGINE-YFGN 100 [IU]/ML
6 INJECTION, SOLUTION SUBCUTANEOUS AT BEDTIME
Refills: 0 | Status: DISCONTINUED | OUTPATIENT
Start: 2025-07-06 | End: 2025-07-11

## 2025-07-06 RX ORDER — INSULIN LISPRO 100 U/ML
2 INJECTION, SOLUTION INTRAVENOUS; SUBCUTANEOUS
Refills: 0 | Status: DISCONTINUED | OUTPATIENT
Start: 2025-07-06 | End: 2025-07-09

## 2025-07-06 RX ORDER — SODIUM CHLORIDE 9 G/1000ML
1000 INJECTION, SOLUTION INTRAVENOUS
Refills: 0 | Status: DISCONTINUED | OUTPATIENT
Start: 2025-07-06 | End: 2025-07-11

## 2025-07-06 RX ORDER — IOHEXOL 350 MG/ML
30 INJECTION, SOLUTION INTRAVENOUS ONCE
Refills: 0 | Status: COMPLETED | OUTPATIENT
Start: 2025-07-06 | End: 2025-07-06

## 2025-07-06 RX ORDER — GLUCAGON 3 MG/1
1 POWDER NASAL ONCE
Refills: 0 | Status: DISCONTINUED | OUTPATIENT
Start: 2025-07-06 | End: 2025-07-11

## 2025-07-06 RX ORDER — INSULIN LISPRO 100 U/ML
INJECTION, SOLUTION INTRAVENOUS; SUBCUTANEOUS
Refills: 0 | Status: DISCONTINUED | OUTPATIENT
Start: 2025-07-06 | End: 2025-07-09

## 2025-07-06 RX ORDER — DEXTROSE 50 % IN WATER 50 %
15 SYRINGE (ML) INTRAVENOUS ONCE
Refills: 0 | Status: DISCONTINUED | OUTPATIENT
Start: 2025-07-06 | End: 2025-07-11

## 2025-07-06 RX ORDER — DEXTROSE 50 % IN WATER 50 %
12.5 SYRINGE (ML) INTRAVENOUS ONCE
Refills: 0 | Status: DISCONTINUED | OUTPATIENT
Start: 2025-07-06 | End: 2025-07-11

## 2025-07-06 RX ADMIN — Medication 0.5 MILLIGRAM(S): at 10:05

## 2025-07-06 RX ADMIN — DILTIAZEM HYDROCHLORIDE 30 MILLIGRAM(S): 240 TABLET, EXTENDED RELEASE ORAL at 13:59

## 2025-07-06 RX ADMIN — NAFCILLIN 200 GRAM(S): 2 INJECTION, SOLUTION INTRAVENOUS at 17:55

## 2025-07-06 RX ADMIN — INSULIN LISPRO 6: 100 INJECTION, SOLUTION INTRAVENOUS; SUBCUTANEOUS at 17:10

## 2025-07-06 RX ADMIN — Medication 0.5 MILLIGRAM(S): at 20:20

## 2025-07-06 RX ADMIN — NAFCILLIN 200 GRAM(S): 2 INJECTION, SOLUTION INTRAVENOUS at 05:25

## 2025-07-06 RX ADMIN — INSULIN LISPRO 3: 100 INJECTION, SOLUTION INTRAVENOUS; SUBCUTANEOUS at 12:23

## 2025-07-06 RX ADMIN — DILTIAZEM HYDROCHLORIDE 30 MILLIGRAM(S): 240 TABLET, EXTENDED RELEASE ORAL at 21:24

## 2025-07-06 RX ADMIN — INSULIN GLARGINE-YFGN 6 UNIT(S): 100 INJECTION, SOLUTION SUBCUTANEOUS at 21:24

## 2025-07-06 RX ADMIN — Medication 1 APPLICATION(S): at 05:19

## 2025-07-06 RX ADMIN — NAFCILLIN 200 GRAM(S): 2 INJECTION, SOLUTION INTRAVENOUS at 13:59

## 2025-07-06 RX ADMIN — Medication 0.5 MILLIGRAM(S): at 09:50

## 2025-07-06 RX ADMIN — Medication 1 APPLICATION(S): at 18:00

## 2025-07-06 RX ADMIN — Medication 40 MILLIGRAM(S): at 05:19

## 2025-07-06 RX ADMIN — NAFCILLIN 200 GRAM(S): 2 INJECTION, SOLUTION INTRAVENOUS at 09:50

## 2025-07-06 RX ADMIN — NAFCILLIN 200 GRAM(S): 2 INJECTION, SOLUTION INTRAVENOUS at 02:35

## 2025-07-06 RX ADMIN — TIZANIDINE 4 MILLIGRAM(S): 4 TABLET ORAL at 21:24

## 2025-07-06 RX ADMIN — Medication 1 APPLICATION(S): at 05:25

## 2025-07-06 RX ADMIN — INSULIN LISPRO 2: 100 INJECTION, SOLUTION INTRAVENOUS; SUBCUTANEOUS at 08:00

## 2025-07-06 RX ADMIN — NAFCILLIN 200 GRAM(S): 2 INJECTION, SOLUTION INTRAVENOUS at 21:58

## 2025-07-06 RX ADMIN — Medication 0.5 MILLIGRAM(S): at 20:50

## 2025-07-06 RX ADMIN — INSULIN LISPRO 2 UNIT(S): 100 INJECTION, SOLUTION INTRAVENOUS; SUBCUTANEOUS at 17:09

## 2025-07-06 RX ADMIN — MEDPURA VITAMIN A AND D 1 APPLICATION(S): 95 OINTMENT TOPICAL at 05:19

## 2025-07-06 RX ADMIN — DILTIAZEM HYDROCHLORIDE 30 MILLIGRAM(S): 240 TABLET, EXTENDED RELEASE ORAL at 05:19

## 2025-07-06 RX ADMIN — MEDPURA VITAMIN A AND D 1 APPLICATION(S): 95 OINTMENT TOPICAL at 17:55

## 2025-07-06 RX ADMIN — SERTRALINE 50 MILLIGRAM(S): 100 TABLET, FILM COATED ORAL at 12:25

## 2025-07-06 NOTE — PROGRESS NOTE ADULT - SUBJECTIVE AND OBJECTIVE BOX
LENGTH OF HOSPITAL STAY: 9d    CHIEF COMPLAINT:    Patient is a 74y old  Female who presents with a chief complaint of weakness (06 Jul 2025 08:27)    OVERNIGHT EVENTS:    - tele reviewed by me NSR 82 no events  - no overnight events  - pt examined at bedside, family at bedside, discussed plan and answered questions  - tolerating PO w/ tube feeds, having BMs w/ dignishield, making urine freeman    FOLLOW UP:    - BCx for clearance  - CT pan scan w/ con for occult infection  - hebert  - remove dignishield    HOSPITAL COURSE:    ICU Course:  - patient was found to have MSSA bacteremia, abx narrowed to Nafcillin; Chemo port removed 7/2 with pus noted in the cavity  - course c/b tachycardia, cardiology evaluated patient found to have premature atrial complexes; TTE showed thickening of tricuspid valve; vetgation not ruled out  - GOC discussed with patient's ; pt is DNR/DNI. Given patient's overall poor prognosis there is strong consideration to treat empirically and avoid HEBERT, however if pt continues to clinically improve HEBERT can be considered.  - for thrombocytopenia, patient was transfused 1 plt, AC restarted 7/3, Heme Onc was consulted  - 7/5 received 1u pRBC  - 7/6 auto-dc'd NGT    HPI:       80-year-old female with past medical history of hypertension hyperlipidemia DVT on Eliquis recently being treated for pancreatic cancer on chemotherapy with chest port brought to ed for evaluation of the weakness. Hx taken from the patient  at bedside. States that for the past few days, patient has been very weak, dehydrated, not eating and drinking well, unable to walk and unable to take care of her self. Also reported that patient has been more sleepy recently and had a fall a few back.  said she normally has some dementia however the last 3 days, Patient was recently becoming more demented prior to the fall.  Patient complaining of diffuse body pain afterwards  (27 Jun 2025 15:17)      ALLERGIES:    No Known Allergies    MEDICATIONS:  STANDING MEDICATIONS  bacitracin   Ointment 1 Application(s) Topical two times a day  chlorhexidine 2% Cloths 1 Application(s) Topical <User Schedule>  diltiazem    Tablet 30 milliGRAM(s) Oral every 8 hours  nafcillin  IVPB 2 Gram(s) IV Intermittent every 4 hours  pantoprazole    Tablet 40 milliGRAM(s) Oral before breakfast  sertraline 50 milliGRAM(s) Oral daily  tiZANidine 4 milliGRAM(s) Oral at bedtime  vitamin A & D Ointment 1 Application(s) Topical two times a day    PRN MEDICATIONS  acetaminophen     Tablet .. 650 milliGRAM(s) Oral every 6 hours PRN  HYDROmorphone  Injectable 0.5 milliGRAM(s) IV Push every 3 hours PRN  metoprolol tartrate Injectable 5 milliGRAM(s) IV Push every 6 hours PRN      LABS:                        8.5    6.60  )-----------( 115      ( 06 Jul 2025 07:42 )             27.8     07-06    148[H]  |  112[H]  |  16  ----------------------------<  267[H]  3.6   |  25  |  0.5[L]    Ca    8.3[L]      06 Jul 2025 07:42  Mg     1.9     07-06    TPro  5.2[L]  /  Alb  2.0[L]  /  TBili  0.5  /  DBili  x   /  AST  8   /  ALT  <5  /  AlkPhos  94  07-06      Urinalysis Basic - ( 06 Jul 2025 07:42 )    Color: x / Appearance: x / SG: x / pH: x  Gluc: 267 mg/dL / Ketone: x  / Bili: x / Urobili: x   Blood: x / Protein: x / Nitrite: x   Leuk Esterase: x / RBC: x / WBC x   Sq Epi: x / Non Sq Epi: x / Bacteria: x            Culture - Blood (collected 05 Jul 2025 07:49)  Source: Blood None  Gram Stain (06 Jul 2025 14:21):    Growth in aerobic bottle: Gram Positive Cocci in Clusters  Preliminary Report (06 Jul 2025 14:22):    Growth in aerobic bottle: Gram Positive Cocci in Clusters    Culture - Blood (collected 04 Jul 2025 05:52)  Source: Blood None  Gram Stain (05 Jul 2025 19:09):    Growth in anaerobic bottle: Gram Positive Cocci in Clusters    Growth in aerobic bottle: Gram Positive Cocci in Clusters  Final Report (06 Jul 2025 11:13):    Growth in aerobic and anaerobic bottles: Staphylococcus aureus    See previous culture [id number]        VITALS:   T(F): 97.4  HR: 77  BP: 117/72  RR: 18  SpO2: 94%    PHYSICAL EXAM:    Gen: NAD, resting in bed; thin/frail/ill appearing  HEENT: Normocephalic, atraumatic; ngt  Neck: supple, no lymphadenopathy  CV: Regular rate & regular rhythm; chest wall clean/dressed former port site  Lungs: decreased BS at bases, no fremitus  Abdomen: Soft, BS present, non tender, scaphoid  Ext: Warm, well perfused  Neuro: moves all extremities against resistance, no droop, awake  Skin: no rash, no erythema

## 2025-07-06 NOTE — PROGRESS NOTE ADULT - ASSESSMENT
This is a 80-year-old female with past medical history of hypertension hyperlipidemia DVT being treated for pancreatic cancer on chemotherapy with chest port brought to ed for evaluation of the weakness.    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    Covid/Flu/RSV negative   MRSA nares negative     RECOMMENDATIONS  -Repeat blood cultures daily.  -Urine cultures noted for E.coli (Sensitive to Cefazolin)  -TTE noted. Tricuspid valve thickening. Can not rule out vegetation. Given persistent Bacteremia discuss with Cardiology for JOSE  -Obtain CT chest/abdomen/Pelvis preferably with IV contrast to evaluate for any other pocket of infection.  -Port explanted 7/2/2025-Pus noted in the cavity   -IV Naficillin 2 gram q 4 hours.   -Local wound care for the sacral injury. Avoid constipation.  -Off loading to prevent pressure sores and preventive measures to avoid aspiration. Very Guarded prognosis. GOC.    If any questions, please send a message or call on [employer] Teams  Please continue to update ID with any pertinent new laboratory or radiographic findings.    Amado Irwni M.D  Infectious Diseases Attending/   [employer] at [facility]   This is a 80-year-old female with past medical history of hypertension hyperlipidemia DVT being treated for pancreatic cancer on chemotherapy with chest port brought to ed for evaluation of the weakness.    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    Covid/Flu/RSV negative   MRSA nares negative     RECOMMENDATIONS  -Repeat blood cultures daily.  -Urine cultures noted for E.coli (Sensitive to Cefazolin)  -TTE noted. Tricuspid valve thickening. Can not rule out vegetation. Given persistent Bacteremia discuss with Cardiology for JOSE  -Obtain CT chest/abdomen/Pelvis preferably with IV contrast to evaluate for any other pocket of infection.  -Port explanted 7/2/2025-Pus noted in the cavity   -IV Naficillin 2 gram q 4 hours.   -Local wound care for the sacral injury. Avoid constipation.  -Off loading to prevent pressure sores and preventive measures to avoid aspiration. Very Guarded prognosis. GOC.    If any questions, please send a message or call on [employer] Teams  Please continue to update ID with any pertinent new laboratory or radiographic findings.    Amado Irwin M.D  Infectious Diseases Attending/   [employer] at [facility]   This is a 80-year-old female with past medical history of hypertension hyperlipidemia DVT being treated for pancreatic cancer on chemotherapy with chest port brought to ed for evaluation of the weakness.    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    Covid/Flu/RSV negative   MRSA nares negative     RECOMMENDATIONS  -Repeat blood cultures daily.  -Urine cultures noted for E.coli (Sensitive to Cefazolin)  -TTE noted. Tricuspid valve thickening. Can not rule out vegetation. Given persistent Bacteremia discuss with Cardiology for JOSE  -Obtain CT chest/abdomen/Pelvis preferably with IV contrast to evaluate for any other pocket of infection.  -Port explanted 7/2/2025-Pus noted in the cavity   -IV Naficillin 2 gram q 4 hours.   -Local wound care for the sacral injury. Avoid constipation.  -Off loading to prevent pressure sores and preventive measures to avoid aspiration. Very Guarded prognosis. GOC. TOV when able.    If any questions, please send a message or call on [employer] Teams  Please continue to update ID with any pertinent new laboratory or radiographic findings.    Amado Irwin M.D  Infectious Diseases Attending/   [employer] at \[facility]\""/[facility]

## 2025-07-06 NOTE — SWALLOW BEDSIDE ASSESSMENT ADULT - DIET PRIOR TO ADMI
regular consistency + thin liquids

## 2025-07-06 NOTE — CHART NOTE - NSCHARTNOTEFT_GEN_A_CORE
80-year-old female with past medical history of hypertension hyperlipidemia DVT on Eliquis recently being treated for pancreatic cancer on chemotherapy with chest port brought to ed for evaluation of the weakness.    OUTPATIENT CARDIOLOGIST:  Beatrice johnson Patient lethargic, in NAD    IMPRESSION:  #MSSA bacteremia- Unclear Source, possible chemoport  #Multifocal Pneumonia   #Thrombocytopenia s/p platelets  #Pancreatic CA on chemo via left sided Chest wall port/ possible surgical removal today  #AFib c RVR?    Telemetry appears to be sinus w/ PACs  EKG: Sinus tachycardia with premature atrial complexes  TTE 6/30/2025: Normal LVSF, EF 55-60%, Small pericardial effusion noted adjacent to the anterior right ventricle with no echocardiographic evidence of tamponade physiology. Possible thickening of tricuspid valve. Cannot rule out vegetation.  Ideally would need JOSE but Pt with patient with poor functional status, debility and is DNR/DNI. Also per prior discussions with Pt's spouse at bedside, they preferred conservative medical management, however ID is recommending JOSE to determine antibiotic course short term vs long term. Discussed at bedside with Pt's spouse again and he has changed his mind agrees. Will discus with JOSE team at [facility] if candidate for procedure.    Cont with medical management per primary team      Discussed with Hospitalist Dr. Bhandari

## 2025-07-06 NOTE — SWALLOW BEDSIDE ASSESSMENT ADULT - SLP GENERAL OBSERVATIONS
Pt received asleep, and minimally responsive (inconsistent moaning) to noxious and verbal stimuli. Not appropriate for PO trials.
Pt received awake and alert. NGT in situ. Oriented to person and place.
Pt received asleep, and minimally responsive (inconsistent moaning) to noxious and verbal stimuli. Not appropriate for PO trials.
Pt received awake and alert. NGT in situ. Oriented to person and place. Family member at b/s.

## 2025-07-06 NOTE — PROGRESS NOTE ADULT - ASSESSMENT
Pancreatic CA on chemo via left sided Chest wall port  MSSA bacteremia- Source likely chemoport  Multifocal Pneumonia / acute hypoxemic resp failure   Pulmonary Nodules  - Bcx noted, MSSA, still persistently positive, repeat till cleared  - BCx 7/4 positive 7/5 pending 7/6 pending 7/7 ordered  - s/p chemoport removal  - TTe cant r/o vegetation on tricuspid. will need JOSE   - pan scan for other sources of infection  - id appreciated  Dilaudid per palliative     dysphagia  - pureed diet for now, can remove NGT if able to take half of meals by mouth, Na noted, D5+LR+20KCL    Thrombocytopenia   Acute anemia  - Hbg dropped 7/5, no evidence of bleeding, give 1 u prbc, hold dvt ppx, off eliquis  - Plt noted, has been low, 2/2 infection, active Ca and chemo   -heme/onc appreciated very poor prognosis     Fall with Sacral bruising    CKD 3 pre renal pete  - freeman, TOV  after PT eval, PT/ OT eval,       Transaminitis    Afib RVR   sinus now holding ac    HO HTN    HO DVT       - GOC: DNR/DNI  very poor prognosis,  aware that pt is at high risk of decompensation

## 2025-07-06 NOTE — SWALLOW BEDSIDE ASSESSMENT ADULT - SLP PERTINENT HISTORY OF CURRENT PROBLEM
74-year-old female with past medical history of hypertension hyperlipidemia DVT on Eliquis recently being treated for pancreatic cancer on chemotherapy with chest port brought to ed for evaluation of the weakness. Hx taken from the patient  at bedside. States that for the past few days, patient has been very weak, dehydrated, not eating and drinking well, unable to walk and unable to take care of her self. CT chest 6/27 - multifocal PNA? CT chest 6/29 - Lung parenchyma/Pleura: New left basilar opacity/effusion
74-year-old female with past medical history of hypertension hyperlipidemia DVT on Eliquis recently being treated for pancreatic cancer on chemotherapy with chest port brought to ed for evaluation of the weakness. Hx taken from the patient  at bedside. States that for the past few days, patient has been very weak, dehydrated, not eating and drinking well, unable to walk and unable to take care of her self. CT chest 6/27 - multifocal PNA? CT chest 6/29 - Lung parenchyma/Pleura: New left basilar opacity/effusion.

## 2025-07-06 NOTE — SWALLOW BEDSIDE ASSESSMENT ADULT - CONSISTENCIES ADMINISTERED
MIN trials accepted/thin liquid/pureed
not appropriate for PO intake at this time
not appropriate for PO intake at this time
thin liquid/pureed/soft & bite-sized

## 2025-07-06 NOTE — SWALLOW BEDSIDE ASSESSMENT ADULT - NS ASR SWALLOW FINDINGS DISCUS
ANGEL Jean-Baptiste/Nursing
ANGEL Jean-Baptiste/Sole/Nursing
LUKAS Espinoza, ANGEL Bright/Physician/Nursing
ANGEL Ruelas/Nursing

## 2025-07-06 NOTE — PROGRESS NOTE ADULT - SUBJECTIVE AND OBJECTIVE BOX
LINDA ONEAL  74y, Female    LOS  9d    INTERVAL EVENTS/HPI  - T(F): , Max: 97.8 (07-05-25 @ 11:56)  - WBC Count: 6.15 (07-05-25 @ 19:06)  WBC Count: 5.89 (07-05-25 @ 07:49)  - Creatinine: 0.6 (07-05-25 @ 07:49)  Creatinine: 0.6 (07-04-25 @ 16:05)    REVIEW OF SYSTEMS:  CONSTITUTIONAL: No fever or chills  HEENT: No sore throat  RESPIRATORY: No cough, no shortness of breath  CARDIOVASCULAR: No chest pain or palpitations  GASTROINTESTINAL: No abdominal or epigastric pain  GENITOURINARY: No dysuria  NEUROLOGICAL: No headache/dizziness  MSK: No joint pain, erythema, or swelling; no back pain  SKIN: No itching, rashes  All other ROS negative except noted above    Prior hospital charts reviewed [Yes]  Primary team notes reviewed [Yes]  Other consultant notes reviewed [Yes]    ANTIMICROBIALS:   nafcillin  IVPB 2 every 4 hours      OTHER MEDS: MEDICATIONS  (STANDING):  acetaminophen     Tablet .. 650 every 6 hours PRN  dextrose 50% Injectable 25 once  dextrose 50% Injectable 12.5 once  dextrose 50% Injectable 25 once  dextrose Oral Gel 15 once PRN  diltiazem    Tablet 30 every 8 hours  glucagon  Injectable 1 once  HYDROmorphone  Injectable 0.5 every 3 hours PRN  insulin lispro (ADMELOG) corrective regimen sliding scale  three times a day before meals  metoprolol tartrate Injectable 5 every 6 hours PRN  pantoprazole    Tablet 40 before breakfast  sertraline 50 daily  tiZANidine 4 at bedtime      Vital Signs Last 24 Hrs  T(F): 97.3 (07-06-25 @ 04:11), Max: 98.3 (07-04-25 @ 20:22)    Vital Signs Last 24 Hrs  HR: 61 (07-06-25 @ 04:11) (50 - 76)  BP: 120/73 (07-06-25 @ 04:11) (120/73 - 152/73)  RR: 18 (07-06-25 @ 04:11)  SpO2: 92% (07-06-25 @ 04:11) (92% - 100%)  Wt(kg): --    EXAM:  GENERAL: In NAD, On NC  NECK: Supple  CHEST/LUNG: Shallow breath sounds.   HEART: S1 S2  ABDOMEN: Soft, nontender  EXTREMITIES: No clubbing  NERVOUS SYSTEM: Awake.   MSK: No joint erythema, swelling or pain      Labs:                        8.6    6.15  )-----------( 100      ( 05 Jul 2025 19:06 )             27.8     07-05    x   |  x   |  x   ----------------------------<  x   3.6   |  x   |  x     Ca    8.4      05 Jul 2025 07:49  Mg     2.1     07-05    TPro  5.2[L]  /  Alb  1.9[L]  /  TBili  0.5  /  DBili  x   /  AST  10  /  ALT  <5  /  AlkPhos  90  07-05      WBC Trend:  WBC Count: 6.15 (07-05-25 @ 19:06)  WBC Count: 5.89 (07-05-25 @ 07:49)  WBC Count: 6.93 (07-04-25 @ 16:05)  WBC Count: 7.50 (07-04-25 @ 05:52)      Creatine Trend:  Creatinine: 0.6 (07-05)  Creatinine: 0.6 (07-04)  Creatinine: 0.6 (07-04)  Creatinine: 0.7 (07-03)      Liver Biochemical Testing Trend:  Alanine Aminotransferase (ALT/SGPT): <5 (07-05)  Alanine Aminotransferase (ALT/SGPT): <5 (07-04)  Alanine Aminotransferase (ALT/SGPT): <5 (07-03)  Alanine Aminotransferase (ALT/SGPT): <5 (07-02)  Alanine Aminotransferase (ALT/SGPT): 6 (07-01)  Aspartate Aminotransferase (AST/SGOT): 10 (07-05-25 @ 07:49)  Aspartate Aminotransferase (AST/SGOT): 10 (07-04-25 @ 05:52)  Aspartate Aminotransferase (AST/SGOT): 13 (07-03-25 @ 05:48)  Aspartate Aminotransferase (AST/SGOT): 13 (07-02-25 @ 05:42)  Aspartate Aminotransferase (AST/SGOT): 20 (07-01-25 @ 05:48)  Bilirubin Total: 0.5 (07-05)  Bilirubin Total: 0.5 (07-04)  Bilirubin Total: 0.6 (07-03)  Bilirubin Total: 0.7 (07-02)  Bilirubin Total: 0.9 (07-01)      Trend LDH      Urinalysis Basic - ( 05 Jul 2025 07:49 )    Color: x / Appearance: x / SG: x / pH: x  Gluc: 294 mg/dL / Ketone: x  / Bili: x / Urobili: x   Blood: x / Protein: x / Nitrite: x   Leuk Esterase: x / RBC: x / WBC x   Sq Epi: x / Non Sq Epi: x / Bacteria: x        MICROBIOLOGY:    Female    Culture - Blood (collected 04 Jul 2025 05:52)  Source: Blood None  Preliminary Report:    Growth in anaerobic bottle: Gram Positive Cocci in Clusters    Growth in aerobic bottle: Gram Positive Cocci in Clusters    Culture - Blood (collected 03 Jul 2025 05:48)  Source: Blood None  Final Report:    Growth in aerobic and anaerobic bottles: Staphylococcus aureus    See previous culture [id number]    Culture - Blood (collected 02 Jul 2025 05:42)  Source: Blood None  Final Report:    Growth in aerobic and anaerobic bottles: Staphylococcus aureus    See previous culture [id number]    Culture - Blood (collected 01 Jul 2025 16:20)  Source: Blood Blood-Catheter  Final Report:    Growth in aerobic and anaerobic bottles: Staphylococcus haemolyticus    "Susceptibilities not performed"    Growth in aerobic bottle: Staphylococcus aureus    See previous culture [id number]    Culture - Blood (collected 01 Jul 2025 05:48)  Source: Blood None  Final Report:    Growth in aerobic and anaerobic bottles: Staphylococcus aureus  Organism: Staphylococcus aureus  Organism: Staphylococcus aureus    Sensitivities:      -  Clindamycin: S <=0.25      -  Oxacillin: S 1 Oxacillin predicts susceptibility for dicloxacillin, methicillin, and nafcillin      -  Gentamicin: S <=4 Should not be used as monotherapy      -  Vancomycin: S 1      -  Tetracycline: S <=4      Method Type: AUGUSTINE      -  Penicillin: R >2      -  Rifampin: S <=1 Should not be used as monotherapy      -  Erythromycin: S <=0.25      -  Trimethoprim/Sulfamethoxazole: S <=0.5/9.5    Culture - Blood (collected 30 Jun 2025 05:46)  Source: Blood None  Final Report:    No growth at 5 days    Culture - Blood (collected 29 Jun 2025 05:49)  Source: Blood None  Final Report:    Growth in aerobic and anaerobic bottles: Staphylococcus aureus    See previous culture [id number]    Culture - Blood (collected 29 Jun 2025 05:49)  Source: Blood None  Final Report:    Growth in aerobic and anaerobic bottles: Staphylococcus aureus    See previous culture  [id number]    Urinalysis with Rflx Culture (collected 27 Jun 2025 10:20)    Culture - Urine (collected 27 Jun 2025 10:20)  Source: Clean Catch None  Final Report:    >100,000 CFU/ml Escherichia coli    <10,000 CFU/ml Normal Urogenital pee present  Organism: Escherichia coli  Organism: Escherichia coli    Sensitivities:      -  Levofloxacin: S <=0.5      -  Tobramycin: S <=2      -  Nitrofurantoin: S <=32 Should not be used to treat pyelonephritis      -  Aztreonam: S <=4      -  Gentamicin: S <=2      -  Cefazolin: S 8 For uncomplicated UTI with K. pneumoniae, E. coli, or P. mirablis: AUGUSTINE <=16 is sensitive and AUGUSTINE >=32 is resistant. This also predicts results for oral agents cefaclor, cefdinir, cefpodoxime, cefprozil, cefuroxime axetil, cephalexin and locarbef for uncomplicated UTI. Note that some isolates may be susceptible to these agents while testing resistant to cefazolin.      -  Cefepime: S <=2      -  Piperacillin/Tazobactam: R 32      -  Ciprofloxacin: S <=0.25      -  Imipenem: S <=1      -  Ceftriaxone: S <=1      -  Ampicillin: R >16 These ampicillin results predict results for amoxicillin      Method Type: AUGUSTINE      -  Meropenem: S <=1      -  Ampicillin/Sulbactam: R >16/8      -  Cefoxitin: S <=8      -  Cefuroxime: S 8      -  Amoxicillin/Clavulanic Acid: R >16/8      -  Trimethoprim/Sulfamethoxazole: R >2/38      -  Tigecycline: S <=2 Interpretations based on FDA breakpoints      -  Ertapenem: S <=0.5    D-Dimer Assay, Quantitative: 2085 (07-01)    RADIOLOGY & ADDITIONAL TESTS:  I have personally reviewed the relevant images.   CXR  Xray Chest 1 View AP/PA:   ACC: [id number] EXAM:  XR CHEST 1 VIEW   ORDERED BY: POONAM GERBER     PROCEDURE DATE:  07/06/2025          INTERPRETATION:  CLINICAL HISTORY: Shortness of breath    COMPARISON: Prior day.    TECHNIQUE: Portable frontal chest radiograph.    FINDINGS:    Support devices: Enteric catheter extends below the hemidiaphragm.   Telemetry leads are seen.    Cardiac/mediastinum/hilum: Stable cardiomegaly.    Lung parenchyma/Pleura: Retrocardiac opacification and effusion.    Skeleton/soft tissues: Stable.      IMPRESSION:    Retrocardiac opacification and effusion. Support devices as described.   Stable.    --- End of Report ---            ARMANI PITTS MD; Attending Interventional Radiologist  This document has been electronically signed. Jul 6 2025  7:36AM (07-06-25 @ 07:15)      CT    < from: Xray Chest 1 View AP/PA (07.06.25 @ 07:15) >    TECHNIQUE: Portable frontal chest radiograph.    FINDINGS:    Support devices: Enteric catheter extends below the hemidiaphragm.   Telemetry leads are seen.    Cardiac/mediastinum/hilum: Stable cardiomegaly.    Lung parenchyma/Pleura: Retrocardiac opacification and effusion.    Skeleton/soft tissues: Stable.      IMPRESSION:    Retrocardiac opacification and effusion. Support devices as described.   Stable.    --- End of Report ---    < end of copied text >      WEIGHT  Weight (kg): 58.2 (06-27-25 @ 18:21)      All available historical records have been reviewed

## 2025-07-06 NOTE — SWALLOW BEDSIDE ASSESSMENT ADULT - SWALLOW EVAL: RECOMMENDED DIET
soft + bite sized consistency and thin liquids. consider keeping NGT in order to make sure pt. meets nutritional needs

## 2025-07-07 LAB
ALBUMIN SERPL ELPH-MCNC: 2 G/DL — LOW (ref 3.5–5.2)
ALP SERPL-CCNC: 89 U/L — SIGNIFICANT CHANGE UP (ref 30–115)
ALT FLD-CCNC: <5 U/L — SIGNIFICANT CHANGE UP (ref 0–41)
ANION GAP SERPL CALC-SCNC: 12 MMOL/L — SIGNIFICANT CHANGE UP (ref 7–14)
AST SERPL-CCNC: 8 U/L — SIGNIFICANT CHANGE UP (ref 0–41)
BASOPHILS # BLD AUTO: 0.01 K/UL — SIGNIFICANT CHANGE UP (ref 0–0.2)
BASOPHILS NFR BLD AUTO: 0.2 % — SIGNIFICANT CHANGE UP (ref 0–2)
BILIRUB SERPL-MCNC: 0.5 MG/DL — SIGNIFICANT CHANGE UP (ref 0.2–1.2)
BUN SERPL-MCNC: 13 MG/DL — SIGNIFICANT CHANGE UP (ref 10–20)
CALCIUM SERPL-MCNC: 8.3 MG/DL — LOW (ref 8.4–10.5)
CHLORIDE SERPL-SCNC: 112 MMOL/L — HIGH (ref 98–110)
CO2 SERPL-SCNC: 24 MMOL/L — SIGNIFICANT CHANGE UP (ref 17–32)
CREAT SERPL-MCNC: 0.5 MG/DL — LOW (ref 0.7–1.5)
EGFR: 99 ML/MIN/1.73M2 — SIGNIFICANT CHANGE UP
EGFR: 99 ML/MIN/1.73M2 — SIGNIFICANT CHANGE UP
EOSINOPHIL # BLD AUTO: 0.03 K/UL — SIGNIFICANT CHANGE UP (ref 0–0.5)
EOSINOPHIL NFR BLD AUTO: 0.5 % — SIGNIFICANT CHANGE UP (ref 0–6)
GLUCOSE BLDC GLUCOMTR-MCNC: 93 MG/DL — SIGNIFICANT CHANGE UP (ref 70–99)
GLUCOSE BLDC GLUCOMTR-MCNC: 95 MG/DL — SIGNIFICANT CHANGE UP (ref 70–99)
GLUCOSE SERPL-MCNC: 129 MG/DL — HIGH (ref 70–99)
GRAM STN FLD: ABNORMAL
HCT VFR BLD CALC: 29.2 % — LOW (ref 34.5–45)
HGB BLD-MCNC: 9 G/DL — LOW (ref 11.5–15.5)
IMM GRANULOCYTES # BLD AUTO: 0.03 K/UL — SIGNIFICANT CHANGE UP (ref 0–0.07)
IMM GRANULOCYTES NFR BLD AUTO: 0.5 % — SIGNIFICANT CHANGE UP (ref 0–0.9)
LYMPHOCYTES # BLD AUTO: 0.71 K/UL — LOW (ref 1–3.3)
LYMPHOCYTES NFR BLD AUTO: 11.6 % — LOW (ref 13–44)
MAGNESIUM SERPL-MCNC: 1.9 MG/DL — SIGNIFICANT CHANGE UP (ref 1.8–2.4)
MCHC RBC-ENTMCNC: 29.1 PG — SIGNIFICANT CHANGE UP (ref 27–34)
MCHC RBC-ENTMCNC: 30.8 G/DL — LOW (ref 32–36)
MCV RBC AUTO: 94.5 FL — SIGNIFICANT CHANGE UP (ref 80–100)
MONOCYTES # BLD AUTO: 0.29 K/UL — SIGNIFICANT CHANGE UP (ref 0–0.9)
MONOCYTES NFR BLD AUTO: 4.7 % — SIGNIFICANT CHANGE UP (ref 2–14)
NEUTROPHILS # BLD AUTO: 5.05 K/UL — SIGNIFICANT CHANGE UP (ref 1.8–7.4)
NEUTROPHILS NFR BLD AUTO: 82.5 % — HIGH (ref 43–77)
NRBC # BLD AUTO: 0 K/UL — SIGNIFICANT CHANGE UP (ref 0–0)
NRBC # FLD: 0 K/UL — SIGNIFICANT CHANGE UP (ref 0–0)
NRBC BLD AUTO-RTO: 0 /100 WBCS — SIGNIFICANT CHANGE UP (ref 0–0)
PHOSPHATE SERPL-MCNC: 2.2 MG/DL — SIGNIFICANT CHANGE UP (ref 2.1–4.9)
PLATELET # BLD AUTO: 131 K/UL — LOW (ref 150–400)
PMV BLD: 11.3 FL — SIGNIFICANT CHANGE UP (ref 7–13)
POTASSIUM SERPL-MCNC: 2.7 MMOL/L — CRITICAL LOW (ref 3.5–5)
POTASSIUM SERPL-SCNC: 2.7 MMOL/L — CRITICAL LOW (ref 3.5–5)
PROT SERPL-MCNC: 5.1 G/DL — LOW (ref 6–8)
RBC # BLD: 3.09 M/UL — LOW (ref 3.8–5.2)
RBC # FLD: 16.6 % — HIGH (ref 10.3–14.5)
SODIUM SERPL-SCNC: 148 MMOL/L — HIGH (ref 135–146)
SPECIMEN SOURCE: SIGNIFICANT CHANGE UP
SPECIMEN SOURCE: SIGNIFICANT CHANGE UP
WBC # BLD: 6.12 K/UL — SIGNIFICANT CHANGE UP (ref 3.8–10.5)
WBC # FLD AUTO: 6.12 K/UL — SIGNIFICANT CHANGE UP (ref 3.8–10.5)

## 2025-07-07 PROCEDURE — 99233 SBSQ HOSP IP/OBS HIGH 50: CPT

## 2025-07-07 PROCEDURE — 99497 ADVNCD CARE PLAN 30 MIN: CPT | Mod: 25

## 2025-07-07 PROCEDURE — 99232 SBSQ HOSP IP/OBS MODERATE 35: CPT | Mod: 2W

## 2025-07-07 PROCEDURE — 71045 X-RAY EXAM CHEST 1 VIEW: CPT | Mod: 26

## 2025-07-07 RX ORDER — HYDROMORPHONE/SOD CHLOR,ISO/PF 2 MG/10 ML
2 SYRINGE (ML) INJECTION
Refills: 0 | Status: DISCONTINUED | OUTPATIENT
Start: 2025-07-07 | End: 2025-07-11

## 2025-07-07 RX ORDER — NALOXEGOL OXALATE 12.5 MG/1
12.5 TABLET, FILM COATED ORAL
Refills: 0 | Status: DISCONTINUED | OUTPATIENT
Start: 2025-07-08 | End: 2025-07-11

## 2025-07-07 RX ORDER — NALOXEGOL OXALATE 12.5 MG/1
12.5 TABLET, FILM COATED ORAL ONCE
Refills: 0 | Status: COMPLETED | OUTPATIENT
Start: 2025-07-07 | End: 2025-07-07

## 2025-07-07 RX ORDER — NALOXEGOL OXALATE 12.5 MG/1
TABLET, FILM COATED ORAL
Refills: 0 | Status: DISCONTINUED | OUTPATIENT
Start: 2025-07-07 | End: 2025-07-11

## 2025-07-07 RX ORDER — DILTIAZEM HYDROCHLORIDE 240 MG/1
180 TABLET, EXTENDED RELEASE ORAL DAILY
Refills: 0 | Status: DISCONTINUED | OUTPATIENT
Start: 2025-07-07 | End: 2025-07-07

## 2025-07-07 RX ADMIN — NAFCILLIN 200 GRAM(S): 2 INJECTION, SOLUTION INTRAVENOUS at 02:00

## 2025-07-07 RX ADMIN — Medication 1 APPLICATION(S): at 05:30

## 2025-07-07 RX ADMIN — DILTIAZEM HYDROCHLORIDE 30 MILLIGRAM(S): 240 TABLET, EXTENDED RELEASE ORAL at 05:27

## 2025-07-07 RX ADMIN — MEDPURA VITAMIN A AND D 1 APPLICATION(S): 95 OINTMENT TOPICAL at 05:26

## 2025-07-07 RX ADMIN — Medication 40 MILLIGRAM(S): at 05:26

## 2025-07-07 RX ADMIN — Medication 1 APPLICATION(S): at 17:29

## 2025-07-07 RX ADMIN — Medication 2 MILLIGRAM(S): at 08:25

## 2025-07-07 RX ADMIN — NAFCILLIN 200 GRAM(S): 2 INJECTION, SOLUTION INTRAVENOUS at 05:26

## 2025-07-07 RX ADMIN — Medication 1 APPLICATION(S): at 05:26

## 2025-07-07 RX ADMIN — Medication 2 MILLIGRAM(S): at 16:24

## 2025-07-07 NOTE — PROGRESS NOTE ADULT - SUBJECTIVE AND OBJECTIVE BOX
--------------------------------------------------------------------------------    24 hour events/subjective:  NAD, F/u skin assessment         ROS:  All other systems negative      ALLERGIES & MEDICATIONS  --------------------------------------------------------------------------------  Allergies    No Known Allergies    Intolerances          STANDING INPATIENT MEDICATIONS    bacitracin   Ointment 1 Application(s) Topical two times a day  chlorhexidine 2% Cloths 1 Application(s) Topical <User Schedule>  dextrose 5%. 1000 milliLiter(s) IV Continuous <Continuous>  dextrose 5%. 1000 milliLiter(s) IV Continuous <Continuous>  dextrose 50% Injectable 25 Gram(s) IV Push once  dextrose 50% Injectable 12.5 Gram(s) IV Push once  dextrose 50% Injectable 25 Gram(s) IV Push once  diltiazem    milliGRAM(s) Oral daily  glucagon  Injectable 1 milliGRAM(s) IntraMuscular once  insulin glargine Injectable (LANTUS) 6 Unit(s) SubCutaneous at bedtime  insulin lispro (ADMELOG) corrective regimen sliding scale   SubCutaneous three times a day before meals  insulin lispro Injectable (ADMELOG) 2 Unit(s) SubCutaneous three times a day before meals  naloxegol      naloxegol 12.5 milliGRAM(s) Oral once  pantoprazole    Tablet 40 milliGRAM(s) Oral before breakfast  sertraline 50 milliGRAM(s) Oral daily  tiZANidine 4 milliGRAM(s) Oral at bedtime  vitamin A & D Ointment 1 Application(s) Topical two times a day      PRN INPATIENT MEDICATION  acetaminophen     Tablet .. 650 milliGRAM(s) Oral every 6 hours PRN  dextrose Oral Gel 15 Gram(s) Oral once PRN  HYDROmorphone   Tablet 2 milliGRAM(s) Oral every 3 hours PRN        VITALS/PHYSICAL EXAM-  --------------------------------------------------------------------------------  T(C): 36.6 (07-07-25 @ 04:31), Max: 36.6 (07-07-25 @ 04:31)  HR: 67 (07-07-25 @ 04:31) (67 - 77)  BP: 114/65 (07-07-25 @ 04:31) (110/67 - 117/72)  RR: 18 (07-07-25 @ 04:31) (18 - 18)  SpO2: 93% (07-07-25 @ 04:31) (93% - 94%)  Wt(kg): --        07-06-25 @ 07:01  -  07-07-25 @ 07:00  --------------------------------------------------------  IN: 0 mL / OUT: 1470 mL / NET: -1470 mL            LABS/ CULTURES/ RADIOLOGY:              9.0    6.12  >-----------<  131      [07-07-25 @ 06:26]              29.2     148  |  112  |  13  ----------------------------<  129      [07-07-25 @ 06:26]  2.7   |  24  |  0.5        Ca     8.3     [07-07-25 @ 06:26]      Mg     1.9     [07-07-25 @ 06:26]      Phos  2.2     [07-07-25 @ 06:26]    TPro  5.1  /  Alb  2.0  /  TBili  0.5  /  DBili  x   /  AST  8   /  ALT  <5  /  AlkPhos  89  [07-07-25 @ 06:26]              CAPILLARY BLOOD GLUCOSE      POCT Blood Glucose.: 95 mg/dL (07 Jul 2025 11:52)  POCT Blood Glucose.: 93 mg/dL (07 Jul 2025 07:35)  POCT Blood Glucose.: 220 mg/dL (06 Jul 2025 21:16)  POCT Blood Glucose.: 262 mg/dL (06 Jul 2025 16:30)        Triglycerides, Serum: 240 mg/dL (06-28-25 @ 05:52)            Culture - Blood (collected 07-06-25 @ 07:42)  Source: Blood None  Gram Stain (07-07-25 @ 09:22):    Growth in aerobic bottle: Gram Positive Cocci in Clusters  Preliminary Report (07-07-25 @ 09:22):    Growth in aerobic bottle: Gram Positive Cocci in Clusters    Culture - Blood (collected 07-06-25 @ 07:42)  Source: Blood None  Gram Stain (07-07-25 @ 09:17):    Growth in aerobic bottle: Gram Positive Cocci in Clusters  Preliminary Report (07-07-25 @ 09:17):    Growth in aerobic bottle: Gram Positive Cocci in Clusters    Culture - Blood (collected 07-05-25 @ 07:49)  Source: Blood None  Gram Stain (07-06-25 @ 15:11):    Growth in aerobic bottle: Gram Positive Cocci in Clusters    Growth in anaerobic bottle: Gram Positive Cocci in Clusters  Preliminary Report (07-06-25 @ 15:12):    Growth in aerobic bottle: Gram Positive Cocci in Clusters    Growth in anaerobic bottle: Gram Positive Cocci in Clusters          A1C with Estimated Average Glucose Result: 8.5 % (06-28-25 @ 05:52)

## 2025-07-07 NOTE — CHART NOTE - NSCHARTNOTEFT_GEN_A_CORE
PALLIATIVE MEDICINE INTERDISCIPLINARY TEAM NOTE    Provider:                Family or contact name / phone #     Met with: [  x ] Patient  [ x ] Family  [   ] Other:    Primary Language: [ x ] English [   ] Other*:                      *Interpretation provided by:    SUPPORT DIAGNOSES            (Check all that apply)  [ x ] Spiritual assessment  [   ] EOL issues  [   ] Cultural / spiritual concerns  [   ] Pain / suffering  [   ] Dementia / AMS  [   ] Other:  [   ] AD issues  [   ] Grief / loss / sadness  [   ] Discharge issues  [   ] Distress / coping    SPIRITUAL ASSESSMENT    [ x ] Initial Assessment            [   ] Reassessment          [   ] Not Applicable this visit    Pain/suffering acuity:  [   ] None to mild (0-3)           [ x ] Moderate (4-6)        [   ] High (7-10)    Coping:  [   ] Coping well                     [   ] Coping w/difficulty            [ x  ] Poor coping    Support system:  [   ] Strong                              [ x ] Adequate                        [   ] Inadequate    Adventism/Spiritual practice: __Catholic_________________________    Role of organized Confucianist:  [   ] Important                     [   ] Some (fam tradition, cultural)               [ x ] None    Effects on medical care:  [   ] Yes, _____________________________________                         [ x ] None    Cultural/Religion need:  [   ] Yes, _____________________________________                         [ x ] None    Refer to Pastoral Care:  [   ] Yes           [ x ] No, not at this time    SERVICE PROVIDED  [   ]PSSA                                                                             [   ]Discharge support / facilitation  [   ]AD / goals of care counseling                                  [   ]EOL / death / bereavement counseling  [   ]Counseling / support                                                [   ] Family meeting  [   ]Prayer / sacrament / ritual                                      [   ] Referral   [ x  ]Other                                                                       NOTE and Plan of Care (PoC): This was a telehealth visit with Dr. Ernandez. Pt did not communicate much. Pt  were at bedside and spoke on behalf of Pt. Their goals are clear. I was a supportive pastoral presence. I will follow up for support.

## 2025-07-07 NOTE — PROGRESS NOTE ADULT - ASSESSMENT
This is a 80-year-old female with past medical history of hypertension hyperlipidemia DVT being treated for pancreatic cancer on chemotherapy with chest port brought to ed for evaluation of the weakness.    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    Covid/Flu/RSV negative   MRSA nares negative     RECOMMENDATIONS  -Urine cultures noted for E.coli (Sensitive to Cefazolin)  -TTE noted. Tricuspid valve thickening. Can not rule out vegetation.  -Port explanted 7/2/2025-Pus noted in the cavity   -Patient has opted for CMO, can be transitioned to PO Dicloxacillin 1 gram 6 hours for 6 weeks, but unlikely to cure the infection.   -Local wound care for the sacral injury. Avoid constipation.  -Off loading to prevent pressure sores and preventive measures to avoid aspiration. GOC. TOV when able.    If any questions, please send a message or call on [email or web address] Teams  Please continue to update ID with any pertinent new laboratory or radiographic findings.    Amado Irwin M.D  Infectious Diseases Attending/   [employer] at [facility]

## 2025-07-07 NOTE — PROGRESS NOTE ADULT - SUBJECTIVE AND OBJECTIVE BOX
LENGTH OF HOSPITAL STAY: 10d    CHIEF COMPLAINT:    Patient is a 74y old  Female who presents with a chief complaint of weakness (06 Jul 2025 08:27)    OVERNIGHT EVENTS:    - tele reviewed by me NSR 75 no events  - no overnight events  - pt examined at bedside, family at bedside, discussed plan and answered questions  - tolerating PO, having BMs w/ dignishield, making urine freeman    FOLLOW UP:    - CMO dispo hospice     HOSPITAL COURSE:    ICU Course:  - patient was found to have MSSA bacteremia, abx narrowed to Nafcillin; Chemo port removed 7/2 with pus noted in the cavity  - course c/b tachycardia, cardiology evaluated patient found to have premature atrial complexes; TTE showed thickening of tricuspid valve; vetgation not ruled out  - GOC discussed with patient's ; pt is DNR/DNI. Given patient's overall poor prognosis there is strong consideration to treat empirically and avoid JOSE, however if pt continues to clinically improve JOSE can be considered.  - for thrombocytopenia, patient was transfused 1 plt, AC restarted 7/3, Heme Onc was consulted  - 7/5 received 1u pRBC  - 7/6 auto-dc'd NGT    HPI:       80-year-old female with past medical history of hypertension hyperlipidemia DVT on Eliquis recently being treated for pancreatic cancer on chemotherapy with chest port brought to ed for evaluation of the weakness. Hx taken from the patient  at bedside. States that for the past few days, patient has been very weak, dehydrated, not eating and drinking well, unable to walk and unable to take care of her self. Also reported that patient has been more sleepy recently and had a fall a few back.  said she normally has some dementia however the last 3 days, Patient was recently becoming more demented prior to the fall.  Patient complaining of diffuse body pain afterwards  (27 Jun 2025 15:17)      ALLERGIES:    No Known Allergies    MEDICATIONS:  STANDING MEDICATIONS  bacitracin   Ointment 1 Application(s) Topical two times a day  chlorhexidine 2% Cloths 1 Application(s) Topical <User Schedule>  dextrose 5%. 1000 milliLiter(s) IV Continuous <Continuous>  dextrose 5%. 1000 milliLiter(s) IV Continuous <Continuous>  dextrose 50% Injectable 25 Gram(s) IV Push once  dextrose 50% Injectable 12.5 Gram(s) IV Push once  dextrose 50% Injectable 25 Gram(s) IV Push once  diltiazem    milliGRAM(s) Oral daily  glucagon  Injectable 1 milliGRAM(s) IntraMuscular once  insulin glargine Injectable (LANTUS) 6 Unit(s) SubCutaneous at bedtime  insulin lispro (ADMELOG) corrective regimen sliding scale   SubCutaneous three times a day before meals  insulin lispro Injectable (ADMELOG) 2 Unit(s) SubCutaneous three times a day before meals  naloxegol      pantoprazole    Tablet 40 milliGRAM(s) Oral before breakfast  sertraline 50 milliGRAM(s) Oral daily  tiZANidine 4 milliGRAM(s) Oral at bedtime  vitamin A & D Ointment 1 Application(s) Topical two times a day    PRN MEDICATIONS  acetaminophen     Tablet .. 650 milliGRAM(s) Oral every 6 hours PRN  dextrose Oral Gel 15 Gram(s) Oral once PRN  HYDROmorphone   Tablet 2 milliGRAM(s) Oral every 3 hours PRN      LABS:                        9.0    6.12  )-----------( 131      ( 07 Jul 2025 06:26 )             29.2     07-07    148[H]  |  112[H]  |  13  ----------------------------<  129[H]  2.7[LL]   |  24  |  0.5[L]    Ca    8.3[L]      07 Jul 2025 06:26  Phos  2.2     07-07  Mg     1.9     07-07    TPro  5.1[L]  /  Alb  2.0[L]  /  TBili  0.5  /  DBili  x   /  AST  8   /  ALT  <5  /  AlkPhos  89  07-07      Urinalysis Basic - ( 07 Jul 2025 06:26 )    Color: x / Appearance: x / SG: x / pH: x  Gluc: 129 mg/dL / Ketone: x  / Bili: x / Urobili: x   Blood: x / Protein: x / Nitrite: x   Leuk Esterase: x / RBC: x / WBC x   Sq Epi: x / Non Sq Epi: x / Bacteria: x            Culture - Blood (collected 06 Jul 2025 07:42)  Source: Blood None  Gram Stain (07 Jul 2025 14:49):    Growth in aerobic bottle: Gram Positive Cocci in Clusters    Growth in anaerobic bottle: Gram Positive Cocci in Clusters  Preliminary Report (07 Jul 2025 14:49):    Growth in aerobic bottle: Gram Positive Cocci in Clusters    Growth in anaerobic bottle: Gram Positive Cocci in Clusters    Culture - Blood (collected 06 Jul 2025 07:42)  Source: Blood None  Gram Stain (07 Jul 2025 09:17):    Growth in aerobic bottle: Gram Positive Cocci in Clusters  Preliminary Report (07 Jul 2025 09:17):    Growth in aerobic bottle: Gram Positive Cocci in Clusters    Culture - Blood (collected 05 Jul 2025 07:49)  Source: Blood None  Gram Stain (06 Jul 2025 15:11):    Growth in aerobic bottle: Gram Positive Cocci in Clusters    Growth in anaerobic bottle: Gram Positive Cocci in Clusters  Preliminary Report (07 Jul 2025 14:14):    Growth in aerobic and anaerobic bottles: Staphylococcus aureus      VITALS:   T(F): 97.9  HR: 67  BP: 114/65  RR: 18  SpO2: 93%        PHYSICAL EXAM:    Gen: NAD, resting in bed; thin/frail/ill appearing  HEENT: Normocephalic, atraumatic; ngt  Neck: supple, no lymphadenopathy  CV: Regular rate & regular rhythm; chest wall clean/dressed former port site  Lungs: decreased BS at bases, no fremitus  Abdomen: Soft, BS present, non tender, scaphoid  Ext: Warm, well perfused  Neuro: moves all extremities against resistance, no droop, awake  Skin: no rash, no erythema

## 2025-07-07 NOTE — PROGRESS NOTE ADULT - SUBJECTIVE AND OBJECTIVE BOX
DONNAARMANIGINALINDA TODD  74y, Female    LOS  10d    INTERVAL EVENTS/HPI  - No acute events overnight  - T(F): , Max: 97.9 (07-07-25 @ 04:31)  - WBC Count: 6.12 (07-07-25 @ 06:26)  WBC Count: 6.60 (07-06-25 @ 07:42)  - Creatinine: 0.5 (07-07-25 @ 06:26)  Creatinine: 0.5 (07-06-25 @ 07:42)    REVIEW OF SYSTEMS:  CONSTITUTIONAL: No fever or chills  HEENT: No sore throat  RESPIRATORY: No cough, no shortness of breath  CARDIOVASCULAR: No chest pain or palpitations  GASTROINTESTINAL: No abdominal or epigastric pain  GENITOURINARY: No dysuria  NEUROLOGICAL: No headache/dizziness  MSK: No joint pain, erythema, or swelling; no back pain  SKIN: No itching, rashes  All other ROS negative except noted above    Prior hospital charts reviewed [Yes]  Primary team notes reviewed [Yes]  Other consultant notes reviewed [Yes]    ANTIMICROBIALS:       OTHER MEDS: MEDICATIONS  (STANDING):  acetaminophen     Tablet .. 650 every 6 hours PRN  dextrose 50% Injectable 25 once  dextrose 50% Injectable 12.5 once  dextrose 50% Injectable 25 once  dextrose Oral Gel 15 once PRN  glucagon  Injectable 1 once  HYDROmorphone   Tablet 2 every 3 hours PRN  insulin glargine Injectable (LANTUS) 6 at bedtime  insulin lispro (ADMELOG) corrective regimen sliding scale  three times a day before meals  insulin lispro Injectable (ADMELOG) 2 three times a day before meals  naloxegol    naloxegol 12.5 once  pantoprazole    Tablet 40 before breakfast  sertraline 50 daily  tiZANidine 4 at bedtime      Vital Signs Last 24 Hrs  T(F): 97.9 (07-07-25 @ 04:31), Max: 98.3 (07-04-25 @ 20:22)    Vital Signs Last 24 Hrs  HR: 67 (07-07-25 @ 04:31) (67 - 77)  BP: 114/65 (07-07-25 @ 04:31) (110/67 - 117/72)  RR: 18 (07-07-25 @ 04:31)  SpO2: 93% (07-07-25 @ 04:31) (93% - 94%)  Wt(kg): --    EXAM:  GENERAL: In NAD, On NC  NECK: Supple  CHEST/LUNG: Shallow breath sounds.   HEART: S1 S2  ABDOMEN: Soft, nontender  EXTREMITIES: No clubbing  NERVOUS SYSTEM: Awake.   MSK: No joint erythema, swelling or pain    Labs:                        9.0    6.12  )-----------( 131      ( 07 Jul 2025 06:26 )             29.2     07-07    148[H]  |  112[H]  |  13  ----------------------------<  129[H]  2.7[LL]   |  24  |  0.5[L]    Ca    8.3[L]      07 Jul 2025 06:26  Phos  2.2     07-07  Mg     1.9     07-07    TPro  5.1[L]  /  Alb  2.0[L]  /  TBili  0.5  /  DBili  x   /  AST  8   /  ALT  <5  /  AlkPhos  89  07-07      WBC Trend:  WBC Count: 6.12 (07-07-25 @ 06:26)  WBC Count: 6.60 (07-06-25 @ 07:42)  WBC Count: 6.15 (07-05-25 @ 19:06)  WBC Count: 5.89 (07-05-25 @ 07:49)      Creatine Trend:  Creatinine: 0.5 (07-07)  Creatinine: 0.5 (07-06)  Creatinine: 0.6 (07-05)  Creatinine: 0.6 (07-04)      Liver Biochemical Testing Trend:  Alanine Aminotransferase (ALT/SGPT): <5 (07-07)  Alanine Aminotransferase (ALT/SGPT): <5 (07-06)  Alanine Aminotransferase (ALT/SGPT): <5 (07-05)  Alanine Aminotransferase (ALT/SGPT): <5 (07-04)  Alanine Aminotransferase (ALT/SGPT): <5 (07-03)  Aspartate Aminotransferase (AST/SGOT): 8 (07-07-25 @ 06:26)  Aspartate Aminotransferase (AST/SGOT): 8 (07-06-25 @ 07:42)  Aspartate Aminotransferase (AST/SGOT): 10 (07-05-25 @ 07:49)  Aspartate Aminotransferase (AST/SGOT): 10 (07-04-25 @ 05:52)  Aspartate Aminotransferase (AST/SGOT): 13 (07-03-25 @ 05:48)  Bilirubin Total: 0.5 (07-07)  Bilirubin Total: 0.5 (07-06)  Bilirubin Total: 0.5 (07-05)  Bilirubin Total: 0.5 (07-04)  Bilirubin Total: 0.6 (07-03)      Trend LDH      Urinalysis Basic - ( 07 Jul 2025 06:26 )    Color: x / Appearance: x / SG: x / pH: x  Gluc: 129 mg/dL / Ketone: x  / Bili: x / Urobili: x   Blood: x / Protein: x / Nitrite: x   Leuk Esterase: x / RBC: x / WBC x   Sq Epi: x / Non Sq Epi: x / Bacteria: x        MICROBIOLOGY:    Female    Culture - Blood (collected 05 Jul 2025 07:49)  Source: Blood None  Preliminary Report:    Growth in aerobic bottle: Gram Positive Cocci in Clusters    Growth in anaerobic bottle: Gram Positive Cocci in Clusters    Culture - Blood (collected 04 Jul 2025 05:52)  Source: Blood None  Final Report:    Growth in aerobic and anaerobic bottles: Staphylococcus aureus    See previous culture [id number]    Culture - Blood (collected 03 Jul 2025 05:48)  Source: Blood None  Final Report:    Growth in aerobic and anaerobic bottles: Staphylococcus aureus    See previous culture [id number]    Culture - Blood (collected 02 Jul 2025 05:42)  Source: Blood None  Final Report:    Growth in aerobic and anaerobic bottles: Staphylococcus aureus    See previous culture [id number]    Culture - Blood (collected 01 Jul 2025 16:20)  Source: Blood Blood-Catheter  Final Report:    Growth in aerobic and anaerobic bottles: Staphylococcus haemolyticus    "Susceptibilities not performed"    Growth in aerobic bottle: Staphylococcus aureus    See previous culture [id number]    Culture - Blood (collected 01 Jul 2025 05:48)  Source: Blood None  Final Report:    Growth in aerobic and anaerobic bottles: Staphylococcus aureus  Organism: Staphylococcus aureus  Organism: Staphylococcus aureus    Sensitivities:      -  Clindamycin: S <=0.25      -  Oxacillin: S 1 Oxacillin predicts susceptibility for dicloxacillin, methicillin, and nafcillin      -  Gentamicin: S <=4 Should not be used as monotherapy      -  Vancomycin: S 1      -  Tetracycline: S <=4      Method Type: AUGUSTINE      -  Penicillin: R >2      -  Rifampin: S <=1 Should not be used as monotherapy      -  Erythromycin: S <=0.25      -  Trimethoprim/Sulfamethoxazole: S <=0.5/9.5    Culture - Blood (collected 30 Jun 2025 05:46)  Source: Blood None  Final Report:    No growth at 5 days    Culture - Blood (collected 29 Jun 2025 05:49)  Source: Blood None  Final Report:    Growth in aerobic and anaerobic bottles: Staphylococcus aureus    See previous culture [id number]    Culture - Blood (collected 29 Jun 2025 05:49)  Source: Blood None  Final Report:    Growth in aerobic and anaerobic bottles: Staphylococcus aureus    See previous culture  [id number]    Urinalysis with Rflx Culture (collected 27 Jun 2025 10:20)    D-Dimer Assay, Quantitative: 2085 (07-01)    RADIOLOGY & ADDITIONAL TESTS:  I have personally reviewed the relevant images.   CXR  Xray Chest 1 View AP/PA:   ACC: [id number] EXAM:  XR CHEST 1 VIEW   ORDERED BY: POONAM GERBER     PROCEDURE DATE:  07/07/2025          INTERPRETATION:  CLINICAL HISTORY: Shortness of breath    COMPARISON: July 6, 2025    TECHNIQUE: Portable frontal chest radiograph.    FINDINGS:    Support devices: None.    Cardiac/mediastinum/hilum: Heart size within normal limits, thoracic   < from: Xray Chest 1 View AP/PA (07.07.25 @ 07:29) >    INTERPRETATION:  CLINICAL HISTORY: Shortness of breath    COMPARISON: July 6, 2025    TECHNIQUE: Portable frontal chest radiograph.    FINDINGS:    Support devices: None.    Cardiac/mediastinum/hilum: Heart size within normal limits, thoracic   aortic calcification..    Lung parenchyma/Pleura: Left opacity, left basilar focal   atelectasis/pleural effusion.    Skeleton/soft tissues: Stable.      IMPRESSION:    Redemonstration left opacity, left basilar focal atelectasis/pleural   effusion.    --- End of Report ---    < end of copied text >  aortic calcification..    Lung parenchyma/Pleura: Left opacity, left basilar focal   atelectasis/pleural effusion.    Skeleton/soft tissues: Stable.      IMPRESSION:    Redemonstration left opacity, left basilar focal atelectasis/pleural   effusion.    --- End of Report ---            RENEE MEEK MD; Attending Radiologist  This document has been electronically signed. Jul 7 2025  8:09AM (07-07-25 @ 07:29)      CT        WEIGHT  Weight (kg): 58.2 (06-27-25 @ 18:21)  Creatinine: 0.5 mg/dL (07-07-25 @ 06:26)      All available historical records have been reviewed

## 2025-07-07 NOTE — PROGRESS NOTE ADULT - SUBJECTIVE AND OBJECTIVE BOX
HPI:   80-year-old female with past medical history of hypertension hyperlipidemia DVT on Eliquis recently being treated for pancreatic cancer on chemotherapy with chest port brought to ed for evaluation of the weakness. Hx taken from the patient  at bedside. States that for the past few days, patient has been very weak, dehydrated, not eating and drinking well, unable to walk and unable to take care of her self. Also reported that patient has been more sleepy recently and had a fall a few back.  said she normally has some dementia however the last 3 days, Patient was recently becoming more demented prior to the fall.  Patient complaining of diffuse body pain afterwards  (27 Jun 2025 15:17)    Interval history:  -patient at bedside with  via telehealth  -denied symptoms  -patient/family notes they want hospice.    ITEMS NOT CHECKED ARE NOT PRESENT    ADVANCE DIRECTIVES:    [ ] Full Code [x ] DNR  MOLST  [ ]  Living Will  [ ]   DECISION MAKER(s):  [ ] Health Care Proxy(s)  [ x] Surrogate(s)  [ ] Guardian           Name(s): Phone Number(s):      BASELINE (I)ADL(s) (prior to admission):  Iron: [ ]Total  [ ] Moderate [ ]Dependent      Allergies    No Known Allergies    Intolerances    MEDICATIONS  (STANDING):  bacitracin   Ointment 1 Application(s) Topical two times a day  chlorhexidine 2% Cloths 1 Application(s) Topical <User Schedule>  dextrose 5%. 1000 milliLiter(s) (50 mL/Hr) IV Continuous <Continuous>  dextrose 5%. 1000 milliLiter(s) (100 mL/Hr) IV Continuous <Continuous>  dextrose 50% Injectable 25 Gram(s) IV Push once  dextrose 50% Injectable 12.5 Gram(s) IV Push once  dextrose 50% Injectable 25 Gram(s) IV Push once  diltiazem    milliGRAM(s) Oral daily  glucagon  Injectable 1 milliGRAM(s) IntraMuscular once  insulin glargine Injectable (LANTUS) 6 Unit(s) SubCutaneous at bedtime  insulin lispro (ADMELOG) corrective regimen sliding scale   SubCutaneous three times a day before meals  insulin lispro Injectable (ADMELOG) 2 Unit(s) SubCutaneous three times a day before meals  naloxegol      pantoprazole    Tablet 40 milliGRAM(s) Oral before breakfast  sertraline 50 milliGRAM(s) Oral daily  tiZANidine 4 milliGRAM(s) Oral at bedtime  vitamin A & D Ointment 1 Application(s) Topical two times a day    MEDICATIONS  (PRN):  acetaminophen     Tablet .. 650 milliGRAM(s) Oral every 6 hours PRN Temp greater or equal to 38C (100.4F), Mild Pain (1 - 3)  dextrose Oral Gel 15 Gram(s) Oral once PRN Blood Glucose LESS THAN 70 milliGRAM(s)/deciliter  HYDROmorphone   Tablet 2 milliGRAM(s) Oral every 3 hours PRN Pain      PRESENT SYMPTOMS: [ ]Unable to obtain due to poor mentation   Source if other than patient:  [ ]Family   [ ]Team     Pain: [ ]yes [ x]no  QOL impact -   Location -                    Aggravating factors -  Alleviating factors -   Quality -  Radiation -  Timing -   Severity (0-10 scale):  Minimal acceptable level (0-10 scale):     CPOT:    https://www.[facility].org/getattachment/yeh01x41-8l6x-[email or web address]-[email or web address]/Critical-Care-Pain-Observation-Tool-(CPOT)    PAIN AD Score:   http://geriatrictoolkit.[facility]/cog/painad.pdf     Dyspnea:                           [ x]None[ ]Mild [ ]Moderate [ ]Severe     Respiratory Distress Observation Scale (RDOS):   A score of 0 to 2 signifies little or no respiratory distress, 3 signifies mild distress, scores 4 to 6 indicate moderate distress, and scores greater than 7 signify severe distress  https://www.[facility].ca/sites/default/files/PDFS/365447-flcrpljjbay-xzququpk-rjmnzsknxgx-suanu.pdf    Anxiety:                             [ ]None[ ]Mild [ ]Moderate [ ]Severe   Fatigue:                             [ ]None[ ]Mild [ ]Moderate [ ]Severe   Nausea:                             [ ]None[ ]Mild [ ]Moderate [ ]Severe   Loss of appetite:              [ ]None[ ]Mild [ ]Moderate [ ]Severe   Constipation:                    [ ]None[ ]Mild [ ]Moderate [ ]Severe    Other Symptoms:  [x ]All other review of systems negative     PHYSICAL EXAM:    GENERAL:  NAD   PULMONARY:  Non labored breathing  NEUROLOGIC: AAOx2-3  BEHAVIORAL/PSYCH:  Calm    Palliative Performance Status Version 2:      30   %    http://[facility].org/files/news/palliative_performance_scale_ppsv2.pdf    LABS: I have reviewed daily labs, including                          9.0    6.12  )-----------( 131      ( 07 Jul 2025 06:26 )             29.2       07-07    148[H]  |  112[H]  |  13  ----------------------------<  129[H]  2.7[LL]   |  24  |  0.5[L]    Ca    8.3[L]      07 Jul 2025 06:26  Phos  2.2     07-07  Mg     1.9     07-07    TPro  5.1[L]  /  Alb  2.0[L]  /  TBili  0.5  /  DBili  x   /  AST  8   /  ALT  <5  /  AlkPhos  89  07-07              Urinalysis Basic - ( 07 Jul 2025 06:26 )    Color: x / Appearance: x / SG: x / pH: x  Gluc: 129 mg/dL / Ketone: x  / Bili: x / Urobili: x   Blood: x / Protein: x / Nitrite: x   Leuk Esterase: x / RBC: x / WBC x   Sq Epi: x / Non Sq Epi: x / Bacteria: x                  CAPILLARY BLOOD GLUCOSE      POCT Blood Glucose.: 95 mg/dL (07 Jul 2025 11:52)        Xray Chest 1 View AP/PA:   ACC: [id number] EXAM:  XR CHEST 1 VIEW   ORDERED BY: POONAM GERBER     PROCEDURE DATE:  07/07/2025          INTERPRETATION:  CLINICAL HISTORY: Shortness of breath    COMPARISON: July 6, 2025    TECHNIQUE: Portable frontal chest radiograph.    FINDINGS:    Support devices: None.    Cardiac/mediastinum/hilum: Heart size within normal limits, thoracic   aortic calcification..    Lung parenchyma/Pleura: Left opacity, left basilar focal   atelectasis/pleural effusion.    Skeleton/soft tissues: Stable.      IMPRESSION:    Redemonstration left opacity, left basilar focal atelectasis/pleural   effusion.    --- End of Report ---            RENEE MEEK MD; Attending Radiologist  This document has been electronically signed. Jul 7 2025  8:09AM (07-07-25 @ 07:29)        RADIOLOGY & ADDITIONAL STUDIES: I have independently reviewed new imaging,     PROTEIN CALORIE MALNUTRITION PRESENT: [ ]mild [ ]moderate [ ]severe [ ]underweight [ ]morbid obesity  https://www.andeal.org/vault/2440/web/files/ONC/Table_Clinical%20Characteristics%20to%20Document%20Malnutrition-White%20JV%20et%20al%202012.pdf    Height (cm): 172.7 (06-27-25 @ 18:21)  Weight (kg): 58.2 (06-27-25 @ 18:21)  BMI (kg/m2): 19.5 (06-27-25 @ 18:21)    [ ]PPSV2 < or = to 30% [ ]significant weight loss  [ ]poor nutritional intake  [ ]anasarca      [ ]Artificial Nutrition      Palliative Care Spiritual/Emotional Screening Tool Question  Severity (0-4):                    OR                    [ x] Unable to determine. Will assess at later time if appropriate.  Score of 2 or greater indicates recommendation of Chaplaincy and/or SW referral  Chaplaincy Referral: [ ] Yes [ ] Refused [ ] Following     Caregiver [place]:  [ ] Yes [ ] No    OR    [x ] Unable to determine. Will assess at later time if appropriate.  Social Work Referral [ ]  Patient and Family Centered Care Referral [ ]    Anticipatory Grief Present: [ ] Yes [ ] No    OR     [ x] Unable to determine. Will assess at later time if appropriate.  Social Work Referral [ ]  Patient and Family Centered Care Referral [ ]    REFERRALS:   [ ]Chaplaincy  [ ]Hospice  [ ]Child Life  [ ]Social Work  [ ]Case management [ ]Holistic Therapy     Palliative care education provided to patient and/or family

## 2025-07-07 NOTE — PROGRESS NOTE ADULT - ASSESSMENT
80-year-old female with past medical history of hypertension hyperlipidemia DVT on Eliquis recently being treated for pancreatic cancer on chemotherapy with chest port brought to ed for evaluation of the weakness. Patient found to have bacteremia. Palliative care consulted to assist with GOC.     Patient made comfort measures only this morning and hospice referral placed. Spoke with patient and  at bedside. Patient denied pain at time of visit.  Patient's  denied pain.  Will follow    Plan:  -DNR/DNI  -Comfort measures only per primary team discussion with patient/family  -hospice referral placed  -continue dilaudid 2mg PO q3h PRN for pain  -recommend adding ativan 0.5mg q4h PRN for anxiety/agitation  -will follow      Palliative care will continue to follow.   Please call [phone] with questions or concerns 24/7.

## 2025-07-07 NOTE — PROGRESS NOTE ADULT - NS ATTEND AMEND GEN_ALL_CORE FT
Patient seen and examined.  Agree with above.   Tele remains SR with PAC's   Pt. with possible vegetation on TTE and staph bacteremia   Would ideally check JOSE when medically optimized and if within GOC (pt. currently DNR/DNI)  Obtain GOC   Follow up ID   Further workup pending above    Charlene Mills MD
Patient seen and examined.  Agree with above.   - Tele remains SR with pac's   - Pt. with gram positive bacteremia and tte with possible vegetation vs thickening of TV  - Although ideally would check JOSE, patient with poor functional status, has drop in Hb, is DNR/DNI, and after speaking with the , the patient did not want aggressive or invasive cardiac workup.   - Given above, will hold off on JOSE or invasive cardiac workup   - Follow up ID     Charlene Mills MD
Patient seen and examined. Pertinent labs, imaging and telemetry reviewed. I agree with the above:     Patient now wishes to not undergo further invasive procedures.   -Family at bedside confirms.   -They understand risks and benefits.   -Will need to discuss with ID antibiotic planning without JOSE guidance.

## 2025-07-07 NOTE — PROGRESS NOTE ADULT - CONVERSATION DETAILS
Had discussion with family and pt;    Pt has decided that returning home and prioritizing comfort over any further medical intervention, workup, or management is paramount goal, family in agreement.     Provided support and answered questions.

## 2025-07-07 NOTE — PROGRESS NOTE ADULT - ASSESSMENT
End of life care  CMO  palliative appreciated  hospice consult  dilaudid po / ativan po  dc vitals, meds, labs    Pancreatic CA on chemo via left sided Chest wall port  MSSA bacteremia- Source likely chemoport  Multifocal Pneumonia / acute hypoxemic resp failure   Pulmonary Nodules  - Bcx noted, MSSA, still persistently positive, repeat till cleared  - BCx 7/4 positive 7/5 pending 7/6 pending 7/7 ordered  - s/p chemoport removal  - TTe cant r/o vegetation on tricuspid. will need JOSE   - pan scan for other sources of infection  - id appreciated  stop tx and w/u per GOC    dysphagia  - pureed diet for now, can remove NGT if able to take half of meals by mouth, Na noted, D5+LR+20KCL    Thrombocytopenia   Acute anemia  - Hbg dropped 7/5, no evidence of bleeding, give 1 u prbc, hold dvt ppx, off eliquis  - Plt noted, has been low, 2/2 infection, active Ca and chemo   -heme/onc appreciated very poor prognosis    Fall with Sacral bruising    CKD 3 pre renal pete  - freeman, TOV  after PT eval, PT/ OT eval,       Transaminitis    Afib RVR   sinus now holding ac    HO HTN    HO DVT       - GOC: CMO

## 2025-07-07 NOTE — PROGRESS NOTE ADULT - SUBJECTIVE AND OBJECTIVE BOX
DATE OF SERVICE: 07-07-25    Patient denies chest pain or shortness of breath.   Review of symptoms otherwise negative.    acetaminophen     Tablet .. 650 milliGRAM(s) Oral every 6 hours PRN  bacitracin   Ointment 1 Application(s) Topical two times a day  chlorhexidine 2% Cloths 1 Application(s) Topical <User Schedule>  dextrose 5%. 1000 milliLiter(s) IV Continuous <Continuous>  dextrose 5%. 1000 milliLiter(s) IV Continuous <Continuous>  dextrose 50% Injectable 25 Gram(s) IV Push once  dextrose 50% Injectable 12.5 Gram(s) IV Push once  dextrose 50% Injectable 25 Gram(s) IV Push once  dextrose Oral Gel 15 Gram(s) Oral once PRN  diltiazem    milliGRAM(s) Oral daily  glucagon  Injectable 1 milliGRAM(s) IntraMuscular once  HYDROmorphone   Tablet 2 milliGRAM(s) Oral every 3 hours PRN  insulin glargine Injectable (LANTUS) 6 Unit(s) SubCutaneous at bedtime  insulin lispro (ADMELOG) corrective regimen sliding scale   SubCutaneous three times a day before meals  insulin lispro Injectable (ADMELOG) 2 Unit(s) SubCutaneous three times a day before meals  naloxegol      pantoprazole    Tablet 40 milliGRAM(s) Oral before breakfast  sertraline 50 milliGRAM(s) Oral daily  tiZANidine 4 milliGRAM(s) Oral at bedtime  vitamin A & D Ointment 1 Application(s) Topical two times a day                            9.0    6.12  )-----------( 131      ( 07 Jul 2025 06:26 )             29.2       Hemoglobin: 9.0 g/dL (07-07 @ 06:26)  Hemoglobin: 8.5 g/dL (07-06 @ 07:42)  Hemoglobin: 8.6 g/dL (07-05 @ 19:06)  Hemoglobin: 6.9 g/dL (07-05 @ 07:49)  Hemoglobin: 6.8 g/dL (07-04 @ 16:05)      07-07    148[H]  |  112[H]  |  13  ----------------------------<  129[H]  2.7[LL]   |  24  |  0.5[L]    Ca    8.3[L]      07 Jul 2025 06:26  Phos  2.2     07-07  Mg     1.9     07-07    TPro  5.1[L]  /  Alb  2.0[L]  /  TBili  0.5  /  DBili  x   /  AST  8   /  ALT  <5  /  AlkPhos  89  07-07    Creatinine Trend: 0.5<--, 0.5<--, 0.6<--, 0.6<--, 0.6<--, 0.7<--    COAGS:           T(C): 36.6 (07-07-25 @ 04:31), Max: 36.6 (07-07-25 @ 04:31)  HR: 67 (07-07-25 @ 04:31) (67 - 77)  BP: 114/65 (07-07-25 @ 04:31) (110/67 - 117/72)  RR: 18 (07-07-25 @ 04:31) (18 - 18)  SpO2: 93% (07-07-25 @ 04:31) (93% - 94%)  Wt(kg): --    I&O's Summary    06 Jul 2025 07:01  -  07 Jul 2025 07:00  --------------------------------------------------------  IN: 0 mL / OUT: 1470 mL / NET: -1470 mL    PHYSICAL EXAM:  GENERAL:  73y/o Female NAD  HEAD:  Atraumatic, Normocephalic  EYES: EOMI, PERRLA, conjunctiva and sclera clear  NECK: Supple, No JVD, no cervical lymphadenopathy, non-tender  CHEST/LUNG: Clear to auscultation bilaterally; No wheeze, rhonchi, or rales  HEART: Regular rate and rhythm; S1&S2  ABDOMEN: Soft, Nontender, Nondistended x 4 quadrants; Bowel sounds present  EXTREMITIES:   Peripheral Pulses Present, No clubbing, no cyanosis, or no edema, no calf tenderness  PSYCH: AAOx3, cooperative, appropriate  NEUROLOGY: WNL  SKIN: WNL    TTE Echo Complete w/o Contrast w/ Doppler (06.30.25 @ 11:35) >  CONCLUSIONS:     1. Left ventricular systolic function is normal with an ejection   fraction visually estimated at 55 to 60 %.   2. The left ventricular diastolic function is indeterminate.   3. Normal right ventricular cavity size, with normal wall thickness, and   normal right ventricular systolic function. Tricuspid annular tissue   Doppler S' is 25.3 cm/s (normal >10 cm/s).   4. Normal left and right atrial size.   5. Aortic valve was not well visualized.   6. There is mildcalcification of the mitral valve annulus.   7. Trace mitral regurgitation at a blood pressure of 118/57 mmHg.   8. Possible thickening of tricuspid valve. Cannot rule out vegetation.   9. Aortic root at the sinuses of Valsalva is dilated, measuring 3.80 cm   (indexed 2.25 cm/m²).  10. Estimated pulmonary artery systolic pressure is 29 mmHg, consistent   with normal pulmonary artery pressure.  11. Small pericardial effusion noted adjacent to the anterior right   ventricle with no echocardiographic evidence of tamponade physiology.    < end of copied text >    < from: 12 Lead ECG (06.28.25 @ 16:41) >  Diagnosis Line Sinus tachycardia with premature atrial complexes  Undetermined rhythm  Low voltage QRS  Incomplete right bundle branch block  Left anterior fascicular block  Possible Anterolateral infarct , age undetermined  Abnormal ECG    < end of copied text >      Assessment and Recommendations:  80-year-old female with past medical history of hypertension hyperlipidemia DVT on Eliquis recently being treated for pancreatic cancer on chemotherapy with chest port brought to ed for evaluation of the weakness.    OUTPATIENT CARDIOLOGIST:  Beatrice johnson Patient lethargic, in NAD    IMPRESSION:  #MSSA bacteremia- Unclear Source possible chemoport  #Multifocal Pneumonia   #Thrombocytopenia s/p platelets  #Pancreatic CA on chemo via left sided Chest wall port/ possible surgical removal today  #AFib c RVR?  Telemetry appears to be sinus w/ PACs  EKG: Sinus tachycardia with premature atrial complexes  TTE 6/30/2025: Normal LVSF, EF 55-60%, Small pericardial effusion noted adjacent to the anterior right ventricle with no echocardiographic evidence of tamponade physiology. Possible thickening of tricuspid valve. Cannot rule out vegetation.    PLAN:  Pt currently DNR/DNI.   Had discussion regarding JOSE- As per Pt's spouse at bedside, they would prefer conservative medical management- they are refusing any further testing/ procedures  Recommend GOC/ palliative care/  for discharge planning    ***All recommendations are preliminary until co-signed by an attending***

## 2025-07-07 NOTE — PROGRESS NOTE ADULT - ASSESSMENT
High risk for pressure injury development or progression                            B/L extremity diffused skin tears with ecchymosis       Wound #1  Type and location :  Deep tissue pressure injury to sacrococcygeal (present on admission )  Size: ~6x6  Tissue Description : Intact dark red skin tissue   No odor, erythema, increased warmth, tenderness, induration, fluctuance, nor crepitus   Wound Exudate : None    Wound Edge: Intact   Periwound Condition : Macerated     Wound #2  Type and location :  Deep tissue pressure injury to L heel ( present on admission )    Size: ~2x2  Tissue Description : Intact dark red skin tissue   No odor, erythema, increased warmth, tenderness, induration, fluctuance, nor crepitus   Wound Exudate : None    Wound Edge: Intact   Periwound Condition : Macerated                 Wound and skin care recs.   Continue current treatment    Clean wound with soap and water, pat dry apply triad hydrophilic dressing - monitor for  progression   Apply xeroform with Kerlix dressing to skin tear   Pressure  injury  preventive  measures  Skin  and incontinence care   Assess wound and inform primary provider of any changes   Case discussed with primary Rn  Wound/ ostomy specialist  to f/u as needed     Offloading: [x ] Frequent position changes [ ] Devices/Equipment  Cleansing: [ ] Saline [ x] Soap/Water [ ] Other: ______  Topicals: [x ] Barrier Cream [ ] Antimicrobial [ ] Enzymatic Wound Debridement [] Moist  wound Healing    Dressings: [ ] Dry, sterile [ ] Allevyn  Foam [ ] Absorbant Pads [ ] Collagenase    Other Recs.   Per Primary team   Total time for bedside assessment  , review of medical records  and  discussion of plan of care with primary team greater than 35 min

## 2025-07-08 ENCOUNTER — TRANSCRIPTION ENCOUNTER (OUTPATIENT)
Age: 75
End: 2025-07-08

## 2025-07-08 LAB
-  CLINDAMYCIN: SIGNIFICANT CHANGE UP
-  ERYTHROMYCIN: SIGNIFICANT CHANGE UP
-  GENTAMICIN: SIGNIFICANT CHANGE UP
-  OXACILLIN: SIGNIFICANT CHANGE UP
-  PENICILLIN: SIGNIFICANT CHANGE UP
-  RIFAMPIN: SIGNIFICANT CHANGE UP
-  TETRACYCLINE: SIGNIFICANT CHANGE UP
-  TRIMETHOPRIM/SULFAMETHOXAZOLE: SIGNIFICANT CHANGE UP
-  VANCOMYCIN: SIGNIFICANT CHANGE UP
CULTURE RESULTS: ABNORMAL
GLUCOSE BLDC GLUCOMTR-MCNC: 183 MG/DL — HIGH (ref 70–99)
GRAM STN FLD: ABNORMAL
GRAM STN FLD: ABNORMAL
METHOD TYPE: SIGNIFICANT CHANGE UP
ORGANISM # SPEC MICROSCOPIC CNT: ABNORMAL
ORGANISM # SPEC MICROSCOPIC CNT: SIGNIFICANT CHANGE UP
SPECIMEN SOURCE: SIGNIFICANT CHANGE UP

## 2025-07-08 PROCEDURE — 99239 HOSP IP/OBS DSCHRG MGMT >30: CPT

## 2025-07-08 PROCEDURE — 99233 SBSQ HOSP IP/OBS HIGH 50: CPT

## 2025-07-08 RX ORDER — CEFAZOLIN SODIUM IN 0.9 % NACL 3 G/100 ML
1000 INTRAVENOUS SOLUTION, PIGGYBACK (ML) INTRAVENOUS EVERY 8 HOURS
Refills: 0 | Status: DISCONTINUED | OUTPATIENT
Start: 2025-07-09 | End: 2025-07-09

## 2025-07-08 RX ORDER — CEFAZOLIN SODIUM IN 0.9 % NACL 3 G/100 ML
INTRAVENOUS SOLUTION, PIGGYBACK (ML) INTRAVENOUS
Refills: 0 | Status: DISCONTINUED | OUTPATIENT
Start: 2025-07-08 | End: 2025-07-09

## 2025-07-08 RX ORDER — CEFAZOLIN SODIUM IN 0.9 % NACL 3 G/100 ML
1000 INTRAVENOUS SOLUTION, PIGGYBACK (ML) INTRAVENOUS ONCE
Refills: 0 | Status: COMPLETED | OUTPATIENT
Start: 2025-07-08 | End: 2025-07-08

## 2025-07-08 RX ADMIN — SERTRALINE 50 MILLIGRAM(S): 100 TABLET, FILM COATED ORAL at 13:29

## 2025-07-08 RX ADMIN — TIZANIDINE 4 MILLIGRAM(S): 4 TABLET ORAL at 21:12

## 2025-07-08 RX ADMIN — Medication 1 APPLICATION(S): at 05:24

## 2025-07-08 RX ADMIN — Medication 2 MILLIGRAM(S): at 00:06

## 2025-07-08 RX ADMIN — Medication 2 MILLIGRAM(S): at 08:02

## 2025-07-08 RX ADMIN — MEDPURA VITAMIN A AND D 1 APPLICATION(S): 95 OINTMENT TOPICAL at 05:26

## 2025-07-08 RX ADMIN — INSULIN GLARGINE-YFGN 6 UNIT(S): 100 INJECTION, SOLUTION SUBCUTANEOUS at 21:11

## 2025-07-08 RX ADMIN — Medication 2 MILLIGRAM(S): at 20:11

## 2025-07-08 RX ADMIN — Medication 2 MILLIGRAM(S): at 21:41

## 2025-07-08 RX ADMIN — Medication 2 MILLIGRAM(S): at 16:48

## 2025-07-08 RX ADMIN — Medication 2 MILLIGRAM(S): at 00:36

## 2025-07-08 RX ADMIN — Medication 100 MILLIGRAM(S): at 22:23

## 2025-07-08 RX ADMIN — NALOXEGOL OXALATE 12.5 MILLIGRAM(S): 12.5 TABLET, FILM COATED ORAL at 11:14

## 2025-07-08 RX ADMIN — Medication 1 APPLICATION(S): at 05:25

## 2025-07-08 RX ADMIN — Medication 1 APPLICATION(S): at 21:15

## 2025-07-08 RX ADMIN — Medication 2 MILLIGRAM(S): at 13:27

## 2025-07-08 RX ADMIN — MEDPURA VITAMIN A AND D 1 APPLICATION(S): 95 OINTMENT TOPICAL at 21:15

## 2025-07-08 RX ADMIN — Medication 2 MILLIGRAM(S): at 07:52

## 2025-07-08 RX ADMIN — Medication 2 MILLIGRAM(S): at 12:14

## 2025-07-08 NOTE — CHART NOTE - NSCHARTNOTEFT_GEN_A_CORE
Patient, with  at bedside, wishes to resume antibiotics. MOLST form modified to allow this. Depending on ability to start IV, will resume antibiotics as listed in ID note Patient, with  at bedside, wishes to resume antibiotics. MOLST form modified to allow this. Based on last ID note, will start Cefazolin

## 2025-07-08 NOTE — DISCHARGE NOTE PROVIDER - NSDCMRMEDTOKEN_GEN_ALL_CORE_FT
Claritin 10 mg oral tablet: 1 tab(s) orally once a day  Creon 24,000 units oral delayed release capsule: 1 cap(s) orally 3 times a day  docusate: 3 tab(s) orally once a day  Eliquis 5 mg oral tablet: 1 tab(s) orally 2 times a day  glimepiride 2 mg oral tablet: 1 tab(s) orally once a day  HYDROmorphone 4 mg oral tablet: 1 tab(s) orally every 6 hours as needed for  severe pain  lansoprazole 30 mg oral delayed release capsule: 1 cap(s) orally once a day  morphine 30 mg oral tablet: 1 tab(s) orally 3 times a day as needed for  moderate pain  sertraline 50 mg oral tablet: 1 tab(s) orally once a day  tiZANidine 4 mg oral capsule: 2 cap(s) orally once a day (at bedtime)   cephalexin 500 mg oral capsule: 2 cap(s) orally every 8 hours  docusate: 3 tab(s) orally once a day  HYDROmorphone 2 mg oral tablet: 1 tab(s) orally every 3 hours as needed for Pain MDD: 16 mg  sertraline 50 mg oral tablet: 1 tab(s) orally once a day  tiZANidine 4 mg oral capsule: 2 cap(s) orally once a day (at bedtime)   cephalexin 500 mg oral capsule: 2 cap(s) orally every 8 hours  docusate: 3 tab(s) orally once a day  HYDROmorphone 2 mg oral tablet: 1 tab(s) orally every 3 hours as needed for Pain MDD: 16 mg  MS Contin 15 mg oral tablet, extended release: 1 tab(s) orally 2 times a day MDD: 30 Mg  sertraline 50 mg oral tablet: 1 tab(s) orally once a day  tiZANidine 4 mg oral capsule: 2 cap(s) orally once a day (at bedtime)

## 2025-07-08 NOTE — DISCHARGE NOTE PROVIDER - HOSPITAL COURSE
80-year-old female with past medical history of hypertension hyperlipidemia DVT on Eliquis recently being treated for pancreatic cancer on chemotherapy with chest port brought to ed for evaluation of the weakness. Hx taken from the patient  at bedside. States that for the past few days, patient has been very weak, dehydrated, not eating and drinking well, unable to walk and unable to take care of her self. Also reported that patient has been more sleepy recently and had a fall a few back.  said she normally has some dementia however the last 3 days, Patient was recently becoming more demented prior to the fall.  Patient complaining of diffuse body pain afterwards     Had discussion about PO abx - pt can go home on po abx if preffered however from non clearing cultures it is likely that there is an occult abscess/infection somewhere that we haven't identified and w/o source control PO abx will likely not improve sxs, however left the option open for pt and family;    ICU Course:  - patient was found to have MSSA bacteremia, abx narrowed to Nafcillin; Chemo port removed 7/2 with pus noted in the cavity  - course c/b tachycardia, cardiology evaluated patient found to have premature atrial complexes; TTE showed thickening of tricuspid valve; vetgation not ruled out  - GOC discussed with patient's ; pt is DNR/DNI. Given patient's overall poor prognosis there is strong consideration to treat empirically and avoid HEBERT, however if pt continues to clinically improve HEBERT can be considered.  - for thrombocytopenia, patient was transfused 1 plt, AC restarted 7/3, Heme Onc was consulted    Tele Course  - 7/5 received 1u pRBC  - 7/6 auto-dc'd NGT  - after discussion w/ ID and family ordered CT pan scan w/ con and pushed for heebrt... however pt at that point decided that she had "been through enough" and that she wanted to spend her remaining time "at home with my flowers"; after a long discussion w/ pt and  at bedside pt was made CMO and consult placed to hospice.  Pt's  reached out to [facility] team to see if they had resources as well.  At this point pt and  highly motivated to leave hospital and return home to prioritize pt comfort w/ her remaining time.      Details of medical management prior to being made CMO:      Pancreatic CA on chemo via left sided Chest wall port  MSSA bacteremia- Source likely chemoport  Multifocal Pneumonia / acute hypoxemic resp failure   Pulmonary Nodules  - Bcx noted, MSSA, still persistently positive, repeat till cleared  - BCx 7/4 positive 7/5 pending 7/6 pending 7/7 ordered  - s/p chemoport removal  - TTe cant r/o vegetation on tricuspid. will need HEBERT   - pan scan for other sources of infection  - id appreciated    dysphagia  - pureed diet for now, can remove NGT if able to take half of meals by mouth, Na noted, D5+LR+20KCL    Thrombocytopenia   Acute anemia  - Hbg dropped 7/5, no evidence of bleeding, give 1 u prbc, hold dvt ppx, off eliquis  - Plt noted, has been low, 2/2 infection, active Ca and chemo   -heme/onc appreciated very poor prognosis    Fall with Sacral bruising; unstageable sacral ulcer    CKD 3 pre renal pete  - freeman, TOV  after PT eval, PT/ OT eval,       Transaminitis    Afib RVR   sinus now holding ac    HO HTN    HO DVT

## 2025-07-08 NOTE — PROGRESS NOTE ADULT - ASSESSMENT
80-year-old female with past medical history of hypertension hyperlipidemia DVT on Eliquis recently being treated for pancreatic cancer on chemotherapy with chest port brought to ed for evaluation of the weakness. Patient found to have bacteremia. Palliative care consulted to assist with GOC.     Patient seen at bedside with . Notes pain controlled on current PRN regimen. Patient was previously on MS Contin. Per our discussion, will hold off on long acting opioids for now, but will consider them if patient has sustained PRN use.    Plan:  -DNR/DNI  -Comfort measures only per primary team discussion with patient/family  -hospice referral placed  -continue dilaudid 2mg PO q3h PRN for pain  -recommend adding ativan 0.5mg q4h PRN for anxiety/agitation  -will follow      Palliative care will continue to follow.   Please call [phone] with questions or concerns 24/7.

## 2025-07-08 NOTE — DISCHARGE NOTE PROVIDER - DETAILS OF MALNUTRITION DIAGNOSIS/DIAGNOSES
This patient has been assessed with a concern for Malnutrition and was treated during this hospitalization for the following Nutrition diagnosis/diagnoses:     -  06/28/2025: Severe protein-calorie malnutrition

## 2025-07-08 NOTE — HOSPICE CARE NOTE - CONVESATION DETAILS
PC placed to pt's  Claudy, hospice education provided, Claudy requesting time to speak with medical team, hospice contact number provided.

## 2025-07-08 NOTE — PROGRESS NOTE ADULT - SUBJECTIVE AND OBJECTIVE BOX
HPI:   80-year-old female with past medical history of hypertension hyperlipidemia DVT on Eliquis recently being treated for pancreatic cancer on chemotherapy with chest port brought to ed for evaluation of the weakness. Hx taken from the patient  at bedside. States that for the past few days, patient has been very weak, dehydrated, not eating and drinking well, unable to walk and unable to take care of her self. Also reported that patient has been more sleepy recently and had a fall a few back.  said she normally has some dementia however the last 3 days, Patient was recently becoming more demented prior to the fall.  Patient complaining of diffuse body pain afterwards  (27 Jun 2025 15:17)    Interval history:  -patient at bedside with   -she noted pain well controlled today    ITEMS NOT CHECKED ARE NOT PRESENT    ADVANCE DIRECTIVES:    [ ] Full Code [x ] DNR  MOLST  [ ]  Living Will  [ ]   DECISION MAKER(s):  [ ] Health Care Proxy(s)  [ x] Surrogate(s)  [ ] Guardian           Name(s): Phone Number(s):      BASELINE (I)ADL(s) (prior to admission):  [place]: [ ]Total  [ ] Moderate [ ]Dependent      Allergies    No Known Allergies    Intolerances    MEDICATIONS  (STANDING):  bacitracin   Ointment 1 Application(s) Topical two times a day  chlorhexidine 2% Cloths 1 Application(s) Topical <User Schedule>  dextrose 5%. 1000 milliLiter(s) (50 mL/Hr) IV Continuous <Continuous>  dextrose 5%. 1000 milliLiter(s) (100 mL/Hr) IV Continuous <Continuous>  dextrose 50% Injectable 25 Gram(s) IV Push once  dextrose 50% Injectable 12.5 Gram(s) IV Push once  dextrose 50% Injectable 25 Gram(s) IV Push once  glucagon  Injectable 1 milliGRAM(s) IntraMuscular once  insulin glargine Injectable (LANTUS) 6 Unit(s) SubCutaneous at bedtime  insulin lispro (ADMELOG) corrective regimen sliding scale   SubCutaneous three times a day before meals  insulin lispro Injectable (ADMELOG) 2 Unit(s) SubCutaneous three times a day before meals  naloxegol      naloxegol 12.5 milliGRAM(s) Oral before breakfast  pantoprazole    Tablet 40 milliGRAM(s) Oral before breakfast  sertraline 50 milliGRAM(s) Oral daily  tiZANidine 4 milliGRAM(s) Oral at bedtime  vitamin A & D Ointment 1 Application(s) Topical two times a day    MEDICATIONS  (PRN):  acetaminophen     Tablet .. 650 milliGRAM(s) Oral every 6 hours PRN Temp greater or equal to 38C (100.4F), Mild Pain (1 - 3)  dextrose Oral Gel 15 Gram(s) Oral once PRN Blood Glucose LESS THAN 70 milliGRAM(s)/deciliter  HYDROmorphone   Tablet 2 milliGRAM(s) Oral every 3 hours PRN Pain      PRESENT SYMPTOMS: [ ]Unable to obtain due to poor mentation   Source if other than patient:  [ ]Family   [ ]Team     Pain: [ ]yes [ x]no  QOL impact -   Location -                    Aggravating factors -  Alleviating factors -   Quality -  Radiation -  Timing -   Severity (0-10 scale):  Minimal acceptable level (0-10 scale):     CPOT:    https://[email or web address]/Critical-Care-Pain-Observation-Tool-(CPOT)    PAIN AD Score:   http://geriatrictoolkit.[email or web address]/painad.pdf     Dyspnea:                           [ x]None[ ]Mild [ ]Moderate [ ]Severe     Respiratory Distress Observation Scale (RDOS):   A score of 0 to 2 signifies little or no respiratory distress, 3 signifies mild distress, scores 4 to 6 indicate moderate distress, and scores greater than 7 signify severe distress  [email or web address]/sites/default/files/PDFS/557004-ukjodzgpqcz-euvhhmap-frystgodequ-heskk.pdf    Anxiety:                             [ ]None[ ]Mild [ ]Moderate [ ]Severe   Fatigue:                             [ ]None[ ]Mild [ ]Moderate [ ]Severe   Nausea:                             [ ]None[ ]Mild [ ]Moderate [ ]Severe   Loss of appetite:              [ ]None[ ]Mild [ ]Moderate [ ]Severe   Constipation:                    [ ]None[ ]Mild [ ]Moderate [ ]Severe    Other Symptoms:  [x ]All other review of systems negative     PHYSICAL EXAM:    GENERAL:  NAD   PULMONARY:  Non labored breathing  NEUROLOGIC: AAOx3  BEHAVIORAL/PSYCH:  Calm    Palliative Performance Status Version 2:      30   %    http://[facility].org/files/news/palliative_performance_scale_ppsv2.pdf    LABS: I have reviewed daily labs, including                          9.0    6.12  )-----------( 131      ( 07 Jul 2025 06:26 )             29.2       07-07    148[H]  |  112[H]  |  13  ----------------------------<  129[H]  2.7[LL]   |  24  |  0.5[L]    Ca    8.3[L]      07 Jul 2025 06:26  Phos  2.2     07-07  Mg     1.9     07-07    TPro  5.1[L]  /  Alb  2.0[L]  /  TBili  0.5  /  DBili  x   /  AST  8   /  ALT  <5  /  AlkPhos  89  07-07              Urinalysis Basic - ( 07 Jul 2025 06:26 )    Color: x / Appearance: x / SG: x / pH: x  Gluc: 129 mg/dL / Ketone: x  / Bili: x / Urobili: x   Blood: x / Protein: x / Nitrite: x   Leuk Esterase: x / RBC: x / WBC x   Sq Epi: x / Non Sq Epi: x / Bacteria: x                  CAPILLARY BLOOD GLUCOSE      POCT Blood Glucose.: 95 mg/dL (07 Jul 2025 11:52)        Xray Chest 1 View AP/PA:   ACC: [id number] EXAM:  XR CHEST 1 VIEW   ORDERED BY: POONAM GERBER     PROCEDURE DATE:  07/07/2025          INTERPRETATION:  CLINICAL HISTORY: Shortness of breath    COMPARISON: July 6, 2025    TECHNIQUE: Portable frontal chest radiograph.    FINDINGS:    Support devices: None.    Cardiac/mediastinum/hilum: Heart size within normal limits, thoracic   aortic calcification..    Lung parenchyma/Pleura: Left opacity, left basilar focal   atelectasis/pleural effusion.    Skeleton/soft tissues: Stable.      IMPRESSION:    Redemonstration left opacity, left basilar focal atelectasis/pleural   effusion.    --- End of Report ---            RENEE MEEK MD; Attending Radiologist  This document has been electronically signed. Jul 7 2025  8:09AM (07-07-25 @ 07:29)        RADIOLOGY & ADDITIONAL STUDIES: I have independently reviewed new imaging,     PROTEIN CALORIE MALNUTRITION PRESENT: [ ]mild [ ]moderate [ ]severe [ ]underweight [ ]morbid obesity  https://www.[facility].org/vault/2440/web/files/ONC/Table_Clinical%20Characteristics%20to%20Document%20Malnutrition-White%20JV%20et%20al%202012.pdf    Height (cm): 172.7 (06-27-25 @ 18:21)  Weight (kg): 58.2 (06-27-25 @ 18:21)  BMI (kg/m2): 19.5 (06-27-25 @ 18:21)    [ ]PPSV2 < or = to 30% [ ]significant weight loss  [ ]poor nutritional intake  [ ]anasarca      [ ]Artificial Nutrition      Palliative Care Spiritual/Emotional Screening Tool Question  Severity (0-4):                    OR                    [ x] Unable to determine. Will assess at later time if appropriate.  Score of 2 or greater indicates recommendation of Chaplaincy and/or SW referral  Chaplaincy Referral: [ ] Yes [ ] Refused [ ] Following     Caregiver [place]:  [ ] Yes [ ] No    OR    [x ] Unable to determine. Will assess at later time if appropriate.  Social Work Referral [ ]  Patient and Family Centered Care Referral [ ]    Anticipatory Grief Present: [ ] Yes [ ] No    OR     [ x] Unable to determine. Will assess at later time if appropriate.  Social Work Referral [ ]  Patient and Family Centered Care Referral [ ]    REFERRALS:   [ ]Chaplaincy  [ ]Hospice  [ ]Child Life  [ ]Social Work  [ ]Case management [ ]Holistic Therapy     Palliative care education provided to patient and/or family

## 2025-07-08 NOTE — PROGRESS NOTE ADULT - ASSESSMENT
This is a 80-year-old female with past medical history of hypertension hyperlipidemia DVT being treated for pancreatic cancer on chemotherapy with chest port brought to ed for evaluation of the weakness.    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    Covid/Flu/RSV negative   MRSA nares negative     RECOMMENDATIONS  -Urine cultures noted for E.coli (Sensitive to Cefazolin)  -TTE noted. Tricuspid valve thickening. Can not rule out vegetation.  -Port explanted 7/2/2025-Pus noted in the cavity   -Patient has opted for CMO, can be transitioned to PO Dicloxacillin 1 gram 6 hours for 6 weeks, but unlikely to cure the infection.   -Local wound care for the sacral ulcer. Can use santyl and cover it with Allevyn and clean with vashe. Avoid constipation.  -Off loading to prevent pressure sores and preventive measures to avoid aspiration. GOC. TOV if appropriate.     If any questions, please send a message or call on [employer] ??? Teams  Please continue to update ID with any pertinent new laboratory or radiographic findings.    Amado Irwin M.D  Infectious Diseases Attending/   [employer] at [facility]   This is a 80-year-old female with past medical history of hypertension hyperlipidemia DVT being treated for pancreatic cancer on chemotherapy with chest port brought to ed for evaluation of the weakness.    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    Covid/Flu/RSV negative   MRSA nares negative     RECOMMENDATIONS  -Urine cultures noted for E.coli (Sensitive to Cefazolin)  -TTE noted. Tricuspid valve thickening. Can not rule out vegetation.  -Port explanted 7/2/2025-Pus noted in the cavity   -Patient has opted for CMO, can be transitioned to PO Dicloxacillin 1 gram 6 hours for 6 weeks, but unlikely to cure the infection.   -Local wound care for the sacral ulcer. Can use santyl and cover it with Allevyn and clean with vashe. Avoid constipation.  -Off loading to prevent pressure sores and preventive measures to avoid aspiration. GOC. TOV if appropriate. Grave prognosis.    If any questions, please send a message or call on [employer] Teams  Please continue to update ID with any pertinent new laboratory or radiographic findings.    Amado Irwin M.D  Infectious Diseases Attending/   [employer] at [facility]

## 2025-07-08 NOTE — PROGRESS NOTE ADULT - SUBJECTIVE AND OBJECTIVE BOX
LINDA ONEAL  74y, Female    LOS  11d    INTERVAL EVENTS/HPI  - No acute events overnight  - WBC Count: 6.12 (07-07-25 @ 06:26)  WBC Count: 6.60 (07-06-25 @ 07:42)  - Creatinine: 0.5 (07-07-25 @ 06:26)    REVIEW OF SYSTEMS:  CONSTITUTIONAL: No fever or chills  HEENT: No sore throat  RESPIRATORY: No cough, no shortness of breath  CARDIOVASCULAR: No chest pain or palpitations  GASTROINTESTINAL: No abdominal or epigastric pain  GENITOURINARY: No dysuria  NEUROLOGICAL: No headache/dizziness  MSK: No joint pain, erythema, or swelling; no back pain  SKIN: No itching, rashes  All other ROS negative except noted above    Prior hospital charts reviewed [Yes]  Primary team notes reviewed [Yes]  Other consultant notes reviewed [Yes]    ANTIMICROBIALS:       OTHER MEDS: MEDICATIONS  (STANDING):  acetaminophen     Tablet .. 650 every 6 hours PRN  dextrose 50% Injectable 25 once  dextrose 50% Injectable 12.5 once  dextrose 50% Injectable 25 once  dextrose Oral Gel 15 once PRN  glucagon  Injectable 1 once  HYDROmorphone   Tablet 2 every 3 hours PRN  insulin glargine Injectable (LANTUS) 6 at bedtime  insulin lispro (ADMELOG) corrective regimen sliding scale  three times a day before meals  insulin lispro Injectable (ADMELOG) 2 three times a day before meals  naloxegol    naloxegol 12.5 before breakfast  pantoprazole    Tablet 40 before breakfast  sertraline 50 daily  tiZANidine 4 at bedtime      Vital Signs Last 24 Hrs  T(F): 97.9 (07-07-25 @ 04:31), Max: 98.3 (07-04-25 @ 20:22)    Vital Signs Last 24 Hrs  HR: --  BP: --  RR: --  SpO2: --  Wt(kg): --    EXAM:  GENERAL: In NAD  NECK: Supple  CHEST/LUNG: Shallow breath sounds.   HEART: S1 S2  ABDOMEN: Soft, nontender  EXTREMITIES: No clubbing  NERVOUS SYSTEM: Awake.   MSK: No joint erythema, swelling or pain  BACK: unstageable sacral ulcer     Labs:                        9.0    6.12  )-----------( 131      ( 07 Jul 2025 06:26 )             29.2     07-07    148[H]  |  112[H]  |  13  ----------------------------<  129[H]  2.7[LL]   |  24  |  0.5[L]    Ca    8.3[L]      07 Jul 2025 06:26  Phos  2.2     07-07  Mg     1.9     07-07    TPro  5.1[L]  /  Alb  2.0[L]  /  TBili  0.5  /  DBili  x   /  AST  8   /  ALT  <5  /  AlkPhos  89  07-07      WBC Trend:  WBC Count: 6.12 (07-07-25 @ 06:26)  WBC Count: 6.60 (07-06-25 @ 07:42)  WBC Count: 6.15 (07-05-25 @ 19:06)  WBC Count: 5.89 (07-05-25 @ 07:49)      Creatine Trend:  Creatinine: 0.5 (07-07)  Creatinine: 0.5 (07-06)  Creatinine: 0.6 (07-05)  Creatinine: 0.6 (07-04)      Liver Biochemical Testing Trend:  Alanine Aminotransferase (ALT/SGPT): <5 (07-07)  Alanine Aminotransferase (ALT/SGPT): <5 (07-06)  Alanine Aminotransferase (ALT/SGPT): <5 (07-05)  Alanine Aminotransferase (ALT/SGPT): <5 (07-04)  Alanine Aminotransferase (ALT/SGPT): <5 (07-03)  Aspartate Aminotransferase (AST/SGOT): 8 (07-07-25 @ 06:26)  Aspartate Aminotransferase (AST/SGOT): 8 (07-06-25 @ 07:42)  Aspartate Aminotransferase (AST/SGOT): 10 (07-05-25 @ 07:49)  Aspartate Aminotransferase (AST/SGOT): 10 (07-04-25 @ 05:52)  Aspartate Aminotransferase (AST/SGOT): 13 (07-03-25 @ 05:48)  Bilirubin Total: 0.5 (07-07)  Bilirubin Total: 0.5 (07-06)  Bilirubin Total: 0.5 (07-05)  Bilirubin Total: 0.5 (07-04)  Bilirubin Total: 0.6 (07-03)      Trend LDH      Urinalysis Basic - ( 07 Jul 2025 06:26 )    Color: x / Appearance: x / SG: x / pH: x  Gluc: 129 mg/dL / Ketone: x  / Bili: x / Urobili: x   Blood: x / Protein: x / Nitrite: x   Leuk Esterase: x / RBC: x / WBC x   Sq Epi: x / Non Sq Epi: x / Bacteria: x        MICROBIOLOGY:    Female    Culture - Blood (collected 06 Jul 2025 07:42)  Source: Blood None  Preliminary Report:    Growth in aerobic bottle: Gram Positive Cocci in Clusters    Growth in anaerobic bottle: Gram Positive Cocci in Clusters    Culture - Blood (collected 06 Jul 2025 07:42)  Source: Blood None  Preliminary Report:    Growth in aerobic bottle: Gram Positive Cocci in Clusters    Culture - Blood (collected 05 Jul 2025 07:49)  Source: Blood None  Final Report:    Growth in aerobic and anaerobic bottles: Staphylococcus aureus  Organism: Staphylococcus aureus  Organism: Staphylococcus aureus    Sensitivities:      -  Clindamycin: S <=0.25      -  Oxacillin: S 1 Oxacillin predicts susceptibility for dicloxacillin, methicillin, and nafcillin      -  Gentamicin: S <=4 Should not be used as monotherapy      -  Vancomycin: S 1      -  Tetracycline: S <=4      Method Type: AUGUSTINE      -  Penicillin: R >2      -  Rifampin: S <=1 Should not be used as monotherapy      -  Erythromycin: S <=0.25      -  Trimethoprim/Sulfamethoxazole: S <=0.5/9.5    Culture - Blood (collected 04 Jul 2025 05:52)  Source: Blood None  Final Report:    Growth in aerobic and anaerobic bottles: Staphylococcus aureus    See previous culture [id number]    Culture - Blood (collected 03 Jul 2025 05:48)  Source: Blood None  Final Report:    Growth in aerobic and anaerobic bottles: Staphylococcus aureus    See previous culture [id number]    Culture - Blood (collected 02 Jul 2025 05:42)  Source: Blood None  Final Report:    Growth in aerobic and anaerobic bottles: Staphylococcus aureus    See previous culture [id number]    Culture - Blood (collected 01 Jul 2025 16:20)  Source: Blood Blood-Catheter  Final Report:    Growth in aerobic and anaerobic bottles: Staphylococcus haemolyticus    "Susceptibilities not performed"    Growth in aerobic bottle: Staphylococcus aureus    See previous culture [id number]    Culture - Blood (collected 01 Jul 2025 05:48)  Source: Blood None  Final Report:    Growth in aerobic and anaerobic bottles: Staphylococcus aureus  Organism: Staphylococcus aureus  Organism: Staphylococcus aureus    Sensitivities:      -  Clindamycin: S <=0.25      -  Oxacillin: S 1 Oxacillin predicts susceptibility for dicloxacillin, methicillin, and nafcillin      -  Gentamicin: S <=4 Should not be used as monotherapy      -  Vancomycin: S 1      -  Tetracycline: S <=4      Method Type: AUGUSTINE      -  Penicillin: R >2      -  Rifampin: S <=1 Should not be used as monotherapy      -  Erythromycin: S <=0.25      -  Trimethoprim/Sulfamethoxazole: S <=0.5/9.5    Culture - Blood (collected 30 Jun 2025 05:46)  Source: Blood None  Final Report:    No growth at 5 days    Culture - Blood (collected 29 Jun 2025 05:49)  Source: Blood None  Final Report:    Growth in aerobic and anaerobic bottles: Staphylococcus aureus    See previous culture [id number]    RADIOLOGY & ADDITIONAL TESTS:  I have personally reviewed the relevant images.   CXR  Xray Chest 1 View AP/PA:   ACC: [id number] EXAM:  XR CHEST 1 VIEW   ORDERED BY: POONAM GERBER     PROCEDURE DATE:  07/07/2025          INTERPRETATION:  CLINICAL HISTORY: Shortness of breath    COMPARISON: July 6, 2025    TECHNIQUE: Portable frontal chest radiograph.    FINDINGS:    Support devices: None.    Cardiac/mediastinum/hilum: Heart size within normal limits, thoracic   aortic calcification..    Lung parenchyma/Pleura: Left opacity, left basilar focal   atelectasis/pleural effusion.    Skeleton/soft tissues: Stable.      IMPRESSION:    Redemonstration left opacity, left basilar focal atelectasis/pleural   effusion.    --- End of Report ---            RENEE MEEK MD; Attending Radiologist  This document has been electronically signed. Jul 7 2025  8:09AM (07-07-25 @ 07:29)      CT        WEIGHT  Weight (kg): 58.2 (06-27-25 @ 18:21)      All available historical records have been reviewed

## 2025-07-08 NOTE — CHART NOTE - NSCHARTNOTEFT_GEN_A_CORE
Called by Office Max for Blood cx     7/6 - staph aureus  7/7 GPC In clusters    Pt is CMO and currently not on any antibx per GOC

## 2025-07-08 NOTE — DISCHARGE NOTE PROVIDER - NSDCCPCAREPLAN_GEN_ALL_CORE_FT
PRINCIPAL DISCHARGE DIAGNOSIS  Diagnosis: Sepsis due to pneumonia  Assessment and Plan of Treatment: You presented with fever and lethargy and were found to be septic from an infected chemo port.  You were treated in the ICU and evaluated by our infection team and crit care team. Once stable you were downgraded to our telemetry unit.  In light of the invasive nature of the remaining tests to be completed and the futility in the face of the prognosis of the underlying pancreatic cancer you decided to prioritize comfort and dignity over invasive medical testing and management.  Consults were placed to hospice and you will be going home with hospice care to spend your time with your family at your home.      SECONDARY DISCHARGE DIAGNOSES  Diagnosis: AMS (altered mental status)  Assessment and Plan of Treatment:

## 2025-07-08 NOTE — DISCHARGE NOTE PROVIDER - ATTENDING DISCHARGE PHYSICAL EXAMINATION:
Gen: NAD, resting in bed; thin/frail/ill appearing  HEENT: Normocephalic, atraumatic; ngt  Neck: supple, no lymphadenopathy  CV: Regular rate & regular rhythm; chest wall clean/dressed former port site  Lungs: decreased BS at bases, no fremitus  Abdomen: Soft, BS present, non tender, scaphoid  Ext: Warm, well perfused  Neuro: moves all extremities against resistance, no droop, awake  Skin: no rash, no erythema         Gen: NAD, resting in bed; thin/frail/ill appearing  HEENT: Normocephalic, atraumatic  Neck: supple, no lymphadenopathy  CV: Regular rate & regular rhythm  Lungs: decreased BS at bases, no fremitus  Abdomen: Soft, BS present, non tender, scaphoid  Ext: Warm, well perfused

## 2025-07-09 VITALS
TEMPERATURE: 97 F | SYSTOLIC BLOOD PRESSURE: 119 MMHG | DIASTOLIC BLOOD PRESSURE: 63 MMHG | HEART RATE: 90 BPM | RESPIRATION RATE: 18 BRPM | OXYGEN SATURATION: 99 %

## 2025-07-09 LAB
CULTURE RESULTS: ABNORMAL
GRAM STN FLD: ABNORMAL
SPECIMEN SOURCE: SIGNIFICANT CHANGE UP

## 2025-07-09 PROCEDURE — 99233 SBSQ HOSP IP/OBS HIGH 50: CPT

## 2025-07-09 RX ORDER — CEFAZOLIN SODIUM IN 0.9 % NACL 3 G/100 ML
2000 INTRAVENOUS SOLUTION, PIGGYBACK (ML) INTRAVENOUS ONCE
Refills: 0 | Status: DISCONTINUED | OUTPATIENT
Start: 2025-07-09 | End: 2025-07-09

## 2025-07-09 RX ORDER — CEPHALEXIN 250 MG/1
1000 CAPSULE ORAL EVERY 8 HOURS
Refills: 0 | Status: DISCONTINUED | OUTPATIENT
Start: 2025-07-09 | End: 2025-07-11

## 2025-07-09 RX ORDER — POTASSIUM CHLORIDE, DEXTROSE MONOHYDRATE AND SODIUM CHLORIDE 150; 5; 900 MG/100ML; G/100ML; MG/100ML
1000 INJECTION, SOLUTION INTRAVENOUS
Refills: 0 | Status: DISCONTINUED | OUTPATIENT
Start: 2025-07-09 | End: 2025-07-09

## 2025-07-09 RX ORDER — CEFAZOLIN SODIUM IN 0.9 % NACL 3 G/100 ML
2000 INTRAVENOUS SOLUTION, PIGGYBACK (ML) INTRAVENOUS EVERY 8 HOURS
Refills: 0 | Status: DISCONTINUED | OUTPATIENT
Start: 2025-07-09 | End: 2025-07-09

## 2025-07-09 RX ORDER — CEFAZOLIN SODIUM IN 0.9 % NACL 3 G/100 ML
INTRAVENOUS SOLUTION, PIGGYBACK (ML) INTRAVENOUS
Refills: 0 | Status: DISCONTINUED | OUTPATIENT
Start: 2025-07-09 | End: 2025-07-09

## 2025-07-09 RX ADMIN — INSULIN LISPRO 2 UNIT(S): 100 INJECTION, SOLUTION INTRAVENOUS; SUBCUTANEOUS at 12:01

## 2025-07-09 RX ADMIN — TIZANIDINE 4 MILLIGRAM(S): 4 TABLET ORAL at 22:40

## 2025-07-09 RX ADMIN — Medication 100 MILLIGRAM(S): at 06:01

## 2025-07-09 RX ADMIN — MEDPURA VITAMIN A AND D 1 APPLICATION(S): 95 OINTMENT TOPICAL at 18:17

## 2025-07-09 RX ADMIN — Medication 1 APPLICATION(S): at 06:01

## 2025-07-09 RX ADMIN — NALOXEGOL OXALATE 12.5 MILLIGRAM(S): 12.5 TABLET, FILM COATED ORAL at 09:40

## 2025-07-09 RX ADMIN — INSULIN GLARGINE-YFGN 6 UNIT(S): 100 INJECTION, SOLUTION SUBCUTANEOUS at 22:41

## 2025-07-09 RX ADMIN — INSULIN LISPRO 2: 100 INJECTION, SOLUTION INTRAVENOUS; SUBCUTANEOUS at 12:01

## 2025-07-09 RX ADMIN — Medication 2 MILLIGRAM(S): at 07:47

## 2025-07-09 RX ADMIN — Medication 40 MILLIGRAM(S): at 06:01

## 2025-07-09 RX ADMIN — Medication 2 MILLIGRAM(S): at 18:18

## 2025-07-09 RX ADMIN — Medication 2 MILLIGRAM(S): at 18:15

## 2025-07-09 RX ADMIN — CEPHALEXIN 1000 MILLIGRAM(S): 250 CAPSULE ORAL at 12:39

## 2025-07-09 RX ADMIN — Medication 1 APPLICATION(S): at 18:17

## 2025-07-09 RX ADMIN — SERTRALINE 50 MILLIGRAM(S): 100 TABLET, FILM COATED ORAL at 12:02

## 2025-07-09 RX ADMIN — CEPHALEXIN 1000 MILLIGRAM(S): 250 CAPSULE ORAL at 22:40

## 2025-07-09 RX ADMIN — Medication 2 MILLIGRAM(S): at 09:18

## 2025-07-09 NOTE — CHART NOTE - NSCHARTNOTEFT_GEN_A_CORE
Called for positive blood culture, staph aureus. Currently on CMO so no changes made Called for positive blood culture, staph aureus. Currently on CMO with oral antibiotics so no changes made

## 2025-07-09 NOTE — HOSPICE CARE NOTE - CONVESATION DETAILS
Follow up phone call from patient's spouse Claudy. Claudy is now in agreement with home hospice. DME to be delivered tomorrow. Hospice admission set for Sunday 7/13/25. Spouse is requesting that patient be discharged on Saturday 7/12/25.

## 2025-07-09 NOTE — PROGRESS NOTE ADULT - ASSESSMENT
73 yo female with h/o pancreatic cancer s/p chemotherapy presented with weakness. Pt was found to have bacteremia. Given pt advanced disease, GOC discussion has taken place between family and treatment team. Decision was made for CMO, hospice.    End of life care  DNR/DNI  CMO  -palliative care recommendation noted  -pending home hospice set up

## 2025-07-09 NOTE — PROGRESS NOTE ADULT - SUBJECTIVE AND OBJECTIVE BOX
CHIEF COMPLAINT:    Patient is a 74y old  Female who presents with a chief complaint of weakness (08 Jul 2025 16:42)      INTERVAL HPI/OVERNIGHT EVENTS:    Patient seen and examined at bedside with  present. Pt was lying in bed, sleeping, comfortable.     Medications:  Standing  bacitracin   Ointment 1 Application(s) Topical two times a day  cephalexin 1000 milliGRAM(s) Oral every 8 hours  chlorhexidine 2% Cloths 1 Application(s) Topical <User Schedule>  dextrose 5%. 1000 milliLiter(s) IV Continuous <Continuous>  dextrose 5%. 1000 milliLiter(s) IV Continuous <Continuous>  dextrose 50% Injectable 25 Gram(s) IV Push once  dextrose 50% Injectable 12.5 Gram(s) IV Push once  dextrose 50% Injectable 25 Gram(s) IV Push once  glucagon  Injectable 1 milliGRAM(s) IntraMuscular once  insulin glargine Injectable (LANTUS) 6 Unit(s) SubCutaneous at bedtime  naloxegol      naloxegol 12.5 milliGRAM(s) Oral before breakfast  pantoprazole    Tablet 40 milliGRAM(s) Oral before breakfast  sertraline 50 milliGRAM(s) Oral daily  tiZANidine 4 milliGRAM(s) Oral at bedtime  vitamin A & D Ointment 1 Application(s) Topical two times a day    PRN Meds  acetaminophen     Tablet .. 650 milliGRAM(s) Oral every 6 hours PRN  dextrose Oral Gel 15 Gram(s) Oral once PRN  HYDROmorphone   Tablet 2 milliGRAM(s) Oral every 3 hours PRN    Vital Signs:    T(F): 97.4 (07-09-25 @ 14:36), Max: 97.4 (07-09-25 @ 14:36)  HR: 90 (07-09-25 @ 14:36) (90 - 90)  BP: 119/63 (07-09-25 @ 14:36) (119/63 - 119/63)  RR: 18 (07-09-25 @ 14:36) (18 - 18)  SpO2: 99% (07-09-25 @ 14:36) (99% - 99%)  I&O's Summary    08 Jul 2025 07:01  -  09 Jul 2025 07:00  --------------------------------------------------------  IN: 0 mL / OUT: 900 mL / NET: -900 mL    09 Jul 2025 07:01  -  09 Jul 2025 17:47  --------------------------------------------------------  IN: 0 mL / OUT: 5 mL / NET: -5 mL      Daily     Daily   CAPILLARY BLOOD GLUCOSE      POCT Blood Glucose.: 147 mg/dL (09 Jul 2025 16:31)  POCT Blood Glucose.: 195 mg/dL (09 Jul 2025 11:49)  POCT Blood Glucose.: 109 mg/dL (09 Jul 2025 08:17)  POCT Blood Glucose.: 183 mg/dL (08 Jul 2025 21:06)      PHYSICAL EXAM:  GENERAL:  NAD, sick appearing  SKIN: No rashes or lesions  HEENT: Atraumatic. Normocephalic. Anicteric  NECK:  No JVD.   PULMONARY: Clear to ausculation bilaterally. No wheezing. No rales  CVS: Normal S1, S2. Regular rate and rhythm. No murmurs.  ABDOMEN/GI: Soft, Nontender, Nondistended; Bowel sounds are present  EXTREMITIES:  left elbow swelling    LABS:    Culture - Blood (collected 07 Jul 2025 06:26)  Source: Blood None  Gram Stain (09 Jul 2025 17:26):    Growth in anaerobic bottle: Gram Positive Cocci in Clusters    Growth in aerobic bottle: Gram Positive Cocci in Clusters  Final Report (09 Jul 2025 17:27):    Growth in anaerobic bottle: Staphylococcus aureus    See previous culture [id number]    Growth in aerobic bottle: Gram Positive Cocci in Clusters        RADIOLOGY & ADDITIONAL TESTS:  Imaging or report Personally Reviewed:  [ ] YES  [ ] NO -->no new images

## 2025-07-09 NOTE — PROGRESS NOTE ADULT - SUBJECTIVE AND OBJECTIVE BOX
HPI:   80-year-old female with past medical history of hypertension hyperlipidemia DVT on Eliquis recently being treated for pancreatic cancer on chemotherapy with chest port brought to ed for evaluation of the weakness. Hx taken from the patient  at bedside. States that for the past few days, patient has been very weak, dehydrated, not eating and drinking well, unable to walk and unable to take care of her self. Also reported that patient has been more sleepy recently and had a fall a few back.  said she normally has some dementia however the last 3 days, Patient was recently becoming more demented prior to the fall.  Patient complaining of diffuse body pain afterwards  (27 Jun 2025 15:17)    Interval history:  -patient at bedside with   -she noted pain well controlled today    ITEMS NOT CHECKED ARE NOT PRESENT    ADVANCE DIRECTIVES:    [ ] Full Code [x ] DNR  MOLST  [ ]  Living Will  [ ]   DECISION MAKER(s):  [ ] Health Care Proxy(s)  [ x] Surrogate(s)  [ ] Guardian           Name(s): Phone Number(s):      BASELINE (I)ADL(s) (prior to admission):  [place]: [ ]Total  [ ] Moderate [ ]Dependent      Allergies    No Known Allergies    Intolerances    MEDICATIONS  (STANDING):  bacitracin   Ointment 1 Application(s) Topical two times a day  cephalexin 1000 milliGRAM(s) Oral every 8 hours  chlorhexidine 2% Cloths 1 Application(s) Topical <User Schedule>  dextrose 5%. 1000 milliLiter(s) (50 mL/Hr) IV Continuous <Continuous>  dextrose 5%. 1000 milliLiter(s) (100 mL/Hr) IV Continuous <Continuous>  dextrose 50% Injectable 25 Gram(s) IV Push once  dextrose 50% Injectable 12.5 Gram(s) IV Push once  dextrose 50% Injectable 25 Gram(s) IV Push once  glucagon  Injectable 1 milliGRAM(s) IntraMuscular once  insulin glargine Injectable (LANTUS) 6 Unit(s) SubCutaneous at bedtime  insulin lispro (ADMELOG) corrective regimen sliding scale   SubCutaneous three times a day before meals  insulin lispro Injectable (ADMELOG) 2 Unit(s) SubCutaneous three times a day before meals  naloxegol      naloxegol 12.5 milliGRAM(s) Oral before breakfast  pantoprazole    Tablet 40 milliGRAM(s) Oral before breakfast  sertraline 50 milliGRAM(s) Oral daily  tiZANidine 4 milliGRAM(s) Oral at bedtime  vitamin A & D Ointment 1 Application(s) Topical two times a day    MEDICATIONS  (PRN):  acetaminophen     Tablet .. 650 milliGRAM(s) Oral every 6 hours PRN Temp greater or equal to 38C (100.4F), Mild Pain (1 - 3)  dextrose Oral Gel 15 Gram(s) Oral once PRN Blood Glucose LESS THAN 70 milliGRAM(s)/deciliter  HYDROmorphone   Tablet 2 milliGRAM(s) Oral every 3 hours PRN Pain        PRESENT SYMPTOMS: [ ]Unable to obtain due to poor mentation   Source if other than patient:  [ ]Family   [ ]Team     Pain: [ ]yes [ x]no  QOL impact -   Location -                    Aggravating factors -  Alleviating factors -   Quality -  Radiation -  Timing -   Severity (0-10 scale):  Minimal acceptable level (0-10 scale):     CPOT:    https://www.[email or web address]/getattachment/[email or web address]/Critical-Care-Pain-Observation-Tool-(CPOT)    PAIN AD Score:   http://geriatrictoolkit.[facility]/cog/painad.pdf     Dyspnea:                           [ x]None[ ]Mild [ ]Moderate [ ]Severe     Respiratory Distress Observation Scale (RDOS):   A score of 0 to 2 signifies little or no respiratory distress, 3 signifies mild distress, scores 4 to 6 indicate moderate distress, and scores greater than 7 signify severe distress  [email or web address]://[email or web address]/sites/default/files/[email or web address]-dbiudintbxy-cgakqpkf-fmqkujdhevg-bxhqk.pdf    Anxiety:                             [ ]None[ ]Mild [ ]Moderate [ ]Severe   Fatigue:                             [ ]None[ ]Mild [ ]Moderate [ ]Severe   Nausea:                             [ ]None[ ]Mild [ ]Moderate [ ]Severe   Loss of appetite:              [ ]None[ ]Mild [ ]Moderate [ ]Severe   Constipation:                    [ ]None[ ]Mild [ ]Moderate [ ]Severe    Other Symptoms:  [x ]All other review of systems negative     PHYSICAL EXAM:    GENERAL:  NAD   PULMONARY:  Non labored breathing  NEUROLOGIC: AAOx3  BEHAVIORAL/PSYCH:  Calm    Palliative Performance Status Version 2:      30   %    http://[email or web address]/files/news/palliative_performance_scale_ppsv2.pdf    LABS: I have reviewed daily labs, including                          9.0    6.12  )-----------( 131      ( 07 Jul 2025 06:26 )             29.2       07-07    148[H]  |  112[H]  |  13  ----------------------------<  129[H]  2.7[LL]   |  24  |  0.5[L]    Ca    8.3[L]      07 Jul 2025 06:26  Phos  2.2     07-07  Mg     1.9     07-07    TPro  5.1[L]  /  Alb  2.0[L]  /  TBili  0.5  /  DBili  x   /  AST  8   /  ALT  <5  /  AlkPhos  89  07-07              Urinalysis Basic - ( 07 Jul 2025 06:26 )    Color: x / Appearance: x / SG: x / pH: x  Gluc: 129 mg/dL / Ketone: x  / Bili: x / Urobili: x   Blood: x / Protein: x / Nitrite: x   Leuk Esterase: x / RBC: x / WBC x   Sq Epi: x / Non Sq Epi: x / Bacteria: x                  CAPILLARY BLOOD GLUCOSE      POCT Blood Glucose.: 95 mg/dL (07 Jul 2025 11:52)        Xray Chest 1 View AP/PA:   ACC: [id number] EXAM:  XR CHEST 1 VIEW   ORDERED BY: POONAM GERBER     PROCEDURE DATE:  07/07/2025          INTERPRETATION:  CLINICAL HISTORY: Shortness of breath    COMPARISON: July 6, 2025    TECHNIQUE: Portable frontal chest radiograph.    FINDINGS:    Support devices: None.    Cardiac/mediastinum/hilum: Heart size within normal limits, thoracic   aortic calcification..    Lung parenchyma/Pleura: Left opacity, left basilar focal   atelectasis/pleural effusion.    Skeleton/soft tissues: Stable.      IMPRESSION:    Redemonstration left opacity, left basilar focal atelectasis/pleural   effusion.    --- End of Report ---            RENEE MEEK MD; Attending Radiologist  This document has been electronically signed. Jul 7 2025  8:09AM (07-07-25 @ 07:29)        RADIOLOGY & ADDITIONAL STUDIES: I have independently reviewed new imaging,     PROTEIN CALORIE MALNUTRITION PRESENT: [ ]mild [ ]moderate [ ]severe [ ]underweight [ ]morbid obesity  https://www.[facility]al.org/vault/2440/web/files/ONC/Table_Clinical%20Characteristics%20to%20Document%20Malnutrition-White%20JV%20et%20al%202012.pdf    Height (cm): 172.7 (06-27-25 @ 18:21)  Weight (kg): 58.2 (06-27-25 @ 18:21)  BMI (kg/m2): 19.5 (06-27-25 @ 18:21)    [ ]PPSV2 < or = to 30% [ ]significant weight loss  [ ]poor nutritional intake  [ ]anasarca      [ ]Artificial Nutrition      Palliative Care Spiritual/Emotional Screening Tool Question  Severity (0-4):                    OR                    [ x] Unable to determine. Will assess at later time if appropriate.  Score of 2 or greater indicates recommendation of Chaplaincy and/or SW referral  Chaplaincy Referral: [ ] Yes [ ] Refused [ ] Following     Caregiver [place]:  [ ] Yes [ ] No    OR    [x ] Unable to determine. Will assess at later time if appropriate.  Social Work Referral [ ]  Patient and Family Centered Care Referral [ ]    Anticipatory Grief Present: [ ] Yes [ ] No    OR     [ x] Unable to determine. Will assess at later time if appropriate.  Social Work Referral [ ]  Patient and Family Centered Care Referral [ ]    REFERRALS:   [ ]Chaplaincy  [ ]Hospice  [ ]Child Life  [ ]Social Work  [ ]Case management [ ]Holistic Therapy     Palliative care education provided to patient and/or family

## 2025-07-09 NOTE — HOSPICE CARE NOTE - CONVESATION DETAILS
Follow up phone call to patient's spouse Claudy [phone], reviewed hospice philosophy again. Spouse is not in agreement with hospice philosophy at this time and said he wants to speak to the medical staff and may want to have his wife go home on home care. Hospice to remain available.

## 2025-07-09 NOTE — PROGRESS NOTE ADULT - ASSESSMENT
80-year-old female with past medical history of hypertension hyperlipidemia DVT on Eliquis recently being treated for pancreatic cancer on chemotherapy with chest port brought to ed for evaluation of the weakness. Patient found to have bacteremia. Palliative care consulted to assist with GOC.     Patient seen at bedside with . Pain well controlled. Patient's  noted that he was on the fence about hospcie vs home care. We discussed the patient's cancer, infection, and other comorbidities, and the patient confirmed she wanted hospice.  Hospice team and primary team alerted.    Plan:  -DNR/DNI  -Comfort measures only  -hospice referral placed - plan for home hospice  -continue dilaudid 2mg PO q3h PRN for pain  -please ensure patient is discharged with a prescription for 48 hours worth of PRN PO dilaudid on discharge to ensure patient has sufficient pain management prior to hospice admission.  -recommend adding ativan 0.5mg q4h PRN for anxiety/agitation  -will follow      Palliative care will continue to follow.   Please call [phone] with questions or concerns 24/7.

## 2025-07-10 LAB — CULTURE RESULTS: ABNORMAL

## 2025-07-10 PROCEDURE — 99232 SBSQ HOSP IP/OBS MODERATE 35: CPT

## 2025-07-10 RX ADMIN — Medication 1 APPLICATION(S): at 05:52

## 2025-07-10 RX ADMIN — MEDPURA VITAMIN A AND D 1 APPLICATION(S): 95 OINTMENT TOPICAL at 05:51

## 2025-07-10 RX ADMIN — SERTRALINE 50 MILLIGRAM(S): 100 TABLET, FILM COATED ORAL at 13:54

## 2025-07-10 RX ADMIN — Medication 1 APPLICATION(S): at 17:32

## 2025-07-10 RX ADMIN — Medication 2 MILLIGRAM(S): at 12:52

## 2025-07-10 RX ADMIN — Medication 2 MILLIGRAM(S): at 17:29

## 2025-07-10 RX ADMIN — Medication 2 MILLIGRAM(S): at 07:30

## 2025-07-10 RX ADMIN — CEPHALEXIN 1000 MILLIGRAM(S): 250 CAPSULE ORAL at 05:50

## 2025-07-10 RX ADMIN — MEDPURA VITAMIN A AND D 1 APPLICATION(S): 95 OINTMENT TOPICAL at 17:32

## 2025-07-10 RX ADMIN — Medication 40 MILLIGRAM(S): at 05:50

## 2025-07-10 RX ADMIN — NALOXEGOL OXALATE 12.5 MILLIGRAM(S): 12.5 TABLET, FILM COATED ORAL at 08:28

## 2025-07-10 RX ADMIN — CEPHALEXIN 1000 MILLIGRAM(S): 250 CAPSULE ORAL at 13:54

## 2025-07-10 RX ADMIN — CEPHALEXIN 1000 MILLIGRAM(S): 250 CAPSULE ORAL at 22:12

## 2025-07-10 RX ADMIN — Medication 2 MILLIGRAM(S): at 12:22

## 2025-07-10 RX ADMIN — TIZANIDINE 4 MILLIGRAM(S): 4 TABLET ORAL at 22:12

## 2025-07-10 RX ADMIN — Medication 2 MILLIGRAM(S): at 08:00

## 2025-07-10 NOTE — PROGRESS NOTE ADULT - SUBJECTIVE AND OBJECTIVE BOX
CHIEF COMPLAINT:    Patient is a 74y old  Female who presents with a chief complaint of weakness (09 Jul 2025 17:47)      INTERVAL HPI/OVERNIGHT EVENTS:    Patient seen and examined at bedside. Pt was lying on bed, sick appearing, comfortable. didn't offer any complaints.     Medications:  Standing  bacitracin   Ointment 1 Application(s) Topical two times a day  cephalexin 1000 milliGRAM(s) Oral every 8 hours  chlorhexidine 2% Cloths 1 Application(s) Topical <User Schedule>  dextrose 5%. 1000 milliLiter(s) IV Continuous <Continuous>  dextrose 5%. 1000 milliLiter(s) IV Continuous <Continuous>  dextrose 50% Injectable 25 Gram(s) IV Push once  dextrose 50% Injectable 12.5 Gram(s) IV Push once  dextrose 50% Injectable 25 Gram(s) IV Push once  glucagon  Injectable 1 milliGRAM(s) IntraMuscular once  insulin glargine Injectable (LANTUS) 6 Unit(s) SubCutaneous at bedtime  naloxegol      naloxegol 12.5 milliGRAM(s) Oral before breakfast  pantoprazole    Tablet 40 milliGRAM(s) Oral before breakfast  sertraline 50 milliGRAM(s) Oral daily  tiZANidine 4 milliGRAM(s) Oral at bedtime  vitamin A & D Ointment 1 Application(s) Topical two times a day    PRN Meds  acetaminophen     Tablet .. 650 milliGRAM(s) Oral every 6 hours PRN  dextrose Oral Gel 15 Gram(s) Oral once PRN  HYDROmorphone   Tablet 2 milliGRAM(s) Oral every 3 hours PRN        Vital Signs:    T(F): --  HR: --  BP: --  RR: --  SpO2: --  I&O's Summary    09 Jul 2025 07:01  -  10 Jul 2025 07:00  --------------------------------------------------------  IN: 0 mL / OUT: 1105 mL / NET: -1105 mL    10 Jul 2025 07:01  -  10 Jul 2025 17:11  --------------------------------------------------------  IN: 0 mL / OUT: 200 mL / NET: -200 mL      Daily     Daily   CAPILLARY BLOOD GLUCOSE      PHYSICAL EXAM:  GENERAL:  NAD, cachectic, sick appearing  SKIN: No rashes or lesions  HEENT: Atraumatic. Normocephalic  NECK:  No JVD.   EXTREMITIES:  left elbow swelling  : Aguilera in place       LABS:    RADIOLOGY & ADDITIONAL TESTS:  Imaging or report Personally Reviewed:  [ ] YES  [ ] NO -->no new images

## 2025-07-10 NOTE — PROGRESS NOTE ADULT - ASSESSMENT
75 yo female with h/o pancreatic cancer s/p chemotherapy presented with weakness. Pt was found to have bacteremia. Given pt advanced disease, GOC discussion has taken place between family and treatment team. Decision was made for CMO, hospice.    End of life care  DNR/DNI  CMO  -palliative care recommendation noted  -pending home hospice set up

## 2025-07-10 NOTE — PROGRESS NOTE ADULT - NUTRITIONAL ASSESSMENT
This patient has been assessed with a concern for Malnutrition and has been determined to have a diagnosis/diagnoses of Severe protein-calorie malnutrition.
This patient has been assessed with a concern for Malnutrition and has been determined to have a diagnosis/diagnoses of Severe protein-calorie malnutrition.    This patient is being managed with:   Diet NPO with Tube Feed-  Tube Feeding Modality: Nasogastric  Glucerna 1.2 Papa (GLUCERNARTH)  Total Volume for 24 Hours (mL): 720  Bolus  Total Volume of Bolus (mL):  240  Tube Feed Frequency: Every 8 hours   Tube Feed Start Time: 14:00  Bolus Feed Rate (mL per Hour): 240   Bolus Feed Duration (in Hours): 1  Free Water Flush  Free Water Flush Instructions:  50 ml pre and post feed with additional flushes of 200 ml q 4 hrs  Entered: Jul 1 2025  2:10PM  
This patient has been assessed with a concern for Malnutrition and has been determined to have a diagnosis/diagnoses of Severe protein-calorie malnutrition.    This patient is being managed with:   Diet Pureed-  Entered: Jul 4 2025 10:39AM  
This patient has been assessed with a concern for Malnutrition and has been determined to have a diagnosis/diagnoses of Severe protein-calorie malnutrition.    This patient is being managed with:   Diet NPO with Tube Feed-  Tube Feeding Modality: Nasogastric  Glucerna 1.2 Papa (GLUCERNARTH)  Total Volume for 24 Hours (mL): 720  Bolus  Total Volume of Bolus (mL):  240  Tube Feed Frequency: Every 8 hours   Tube Feed Start Time: 14:00  Bolus Feed Rate (mL per Hour): 240   Bolus Feed Duration (in Hours): 1  Free Water Flush  Free Water Flush Instructions:  50 ml pre and post feed with additional flushes of 200 ml q 4 hrs  Entered: Jul 1 2025  2:10PM  
This patient has been assessed with a concern for Malnutrition and has been determined to have a diagnosis/diagnoses of Severe protein-calorie malnutrition.    This patient is being managed with:   Diet NPO with Tube Feed-  Tube Feeding Modality: Nasogastric  Glucerna 1.2 Papa (GLUCERNARTH)  Total Volume for 24 Hours (mL): 720  Bolus  Total Volume of Bolus (mL):  240  Tube Feed Frequency: Every 8 hours   Tube Feed Start Time: 14:00  Bolus Feed Rate (mL per Hour): 240   Bolus Feed Duration (in Hours): 1  Free Water Flush  Free Water Flush Instructions:  50 ml pre and post feed with additional flushes of 200 ml q 4 hrs  Entered: Jul 1 2025  2:10PM  
This patient has been assessed with a concern for Malnutrition and has been determined to have a diagnosis/diagnoses of Severe protein-calorie malnutrition.    This patient is being managed with:   Diet Soft and Bite Sized-  Entered: Jul 6 2025  2:29PM  
This patient has been assessed with a concern for Malnutrition and has been determined to have a diagnosis/diagnoses of Severe protein-calorie malnutrition.    This patient is being managed with:   Diet NPO after Midnight-     NPO Start Date: 06-Jul-2025   NPO Start Time: 23:59  Entered: Jul 6 2025  4:43PM    Diet Soft and Bite Sized-  Entered: Jul 6 2025  2:29PM    Diet NPO-  Tube Feeding Modality: Nasogastric  Jevity 1.2 Papa (JEVITY1.2RTH)  Total Volume for 24 Hours (mL): 1560  Continuous  Starting Tube Feed Rate {mL per Hour}: 15  Increase Tube Feed Rate by (mL): 10     Every 4 hours  Until Goal Tube Feed Rate (mL per Hour): 65  Tube Feed Duration (in Hours): 24  Tube Feed Start Time: 13:30  Free Water Flush   Total Volume per Flush (mL): 200   Frequency: Every 4 Hours  Entered: Jul 5 2025  6:51PM  
This patient has been assessed with a concern for Malnutrition and has been determined to have a diagnosis/diagnoses of Severe protein-calorie malnutrition.    This patient is being managed with:   Diet Pureed-  Consistent Carbohydrate {No Snacks} (CSTCHO)  Tube Feeding Modality: Nasogastric  Glucerna 1.2 Papa (GLUCERNARTH)  Total Volume for 24 Hours (mL): 1080  Bolus  Total Volume of Bolus (mL):  360  Tube Feed Frequency: Every 8 hours   Tube Feed Start Time: 14:00  Bolus Feed Rate (mL per Hour): 360   Bolus Feed Duration (in Hours): 1  Free Water Flush  Free Water Flush Instructions:  50 ml pre and post feed  Entered: Jul 4 2025  6:50PM  
This patient has been assessed with a concern for Malnutrition and has been determined to have a diagnosis/diagnoses of Severe protein-calorie malnutrition.    This patient is being managed with:   Diet NPO with Tube Feed-  Tube Feeding Modality: Nasogastric  Glucerna 1.2 Papa (GLUCERNARTH)  Total Volume for 24 Hours (mL): 720  Bolus  Total Volume of Bolus (mL):  240  Tube Feed Frequency: Every 8 hours   Tube Feed Start Time: 14:00  Bolus Feed Rate (mL per Hour): 240   Bolus Feed Duration (in Hours): 1  Free Water Flush  Free Water Flush Instructions:  50 ml pre and post feed with additional flushes of 200 ml q 4 hrs  Entered: Jul 1 2025  2:10PM  
This patient has been assessed with a concern for Malnutrition and has been determined to have a diagnosis/diagnoses of Severe protein-calorie malnutrition.    This patient is being managed with:   Diet NPO-  Entered: Jun 30 2025  8:50AM  
This patient has been assessed with a concern for Malnutrition and has been determined to have a diagnosis/diagnoses of Severe protein-calorie malnutrition.    This patient is being managed with:   Diet Pureed-  Consistent Carbohydrate {No Snacks} (CSTCHO)  Tube Feeding Modality: Nasogastric  Glucerna 1.2 Papa (GLUCERNARTH)  Total Volume for 24 Hours (mL): 1080  Bolus  Total Volume of Bolus (mL):  360  Tube Feed Frequency: Every 8 hours   Tube Feed Start Time: 14:00  Bolus Feed Rate (mL per Hour): 360   Bolus Feed Duration (in Hours): 1  Bolus Feed Instructions:  PLEASE HOLD FEEDING IF PT CONSUMES >50% OF TRAY  Free Water Flush  Free Water Flush Instructions:  50 ML PRE AND POST FEED  Entered: Jul 5 2025  7:02PM    Diet NPO-  Tube Feeding Modality: Nasogastric  Jevity 1.2 Papa (JEVITY1.2RTH)  Total Volume for 24 Hours (mL): 1560  Continuous  Starting Tube Feed Rate {mL per Hour}: 15  Increase Tube Feed Rate by (mL): 10     Every 4 hours  Until Goal Tube Feed Rate (mL per Hour): 65  Tube Feed Duration (in Hours): 24  Tube Feed Start Time: 13:30  Free Water Flush   Total Volume per Flush (mL): 200   Frequency: Every 4 Hours  Entered: Jul 5 2025  6:51PM  
This patient has been assessed with a concern for Malnutrition and has been determined to have a diagnosis/diagnoses of Severe protein-calorie malnutrition.    This patient is being managed with:   Diet NPO with Tube Feed-  Tube Feeding Modality: Nasogastric  Glucerna 1.2 Papa (GLUCERNARTH)  Total Volume for 24 Hours (mL): 720  Bolus  Total Volume of Bolus (mL):  240  Tube Feed Frequency: Every 8 hours   Tube Feed Start Time: 14:00  Bolus Feed Rate (mL per Hour): 240   Bolus Feed Duration (in Hours): 1  Free Water Flush  Free Water Flush Instructions:  50 ml pre and post feed with additional flushes of 200 ml q 4 hrs  Entered: Jul 1 2025  2:10PM  
This patient has been assessed with a concern for Malnutrition and has been determined to have a diagnosis/diagnoses of Severe protein-calorie malnutrition.    This patient is being managed with:   Diet Soft and Bite Sized-  Entered: Jul 6 2025  2:29PM  
This patient has been assessed with a concern for Malnutrition and has been determined to have a diagnosis/diagnoses of Severe protein-calorie malnutrition.    This patient is being managed with:   Diet NPO after Midnight-     NPO Start Date: 06-Jul-2025   NPO Start Time: 23:59  Entered: Jul 6 2025  4:43PM    Diet Soft and Bite Sized-  Entered: Jul 6 2025  2:29PM    Diet NPO-  Tube Feeding Modality: Nasogastric  Jevity 1.2 Papa (JEVITY1.2RTH)  Total Volume for 24 Hours (mL): 1560  Continuous  Starting Tube Feed Rate {mL per Hour}: 15  Increase Tube Feed Rate by (mL): 10     Every 4 hours  Until Goal Tube Feed Rate (mL per Hour): 65  Tube Feed Duration (in Hours): 24  Tube Feed Start Time: 13:30  Free Water Flush   Total Volume per Flush (mL): 200   Frequency: Every 4 Hours  Entered: Jul 5 2025  6:51PM

## 2025-07-11 ENCOUNTER — TRANSCRIPTION ENCOUNTER (OUTPATIENT)
Age: 75
End: 2025-07-11

## 2025-07-11 PROCEDURE — 99239 HOSP IP/OBS DSCHRG MGMT >30: CPT

## 2025-07-11 PROCEDURE — 99231 SBSQ HOSP IP/OBS SF/LOW 25: CPT

## 2025-07-11 RX ORDER — HYDROMORPHONE/SOD CHLOR,ISO/PF 2 MG/10 ML
2 SYRINGE (ML) INJECTION ONCE
Refills: 0 | Status: DISCONTINUED | OUTPATIENT
Start: 2025-07-11 | End: 2025-07-11

## 2025-07-11 RX ORDER — APIXABAN 5 MG/1
1 TABLET, FILM COATED ORAL
Refills: 0 | DISCHARGE

## 2025-07-11 RX ORDER — HYDROMORPHONE/SOD CHLOR,ISO/PF 2 MG/10 ML
1 SYRINGE (ML) INJECTION
Qty: 12 | Refills: 0
Start: 2025-07-11 | End: 2025-07-12

## 2025-07-11 RX ORDER — LANSOPRAZOLE 30 MG/1
1 CAPSULE, DELAYED RELEASE ORAL
Refills: 0 | DISCHARGE

## 2025-07-11 RX ORDER — CEPHALEXIN 250 MG/1
2 CAPSULE ORAL
Qty: 252 | Refills: 0
Start: 2025-07-11 | End: 2025-08-21

## 2025-07-11 RX ORDER — HYDROMORPHONE/SOD CHLOR,ISO/PF 2 MG/10 ML
1 SYRINGE (ML) INJECTION
Refills: 0 | DISCHARGE

## 2025-07-11 RX ORDER — LIPASE/PROTEASE/AMYLASE 10K-37.5K
1 CAPSULE,DELAYED RELEASE (ENTERIC COATED) ORAL
Refills: 0 | DISCHARGE

## 2025-07-11 RX ORDER — HYDROMORPHONE/SOD CHLOR,ISO/PF 2 MG/10 ML
1 SYRINGE (ML) INJECTION
Qty: 16 | Refills: 0
Start: 2025-07-11 | End: 2025-07-12

## 2025-07-11 RX ORDER — GLIMEPIRIDE 4 MG/1
1 TABLET ORAL
Refills: 0 | DISCHARGE

## 2025-07-11 RX ORDER — LORATADINE 5 MG/5ML
1 SOLUTION ORAL
Refills: 0 | DISCHARGE

## 2025-07-11 RX ADMIN — Medication 1 APPLICATION(S): at 17:18

## 2025-07-11 RX ADMIN — Medication 2 MILLIGRAM(S): at 19:18

## 2025-07-11 RX ADMIN — NALOXEGOL OXALATE 12.5 MILLIGRAM(S): 12.5 TABLET, FILM COATED ORAL at 06:06

## 2025-07-11 RX ADMIN — SERTRALINE 50 MILLIGRAM(S): 100 TABLET, FILM COATED ORAL at 12:46

## 2025-07-11 RX ADMIN — Medication 2 MILLIGRAM(S): at 07:07

## 2025-07-11 RX ADMIN — Medication 2 MILLIGRAM(S): at 09:47

## 2025-07-11 RX ADMIN — Medication 2 MILLIGRAM(S): at 14:33

## 2025-07-11 RX ADMIN — CEPHALEXIN 1000 MILLIGRAM(S): 250 CAPSULE ORAL at 14:23

## 2025-07-11 RX ADMIN — Medication 1 APPLICATION(S): at 06:07

## 2025-07-11 RX ADMIN — MEDPURA VITAMIN A AND D 1 APPLICATION(S): 95 OINTMENT TOPICAL at 06:05

## 2025-07-11 RX ADMIN — Medication 2 MILLIGRAM(S): at 10:07

## 2025-07-11 RX ADMIN — Medication 2 MILLIGRAM(S): at 13:21

## 2025-07-11 RX ADMIN — Medication 2 MILLIGRAM(S): at 12:51

## 2025-07-11 RX ADMIN — CEPHALEXIN 1000 MILLIGRAM(S): 250 CAPSULE ORAL at 20:04

## 2025-07-11 RX ADMIN — Medication 1 APPLICATION(S): at 06:06

## 2025-07-11 RX ADMIN — TIZANIDINE 4 MILLIGRAM(S): 4 TABLET ORAL at 20:04

## 2025-07-11 RX ADMIN — Medication 15 MILLIGRAM(S): at 16:50

## 2025-07-11 RX ADMIN — CEPHALEXIN 1000 MILLIGRAM(S): 250 CAPSULE ORAL at 06:06

## 2025-07-11 RX ADMIN — Medication 2 MILLIGRAM(S): at 06:06

## 2025-07-11 RX ADMIN — MEDPURA VITAMIN A AND D 1 APPLICATION(S): 95 OINTMENT TOPICAL at 17:18

## 2025-07-11 RX ADMIN — Medication 40 MILLIGRAM(S): at 06:06

## 2025-07-11 RX ADMIN — INSULIN GLARGINE-YFGN 6 UNIT(S): 100 INJECTION, SOLUTION SUBCUTANEOUS at 20:02

## 2025-07-11 RX ADMIN — Medication 15 MILLIGRAM(S): at 16:10

## 2025-07-11 RX ADMIN — Medication 2 MILLIGRAM(S): at 15:00

## 2025-07-11 NOTE — PROGRESS NOTE ADULT - SUBJECTIVE AND OBJECTIVE BOX
HPI:   80-year-old female with past medical history of hypertension hyperlipidemia DVT on Eliquis recently being treated for pancreatic cancer on chemotherapy with chest port brought to ed for evaluation of the weakness. Hx taken from the patient  at bedside. States that for the past few days, patient has been very weak, dehydrated, not eating and drinking well, unable to walk and unable to take care of her self. Also reported that patient has been more sleepy recently and had a fall a few back.  said she normally has some dementia however the last 3 days, Patient was recently becoming more demented prior to the fall.  Patient complaining of diffuse body pain afterwards  (27 Jun 2025 15:17)    Interval history:  7/11/25- Patient remains on Comfort Care. Met with patient and spouse Claudy at bedside. Patient AAOx4 states her pain is now 6/10 and she is "not making it to the next dose." Patient is planned for discharge home with hospice, hoping for today.      ITEMS NOT CHECKED ARE NOT PRESENT    ADVANCE DIRECTIVES:    [ ] Full Code [x ] DNR  MOLST  [ ]  Living Will  [ ]   DECISION MAKER(s):  [ ] Health Care Proxy(s)  [ x] Surrogate(s)  [ ] Guardian           Name(s): Phone Number(s):      BASELINE (I)ADL(s) (prior to admission):  [place]: [ ]Total  [ ] Moderate [ ]Dependent      Allergies    No Known Allergies    Intolerances    MEDICATIONS  (STANDING):  bacitracin   Ointment 1 Application(s) Topical two times a day  cephalexin 1000 milliGRAM(s) Oral every 8 hours  chlorhexidine 2% Cloths 1 Application(s) Topical <User Schedule>  dextrose 5%. 1000 milliLiter(s) (50 mL/Hr) IV Continuous <Continuous>  dextrose 5%. 1000 milliLiter(s) (100 mL/Hr) IV Continuous <Continuous>  dextrose 50% Injectable 25 Gram(s) IV Push once  dextrose 50% Injectable 12.5 Gram(s) IV Push once  dextrose 50% Injectable 25 Gram(s) IV Push once  glucagon  Injectable 1 milliGRAM(s) IntraMuscular once  HYDROmorphone   Tablet 2 milliGRAM(s) Oral once  insulin glargine Injectable (LANTUS) 6 Unit(s) SubCutaneous at bedtime  morphine ER Tablet 15 milliGRAM(s) Oral every 12 hours  naloxegol 12.5 milliGRAM(s) Oral before breakfast  naloxegol      pantoprazole    Tablet 40 milliGRAM(s) Oral before breakfast  sertraline 50 milliGRAM(s) Oral daily  tiZANidine 4 milliGRAM(s) Oral at bedtime  vitamin A & D Ointment 1 Application(s) Topical two times a day    MEDICATIONS  (PRN):  acetaminophen     Tablet .. 650 milliGRAM(s) Oral every 6 hours PRN Temp greater or equal to 38C (100.4F), Mild Pain (1 - 3)  dextrose Oral Gel 15 Gram(s) Oral once PRN Blood Glucose LESS THAN 70 milliGRAM(s)/deciliter  HYDROmorphone   Tablet 2 milliGRAM(s) Oral every 3 hours PRN Pain      PRESENT SYMPTOMS: [ ]Unable to obtain due to poor mentation   Source if other than patient:  [ ]Family   [ ]Team     Pain: [x ]yes [ ]no  QOL impact - unable to identify  Location -  generalized                  Aggravating factors - unable to identify  Alleviating factors - the "pain medications but lasts for only a little bit"   Quality -  unable to identify  Radiation -   unable to identify  Timing - unable to identify  Severity (0-10 scale): 6/10  Minimal acceptable level (0-10 scale): 3/10    CPOT:    https://www.[email or web address]/getattachment/[email or web address]/Critical-Care-Pain-Observation-Tool-(CPOT)    PAIN AD Score:   http://geriatrictoolkit.[place].[facility]/cog/painad.pdf     Dyspnea:                           [ x]None[ ]Mild [ ]Moderate [ ]Severe     Respiratory Distress Observation Scale (RDOS):   A score of 0 to 2 signifies little or no respiratory distress, 3 signifies mild distress, scores 4 to 6 indicate moderate distress, and scores greater than 7 signify severe distress  [email or web address]/sites/default/[email or web address]-dcixecjqhep-qppgpvqw-kcwccisyqec-[email or web address].pdf    Anxiety:                             [ ]None[ ]Mild [ ]Moderate [ ]Severe   Fatigue:                             [ ]None[ ]Mild [ ]Moderate [ ]Severe   Nausea:                             [ ]None[ ]Mild [ ]Moderate [ ]Severe   Loss of appetite:              [ ]None[ ]Mild [ ]Moderate [ ]Severe   Constipation:                    [ ]None[ ]Mild [ ]Moderate [ ]Severe    Other Symptoms:  [x ]All other review of systems negative     PHYSICAL EXAM:    GENERAL:  NAD   PULMONARY:  Non labored breathing  NEUROLOGIC: AAOx4  BEHAVIORAL/PSYCH:  Calm    Palliative Performance Status Version 2:      30   %    [email or web address].[email or web address]/files/news/palliative_performance_scale_[email or web address].pdf    LABS: patient is CMO. I have reviewed last labs on 7/7- GFR 99, Cr 0.5  CBC:   Chem:   Liver Functions:   Type & Screen:   Bilirubin:   TSH:   T4:               PROTEIN CALORIE MALNUTRITION PRESENT: [ ]mild [ ]moderate [ ]severe [ ]underweight [ ]morbid obesity  https://www.andeal.org/[place]/2440/web/files/ONC/Table_Clinical%20Characteristics%20to%20Document%20Malnutrition-White%20JV%20et%20al%202012.pdf    Height (cm): 172.7 (06-27-25 @ 18:21)  Weight (kg): 58.2 (06-27-25 @ 18:21)  BMI (kg/m2): 19.5 (06-27-25 @ 18:21)    [ ]PPSV2 < or = to 30% [ ]significant weight loss  [ ]poor nutritional intake  [ ]anasarca      [ ]Artificial Nutrition      Palliative Care Spiritual/Emotional Screening Tool Question  Severity (0-4):                    OR                    [ x] Unable to determine. Will assess at later time if appropriate.  Score of 2 or greater indicates recommendation of Chaplaincy and/or SW referral  Chaplaincy Referral: [ ] Yes [ ] Refused [ ] Following     Caregiver [place]:  [ ] Yes [ ] No    OR    [x ] Unable to determine. Will assess at later time if appropriate.  Social Work Referral [ ]  Patient and Family Centered Care Referral [ ]    Anticipatory Grief Present: [ ] Yes [ ] No    OR     [ x] Unable to determine. Will assess at later time if appropriate.  Social Work Referral [ ]  Patient and Family Centered Care Referral [ ]    REFERRALS:   [ ]Chaplaincy  [ ]Hospice  [ ]Child Life  [ ]Social Work  [ ]Case management [ ]Holistic Therapy     Palliative care education provided to patient and/or family

## 2025-07-11 NOTE — DISCHARGE NOTE NURSING/CASE MANAGEMENT/SOCIAL WORK - PATIENT PORTAL LINK FT
You can access the FollowMyHealth Patient Portal offered by [facility] by registering at the following website: [email or web address]. By joining [employer]’s FollowMyHealth portal, you will also be able to view your health information using other applications (apps) compatible with our system.

## 2025-07-11 NOTE — PROGRESS NOTE ADULT - ASSESSMENT
This is a 80-year-old female with past medical history of hypertension hyperlipidemia DVT being treated for pancreatic cancer on chemotherapy with chest port brought to ed for evaluation of the weakness.    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    Covid/Flu/RSV negative   MRSA nares negative     RECOMMENDATIONS  -Urine cultures noted for E.coli (Sensitive to Cefazolin)  -TTE noted. Tricuspid valve thickening. Can not rule out vegetation.  -Port explanted 7/2/2025-Pus noted in the cavity   -Patient has opted for CMO, can be transitioned to PO Dicloxacillin 1 gram 6 hours or cephalexin 1 gram q 8 hours for 6 weeks palliatively, but unlikely to cure the infection.   -Local wound care for the sacral ulcer. Avoid constipation.  -Off loading to prevent pressure sores and preventive measures to avoid aspiration. GOC. TOV if appropriate. Grave prognosis.    If any questions, please send a message or call on [employer] Teams  Please continue to update ID with any pertinent new laboratory or radiographic findings.    Amado Irwin M.D  Infectious Diseases Attending/   [employer] at [facility]

## 2025-07-11 NOTE — DISCHARGE NOTE NURSING/CASE MANAGEMENT/SOCIAL WORK - NSDCPEFALRISK_GEN_ALL_CORE
For information on Fall & Injury Prevention, visit: [email or web address]://[email or web address].[facility]/news/fall-prevention-protects-and-maintains-health-and-mobility OR  [email or web address]://www.[facility]/news/fall-prevention-tips-to-avoid-injury OR  https://www.cdc.gov/steadi/patient.html

## 2025-07-11 NOTE — CHART NOTE - NSCHARTNOTESELECT_GEN_ALL_CORE
Event Note
Nutrition Services
Palliative Care- Social Work/Event Note
blood cx
Cardiology/Event Note
Event Note
Event Note
MED PA/Event Note
Nutrition Services
PA note
Palliative/Event Note
Transfer Note
istop/Event Note

## 2025-07-11 NOTE — PROGRESS NOTE ADULT - ASSESSMENT
80-year-old female with past medical history of hypertension hyperlipidemia DVT on Eliquis recently being treated for pancreatic cancer on chemotherapy with chest port brought to ed for evaluation of the weakness. Patient found to have bacteremia. Palliative care consulted to assist with GOC.     Patient seen at bedside with . Patient remains on comfort care, awaiting discharge to hospice at home. Spouse hoping for discharge today, awaiting for case management follow up. Pain not as well controlled today as patient reports pain 6/10 and that "she is not making it to the next dose."  Patient received 4 doses of PO Dilaudid in 24 hours.      Plan:  - Add Hydromorphone 2mg PO x1 for breakthrough pain  -Continue with Hydromorphone 2mg PO every 3 hours PRN (moderate to severe pain)  - Add MS Contin 15mg PO twice daily (first dose now, then 2nd dose on 7/12/25 in AM)  -recommend adding ativan 0.5mg q4h PRN for anxiety/agitation  -DNR/DNI/Comfort measures only  -plan for home hospice admission this weekend- case management follow up    -Please ensure patient is discharged with a prescription for 48 hours worth of PRN PO Dilaudid and MS Contin on discharge to ensure patient has sufficient pain management prior to hospice admission.    -will follow. Above discussed with primary attending.       Palliative care will continue to follow.   Please call [phone] with questions or concerns 24/7.

## 2025-07-11 NOTE — CHART NOTE - NSCHARTNOTEFT_GEN_A_CORE
Follow up: Pt was resting, No family were at bedside. I was a silent pastoral presence I will follow up support

## 2025-07-11 NOTE — PROGRESS NOTE ADULT - REASON FOR ADMISSION
weakness

## 2025-07-11 NOTE — PROGRESS NOTE ADULT - SUBJECTIVE AND OBJECTIVE BOX
KIMMY LINDA  74y, Female    LOS  14d    INTERVAL EVENTS/HPI  - No acute events overnight    REVIEW OF SYSTEMS:  CONSTITUTIONAL: No fever or chills  HEENT: No sore throat  RESPIRATORY: No cough, no shortness of breath  CARDIOVASCULAR: No chest pain or palpitations  GASTROINTESTINAL: No abdominal or epigastric pain  GENITOURINARY: No dysuria  NEUROLOGICAL: No headache/dizziness  MSK: No joint pain, erythema, or swelling; no back pain  SKIN: No itching, rashes  All other ROS negative except noted above    Prior hospital charts reviewed [Yes]  Primary team notes reviewed [Yes]  Other consultant notes reviewed [Yes]    ANTIMICROBIALS:   cephalexin 1000 every 8 hours      OTHER MEDS: MEDICATIONS  (STANDING):  acetaminophen     Tablet .. 650 every 6 hours PRN  dextrose 50% Injectable 25 once  dextrose 50% Injectable 12.5 once  dextrose 50% Injectable 25 once  dextrose Oral Gel 15 once PRN  glucagon  Injectable 1 once  HYDROmorphone   Tablet 2 every 3 hours PRN  insulin glargine Injectable (LANTUS) 6 at bedtime  naloxegol    naloxegol 12.5 before breakfast  pantoprazole    Tablet 40 before breakfast  sertraline 50 daily  tiZANidine 4 at bedtime      Vital Signs Last 24 Hrs  T(F): 97.4 (07-09-25 @ 14:36), Max: 98.3 (07-04-25 @ 20:22)    Vital Signs Last 24 Hrs  HR: --  BP: --  RR: --  SpO2: --  Wt(kg): --    EXAM:  GENERAL: In NAD  NECK: Supple  CHEST/LUNG: Shallow breath sounds.   HEART: S1 S2  ABDOMEN: Soft, nontender  EXTREMITIES: No clubbing  NERVOUS SYSTEM: Awake.   MSK: No joint erythema, swelling or pain  BACK: unstageable sacral ulcer       Labs:            WBC Trend:  WBC Count: 6.12 (07-07-25 @ 06:26)      Creatine Trend:  Creatinine: 0.5 (07-07)  Creatinine: 0.5 (07-06)  Creatinine: 0.6 (07-05)  Creatinine: 0.6 (07-04)      Liver Biochemical Testing Trend:  Alanine Aminotransferase (ALT/SGPT): <5 (07-07)  Alanine Aminotransferase (ALT/SGPT): <5 (07-06)  Alanine Aminotransferase (ALT/SGPT): <5 (07-05)  Alanine Aminotransferase (ALT/SGPT): <5 (07-04)  Alanine Aminotransferase (ALT/SGPT): <5 (07-03)  Aspartate Aminotransferase (AST/SGOT): 8 (07-07-25 @ 06:26)  Aspartate Aminotransferase (AST/SGOT): 8 (07-06-25 @ 07:42)  Aspartate Aminotransferase (AST/SGOT): 10 (07-05-25 @ 07:49)  Aspartate Aminotransferase (AST/SGOT): 10 (07-04-25 @ 05:52)  Aspartate Aminotransferase (AST/SGOT): 13 (07-03-25 @ 05:48)  Bilirubin Total: 0.5 (07-07)  Bilirubin Total: 0.5 (07-06)  Bilirubin Total: 0.5 (07-05)  Bilirubin Total: 0.5 (07-04)  Bilirubin Total: 0.6 (07-03)      Trend LDH          MICROBIOLOGY:    Female    Culture - Blood (collected 07 Jul 2025 06:26)  Source: Blood None  Final Report:    Growth in aerobic and anaerobic bottles: Staphylococcus aureus    See previous culture [id number]    Growth in aerobic bottle: blood culture bottle turned positive after the    final report was released.    Culture - Blood (collected 06 Jul 2025 07:42)  Source: Blood None  Final Report:    Growth in aerobic and anaerobic bottles: Staphylococcus aureus    See previous culture [id number]    Culture - Blood (collected 06 Jul 2025 07:42)  Source: Blood None  Final Report:    Growth in aerobic and anaerobic bottles: Staphylococcus aureus    See previous culture [id number]    Culture - Blood (collected 05 Jul 2025 07:49)  Source: Blood None  Final Report:    Growth in aerobic and anaerobic bottles: Staphylococcus aureus  Organism: Staphylococcus aureus  Organism: Staphylococcus aureus    Sensitivities:      -  Clindamycin: S <=0.25      -  Oxacillin: S 1 Oxacillin predicts susceptibility for dicloxacillin, methicillin, and nafcillin      -  Gentamicin: S <=4 Should not be used as monotherapy      -  Vancomycin: S 1      -  Tetracycline: S <=4      Method Type: AUGUSTINE      -  Penicillin: R >2      -  Rifampin: S <=1 Should not be used as monotherapy      -  Erythromycin: S <=0.25      -  Trimethoprim/Sulfamethoxazole: S <=0.5/9.5    Culture - Blood (collected 04 Jul 2025 05:52)  Source: Blood None  Final Report:    Growth in aerobic and anaerobic bottles: Staphylococcus aureus    See previous culture [id number]    Culture - Blood (collected 03 Jul 2025 05:48)  Source: Blood None  Final Report:    Growth in aerobic and anaerobic bottles: Staphylococcus aureus    See previous culture [id number]    Culture - Blood (collected 02 Jul 2025 05:42)  Source: Blood None  Final Report:    Growth in aerobic and anaerobic bottles: Staphylococcus aureus    See previous culture [id number]    Culture - Blood (collected 01 Jul 2025 16:20)  Source: Blood Blood-Catheter  Final Report:    Growth in aerobic and anaerobic bottles: Staphylococcus haemolyticus    "Susceptibilities not performed"    Growth in aerobic bottle: Staphylococcus aureus    See previous culture [id number]    Culture - Blood (collected 01 Jul 2025 05:48)  Source: Blood None  Final Report:    Growth in aerobic and anaerobic bottles: Staphylococcus aureus  Organism: Staphylococcus aureus  Organism: Staphylococcus aureus    Sensitivities:      -  Clindamycin: S <=0.25      -  Oxacillin: S 1 Oxacillin predicts susceptibility for dicloxacillin, methicillin, and nafcillin      -  Gentamicin: S <=4 Should not be used as monotherapy      -  Vancomycin: S 1      -  Tetracycline: S <=4      Method Type: AUGUSTINE      -  Penicillin: R >2      -  Rifampin: S <=1 Should not be used as monotherapy      -  Erythromycin: S <=0.25      -  Trimethoprim/Sulfamethoxazole: S <=0.5/9.5    Culture - Blood (collected 30 Jun 2025 05:46)  Source: Blood None  Final Report:    No growth at 5 days  RADIOLOGY & ADDITIONAL TESTS:  I have personally reviewed the relevant images.   CXR      CT  < from: Xray Chest 1 View AP/PA (07.07.25 @ 07:29) >  IMPRESSION:    Redemonstration left opacity, left basilar focal atelectasis/pleural   effusion.    --- End of Report ---    < end of copied text >        WEIGHT  Weight (kg): 58.2 (06-27-25 @ 18:21)      All available historical records have been reviewed

## 2025-07-11 NOTE — DISCHARGE NOTE NURSING/CASE MANAGEMENT/SOCIAL WORK - FINANCIAL ASSISTANCE
[facility] provides services at a reduced cost to those who are determined to be eligible through [facility]’s financial assistance program. Information regarding [facility]’s financial assistance program can be found by going to [email or web address] or by calling [phone].

## 2025-07-11 NOTE — PROGRESS NOTE ADULT - PROVIDER SPECIALTY LIST ADULT
Critical Care
Hospitalist
Infectious Disease
Palliative Care
Cardiology
Critical Care
Critical Care
Hospitalist
Infectious Disease
Pulmonology
Cardiology
Cardiology
Critical Care
Hospitalist
Pulmonology
Critical Care
Hospitalist
Hospitalist
Infectious Disease
Wound Care
Infectious Disease
Palliative Care

## 2025-07-11 NOTE — PROGRESS NOTE ADULT - TIME BILLING
Time above includes time spent preparing to see the patient, obtaining and/or reviewing separately obtained history, performing a medically appropriate examination and/or evaluation, counseling and educating the patient/family/caregiver, referring and communicating with other health care professionals (when not separately reported), documenting clinical information in the electronic or other health record, independently interpreting results (not separately reported) and communicating results to the patient/family/caregiver, and/or care coordination (not separately reported).
On this date of service, level of risk to patient is considered: Moderate.  I have personally seen and examined this patient.    I have reviewed all pertinent clinical information and reviewed all relevant imaging and diagnostic studies personally.   I discussed recommendations with the primary team. I discussed recommendations with the primary team.
Time above includes time spent preparing to see the patient, obtaining and/or reviewing separately obtained history, performing a medically appropriate examination and/or evaluation, counseling and educating the patient/family/caregiver, referring and communicating with other health care professionals (when not separately reported), documenting clinical information in the electronic or other health record, independently interpreting results (not separately reported) and communicating results to the patient/family/caregiver, and/or care coordination (not separately reported).

## 2025-07-19 DIAGNOSIS — A41.9 SEPSIS, UNSPECIFIED ORGANISM: ICD-10-CM

## 2025-07-19 DIAGNOSIS — T80.211A BLOODSTREAM INFECTION DUE TO CENTRAL VENOUS CATHETER, INITIAL ENCOUNTER: ICD-10-CM

## 2025-07-19 DIAGNOSIS — Z66 DO NOT RESUSCITATE: ICD-10-CM

## 2025-07-19 DIAGNOSIS — Z79.01 LONG TERM (CURRENT) USE OF ANTICOAGULANTS: ICD-10-CM

## 2025-07-19 DIAGNOSIS — B95.61 METHICILLIN SUSCEPTIBLE STAPHYLOCOCCUS AUREUS INFECTION AS THE CAUSE OF DISEASES CLASSIFIED ELSEWHERE: ICD-10-CM

## 2025-07-19 DIAGNOSIS — C25.9 MALIGNANT NEOPLASM OF PANCREAS, UNSPECIFIED: ICD-10-CM
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
## 2025-07-19 DIAGNOSIS — S30.810A ABRASION OF LOWER BACK AND PELVIS, INITIAL ENCOUNTER: ICD-10-CM

## 2025-07-19 DIAGNOSIS — R74.01 ELEVATION OF LEVELS OF LIVER TRANSAMINASE LEVELS: ICD-10-CM

## 2025-07-19 DIAGNOSIS — J96.01 ACUTE RESPIRATORY FAILURE WITH HYPOXIA: ICD-10-CM

## 2025-07-19 DIAGNOSIS — D84.81 IMMUNODEFICIENCY DUE TO CONDITIONS CLASSIFIED ELSEWHERE: ICD-10-CM

## 2025-07-19 DIAGNOSIS — I12.9 HYPERTENSIVE CHRONIC KIDNEY DISEASE WITH STAGE 1 THROUGH STAGE 4 CHRONIC KIDNEY DISEASE, OR UNSPECIFIED CHRONIC KIDNEY DISEASE: ICD-10-CM

## 2025-07-19 DIAGNOSIS — J18.9 PNEUMONIA, UNSPECIFIED ORGANISM: ICD-10-CM

## 2025-07-19 DIAGNOSIS — N18.30 CHRONIC KIDNEY DISEASE, STAGE 3 UNSPECIFIED: ICD-10-CM

## 2025-07-19 DIAGNOSIS — E83.42 HYPOMAGNESEMIA: ICD-10-CM

## 2025-07-19 DIAGNOSIS — I45.81 LONG QT SYNDROME: ICD-10-CM

## 2025-07-19 DIAGNOSIS — F03.90 UNSPECIFIED DEMENTIA, UNSPECIFIED SEVERITY, WITHOUT BEHAVIORAL DISTURBANCE, PSYCHOTIC DISTURBANCE, MOOD DISTURBANCE, AND ANXIETY: ICD-10-CM

## 2025-07-19 DIAGNOSIS — E43 UNSPECIFIED SEVERE PROTEIN-CALORIE MALNUTRITION: ICD-10-CM

## 2025-07-19 DIAGNOSIS — D63.1 ANEMIA IN CHRONIC KIDNEY DISEASE: ICD-10-CM

## 2025-07-19 DIAGNOSIS — G93.41 METABOLIC ENCEPHALOPATHY: ICD-10-CM

## 2025-07-19 DIAGNOSIS — R13.10 DYSPHAGIA, UNSPECIFIED: ICD-10-CM

## 2025-07-19 DIAGNOSIS — D69.6 THROMBOCYTOPENIA, UNSPECIFIED: ICD-10-CM

## 2025-07-19 DIAGNOSIS — R65.20 SEVERE SEPSIS WITHOUT SEPTIC SHOCK: ICD-10-CM

## 2025-07-19 DIAGNOSIS — Z86.718 PERSONAL HISTORY OF OTHER VENOUS THROMBOSIS AND EMBOLISM: ICD-10-CM

## 2025-07-19 DIAGNOSIS — Z51.5 ENCOUNTER FOR PALLIATIVE CARE: ICD-10-CM
